# Patient Record
Sex: FEMALE | Race: WHITE | NOT HISPANIC OR LATINO | Employment: FULL TIME | URBAN - METROPOLITAN AREA
[De-identification: names, ages, dates, MRNs, and addresses within clinical notes are randomized per-mention and may not be internally consistent; named-entity substitution may affect disease eponyms.]

---

## 2018-07-26 DIAGNOSIS — F32.A DEPRESSION, UNSPECIFIED DEPRESSION TYPE: Primary | ICD-10-CM

## 2018-07-26 RX ORDER — FLUOXETINE HYDROCHLORIDE 20 MG/1
CAPSULE ORAL EVERY 24 HOURS
COMMUNITY
Start: 2018-04-26 | End: 2018-07-26 | Stop reason: SDUPTHER

## 2018-07-26 RX ORDER — FLUOXETINE HYDROCHLORIDE 20 MG/1
20 CAPSULE ORAL EVERY 24 HOURS
Qty: 90 CAPSULE | Refills: 1 | Status: SHIPPED | OUTPATIENT
Start: 2018-07-26 | End: 2019-01-28 | Stop reason: SDUPTHER

## 2019-01-28 DIAGNOSIS — F32.A DEPRESSION, UNSPECIFIED DEPRESSION TYPE: ICD-10-CM

## 2019-01-28 DIAGNOSIS — I10 ESSENTIAL HYPERTENSION: Primary | ICD-10-CM

## 2019-01-28 RX ORDER — FLUOXETINE HYDROCHLORIDE 20 MG/1
20 CAPSULE ORAL EVERY 24 HOURS
Qty: 90 CAPSULE | Refills: 0 | Status: SHIPPED | OUTPATIENT
Start: 2019-01-28 | End: 2019-04-27 | Stop reason: SDUPTHER

## 2019-01-28 RX ORDER — LISINOPRIL AND HYDROCHLOROTHIAZIDE 25; 20 MG/1; MG/1
1 TABLET ORAL DAILY
Refills: 1 | COMMUNITY
Start: 2018-10-30 | End: 2019-01-28 | Stop reason: SDUPTHER

## 2019-01-28 RX ORDER — LISINOPRIL AND HYDROCHLOROTHIAZIDE 25; 20 MG/1; MG/1
1 TABLET ORAL DAILY
Qty: 90 TABLET | Refills: 0 | Status: SHIPPED | OUTPATIENT
Start: 2019-01-28 | End: 2019-04-27 | Stop reason: SDUPTHER

## 2019-01-28 NOTE — TELEPHONE ENCOUNTER
Fax from [facility] for refill of fluoxetine HCL 20mg cap  #90  Also Lisinopril/HCTZ 20/25mg qd  #90

## 2019-02-05 ENCOUNTER — OFFICE VISIT (OUTPATIENT)
Dept: FAMILY MEDICINE CLINIC | Facility: CLINIC | Age: 39
End: 2019-02-05
Payer: COMMERCIAL

## 2019-02-05 VITALS
BODY MASS INDEX: 48.82 KG/M2 | WEIGHT: 293 LBS | RESPIRATION RATE: 16 BRPM | SYSTOLIC BLOOD PRESSURE: 110 MMHG | DIASTOLIC BLOOD PRESSURE: 70 MMHG | OXYGEN SATURATION: 97 % | HEART RATE: 83 BPM | TEMPERATURE: 97.5 F | HEIGHT: 65 IN

## 2019-02-05 DIAGNOSIS — Z23 IMMUNIZATION DUE: Primary | ICD-10-CM

## 2019-02-05 DIAGNOSIS — I10 ESSENTIAL HYPERTENSION: ICD-10-CM

## 2019-02-05 DIAGNOSIS — J01.00 ACUTE NON-RECURRENT MAXILLARY SINUSITIS: ICD-10-CM

## 2019-02-05 DIAGNOSIS — F33.41 RECURRENT MAJOR DEPRESSIVE DISORDER, IN PARTIAL REMISSION (HCC): ICD-10-CM

## 2019-02-05 PROCEDURE — 3074F SYST BP LT 130 MM HG: CPT | Performed by: NURSE PRACTITIONER

## 2019-02-05 PROCEDURE — 3008F BODY MASS INDEX DOCD: CPT | Performed by: NURSE PRACTITIONER

## 2019-02-05 PROCEDURE — 3078F DIAST BP <80 MM HG: CPT | Performed by: NURSE PRACTITIONER

## 2019-02-05 PROCEDURE — 99214 OFFICE O/P EST MOD 30 MIN: CPT | Performed by: NURSE PRACTITIONER

## 2019-02-05 RX ORDER — FLUTICASONE PROPIONATE 50 MCG
SPRAY, SUSPENSION (ML) NASAL EVERY 12 HOURS
COMMUNITY
Start: 2018-01-09

## 2019-02-05 RX ORDER — AZITHROMYCIN 250 MG/1
TABLET, FILM COATED ORAL
Qty: 6 TABLET | Refills: 0 | Status: SHIPPED | OUTPATIENT
Start: 2019-02-05 | End: 2019-02-09

## 2019-02-05 NOTE — PROGRESS NOTES
Assessment/Plan:      Diagnoses and all orders for this visit:    Immunization due  Comments: Will hold due to illness  Orders:  -     Cancel: TDAP VACCINE GREATER THAN OR EQUAL TO 6YO IM  -     Cancel: PREFERRED: influenza vaccine, [id number], quadrivalent, recombinant, PF, 0 5 mL (FLUBLOK)    Essential hypertension  Comments:  Controlled with current meds  Reviewed low-salt diet weight reduction and increase in activity level  Will see back in 6 months    Recurrent major depressive disorder, in partial remission ([facility] [address] )  Comments:  Mood has improved and desires to continue with current meds at the current dose  Will renew medication    Acute non-recurrent maxillary sinusitis  Comments: Will treat sinusitis with a Z-Oliver and Flonase  Instructed to increase fluids may use Mucinex for additional help in moving mucous  Orders:  -     azithromycin (ZITHROMAX) 250 mg tablet; Take 2 tablets today then 1 tablet daily x 4 days    Other orders  -     fluticasone (FLONASE) 50 mcg/act nasal spray; Every 12 hours          Subjective:     Patient ID: Med Heaton is a 45 y o  female  HPI    Review of Systems   Constitutional: Negative  HENT: Positive for congestion, postnasal drip, rhinorrhea and sore throat  Negative for ear pain  Respiratory: Positive for cough  Cardiovascular: Negative  Gastrointestinal: Negative  Allergic/Immunologic: Negative  Neurological: Negative  Hematological: Negative  Psychiatric/Behavioral: The patient is nervous/anxious  Objective:     Physical Exam   Constitutional: She is oriented to person, place, and time  She appears well-developed and well-nourished  HENT:   Head: Normocephalic  Right Ear: Tympanic membrane is bulging  Left Ear: Tympanic membrane is bulging  Nose: Mucosal edema present  Right sinus exhibits maxillary sinus tenderness  Left sinus exhibits maxillary sinus tenderness     Mouth/Throat: Oropharynx is clear and moist    Eyes: Conjunctivae and EOM are normal    Neck: Normal range of motion  Neck supple  Cardiovascular: Normal rate, regular rhythm and normal heart sounds  Pulmonary/Chest: Effort normal and breath sounds normal    Abdominal: Soft  Bowel sounds are normal    Musculoskeletal: Normal range of motion  Neurological: She is alert and oriented to person, place, and time  Skin: Skin is warm and dry  Psychiatric: She has a normal mood and affect   Her behavior is normal

## 2019-04-27 DIAGNOSIS — I10 ESSENTIAL HYPERTENSION: ICD-10-CM

## 2019-04-27 DIAGNOSIS — F32.A DEPRESSION, UNSPECIFIED DEPRESSION TYPE: ICD-10-CM

## 2019-04-29 RX ORDER — LISINOPRIL AND HYDROCHLOROTHIAZIDE 25; 20 MG/1; MG/1
TABLET ORAL
Qty: 90 TABLET | Refills: 0 | Status: SHIPPED | OUTPATIENT
Start: 2019-04-29 | End: 2019-07-31 | Stop reason: SDUPTHER

## 2019-04-29 RX ORDER — FLUOXETINE HYDROCHLORIDE 20 MG/1
20 CAPSULE ORAL EVERY 24 HOURS
Qty: 90 CAPSULE | Refills: 0 | Status: SHIPPED | OUTPATIENT
Start: 2019-04-29 | End: 2019-07-31 | Stop reason: SDUPTHER

## 2019-06-11 ENCOUNTER — OFFICE VISIT (OUTPATIENT)
Dept: FAMILY MEDICINE CLINIC | Facility: CLINIC | Age: 39
End: 2019-06-11
Payer: COMMERCIAL

## 2019-06-11 VITALS
DIASTOLIC BLOOD PRESSURE: 72 MMHG | HEART RATE: 79 BPM | WEIGHT: 293 LBS | TEMPERATURE: 97.9 F | SYSTOLIC BLOOD PRESSURE: 124 MMHG | HEIGHT: 65 IN | OXYGEN SATURATION: 98 % | RESPIRATION RATE: 16 BRPM | BODY MASS INDEX: 48.82 KG/M2

## 2019-06-11 DIAGNOSIS — M54.50 ACUTE LEFT-SIDED LOW BACK PAIN WITHOUT SCIATICA: ICD-10-CM

## 2019-06-11 DIAGNOSIS — M54.9 MID BACK PAIN: Primary | ICD-10-CM

## 2019-06-11 PROBLEM — J01.00 ACUTE NON-RECURRENT MAXILLARY SINUSITIS: Status: RESOLVED | Noted: 2019-02-05 | Resolved: 2019-06-11

## 2019-06-11 PROCEDURE — 99051 MED SERV EVE/WKEND/HOLIDAY: CPT | Performed by: PHYSICIAN ASSISTANT

## 2019-06-11 PROCEDURE — 3008F BODY MASS INDEX DOCD: CPT | Performed by: PHYSICIAN ASSISTANT

## 2019-06-11 PROCEDURE — 99214 OFFICE O/P EST MOD 30 MIN: CPT | Performed by: PHYSICIAN ASSISTANT

## 2019-06-11 RX ORDER — CYCLOBENZAPRINE HCL 10 MG
10 TABLET ORAL 3 TIMES DAILY PRN
Qty: 30 TABLET | Refills: 1 | Status: SHIPPED | OUTPATIENT
Start: 2019-06-11 | End: 2020-09-18

## 2019-06-27 ENCOUNTER — EVALUATION (OUTPATIENT)
Dept: PHYSICAL THERAPY | Facility: REHABILITATION | Age: 39
End: 2019-06-27
Payer: COMMERCIAL

## 2019-06-27 DIAGNOSIS — S39.012D STRAIN OF LUMBAR PARASPINOUS MUSCLE, SUBSEQUENT ENCOUNTER: Primary | ICD-10-CM

## 2019-06-27 DIAGNOSIS — M54.50 ACUTE LEFT-SIDED LOW BACK PAIN WITHOUT SCIATICA: ICD-10-CM

## 2019-06-27 DIAGNOSIS — M54.9 MID BACK PAIN: ICD-10-CM

## 2019-06-27 PROCEDURE — 97161 PT EVAL LOW COMPLEX 20 MIN: CPT

## 2019-06-27 PROCEDURE — G0283 ELEC STIM OTHER THAN WOUND: HCPCS

## 2019-06-27 PROCEDURE — 97014 ELECTRIC STIMULATION THERAPY: CPT

## 2019-07-31 DIAGNOSIS — I10 ESSENTIAL HYPERTENSION: ICD-10-CM

## 2019-07-31 DIAGNOSIS — F32.A DEPRESSION, UNSPECIFIED DEPRESSION TYPE: ICD-10-CM

## 2019-07-31 RX ORDER — LISINOPRIL AND HYDROCHLOROTHIAZIDE 25; 20 MG/1; MG/1
TABLET ORAL
Qty: 90 TABLET | Refills: 0 | Status: SHIPPED | OUTPATIENT
Start: 2019-07-31 | End: 2019-11-21 | Stop reason: SDUPTHER

## 2019-07-31 RX ORDER — FLUOXETINE HYDROCHLORIDE 20 MG/1
20 CAPSULE ORAL EVERY 24 HOURS
Qty: 90 CAPSULE | Refills: 0 | Status: SHIPPED | OUTPATIENT
Start: 2019-07-31 | End: 2019-11-05 | Stop reason: SDUPTHER

## 2019-08-09 ENCOUNTER — OFFICE VISIT (OUTPATIENT)
Dept: FAMILY MEDICINE CLINIC | Facility: CLINIC | Age: 39
End: 2019-08-09
Payer: COMMERCIAL

## 2019-08-09 VITALS
OXYGEN SATURATION: 98 % | TEMPERATURE: 97.6 F | DIASTOLIC BLOOD PRESSURE: 74 MMHG | BODY MASS INDEX: 48.82 KG/M2 | HEIGHT: 65 IN | SYSTOLIC BLOOD PRESSURE: 122 MMHG | RESPIRATION RATE: 16 BRPM | WEIGHT: 293 LBS | HEART RATE: 85 BPM

## 2019-08-09 DIAGNOSIS — Z13.220 SCREENING FOR HYPERLIPIDEMIA: ICD-10-CM

## 2019-08-09 DIAGNOSIS — K21.9 GASTROESOPHAGEAL REFLUX DISEASE WITHOUT ESOPHAGITIS: ICD-10-CM

## 2019-08-09 DIAGNOSIS — I10 ESSENTIAL HYPERTENSION: Primary | ICD-10-CM

## 2019-08-09 DIAGNOSIS — N92.0 MENORRHAGIA WITH REGULAR CYCLE: ICD-10-CM

## 2019-08-09 PROCEDURE — 99214 OFFICE O/P EST MOD 30 MIN: CPT | Performed by: NURSE PRACTITIONER

## 2019-08-09 PROCEDURE — 3008F BODY MASS INDEX DOCD: CPT | Performed by: NURSE PRACTITIONER

## 2019-08-09 PROCEDURE — 3074F SYST BP LT 130 MM HG: CPT | Performed by: NURSE PRACTITIONER

## 2019-08-09 RX ORDER — OMEPRAZOLE 40 MG/1
40 CAPSULE, DELAYED RELEASE ORAL
Qty: 90 CAPSULE | Refills: 3 | Status: SHIPPED | OUTPATIENT
Start: 2019-08-09 | End: 2020-09-18

## 2019-08-09 NOTE — ASSESSMENT & PLAN NOTE
Will controlled with current medication  Will continue with same discussed weight loss, increased exercise, and low-salt diet  Will follow up in 6 months    Labs ordered

## 2019-08-09 NOTE — PATIENT INSTRUCTIONS
Obesity   AMBULATORY CARE:   Obesity  is when your body mass index (BMI) is greater than 30  Your healthcare provider will use your height and weight to measure your BMI  The risks of obesity include  many health problems, such as injuries or physical disability  You may need tests to check for the following:  · Diabetes     · High blood pressure or high cholesterol     · Heart disease     · Gallbladder or liver disease     · Cancer of the colon, breast, prostate, liver, or kidney     · Sleep apnea     · Arthritis or gout  Seek care immediately if:   · You have a severe headache, confusion, or difficulty speaking  · You have weakness on one side of your body  · You have chest pain, sweating, or shortness of breath  Contact your healthcare provider if:   · You have symptoms of gallbladder or liver disease, such as pain in your upper abdomen  · You have knee or hip pain and discomfort while walking  · You have symptoms of diabetes, such as intense hunger and thirst, and frequent urination  · You have symptoms of sleep apnea, such as snoring or daytime sleepiness  · You have questions or concerns about your condition or care  Treatment for obesity  focuses on helping you lose weight to improve your health  Even a small decrease in BMI can reduce the risk for many health problems  Your healthcare provider will help you set a weight-loss goal   · Lifestyle changes  are the first step in treating obesity  These include making healthy food choices and getting regular physical activity  Your healthcare provider may suggest a weight-loss program that involves coaching, education, and therapy  · Medicine  may help you lose weight when it is used with a healthy diet and physical activity  · Surgery  can help you lose weight if you are very obese and have other health problems  There are several types of weight-loss surgery  Ask your healthcare provider for more information    Be successful losing weight:   · Set small, realistic goals  An example of a small goal is to walk for 20 minutes 5 days a week  Anther goal is to lose 5% of your body weight  · Tell friends, family members, and coworkers about your goals  and ask for their support  Ask a friend to lose weight with you, or join a weight-loss support group  · Identify foods or triggers that may cause you to overeat , and find ways to avoid them  Remove tempting high-calorie foods from your home and workplace  Place a bowl of fresh fruit on your kitchen counter  If stress causes you to eat, then find other ways to cope with stress  · Keep a diary to track what you eat and drink  Also write down how many minutes of physical activity you do each day  Weigh yourself once a week and record it in your diary  Eating changes: You will need to eat 500 to 1,000 fewer calories each day than you currently eat to lose 1 to 2 pounds a week  The following changes will help you cut calories:  · Eat smaller portions  Use small plates, no larger than 9 inches in diameter  Fill your plate half full of fruits and vegetables  Measure your food using measuring cups until you know what a serving size looks like  · Eat 3 meals and 1 or 2 snacks each day  Plan your meals in advance  [place] and eat at home most of the time  Eat slowly  · Eat fruits and vegetables at every meal   They are low in calories and high in fiber, which makes you feel full  Do not add butter, margarine, or cream sauce to vegetables  Use herbs to season steamed vegetables  · Eat less fat and fewer fried foods  Eat more baked or grilled chicken and fish  These protein sources are lower in calories and fat than red meat  Limit fast food  Dress your salads with olive oil and vinegar instead of bottled dressing  · Limit the amount of sugar you eat  Do not drink sugary beverages  Limit alcohol  Activity changes:  Physical activity is good for your body in many ways   It helps you burn calories and build strong muscles  It decreases stress and depression, and improves your mood  It can also help you sleep better  Talk to your healthcare provider before you begin an exercise program   · Exercise for at least 30 minutes 5 days a week  Start slowly  Set aside time each day for physical activity that you enjoy and that is convenient for you  It is best to do both weight training and an activity that increases your heart rate, such as walking, bicycling, or swimming  · Find ways to be more active  Do yard work and housecleaning  Walk up the stairs instead of using elevators  Spend your leisure time going to events that require walking, such as outdoor festivals or fairs  This extra physical activity can help you lose weight and keep it off  Follow up with your healthcare provider as directed: You may need to meet with a dietitian  Write down your questions so you remember to ask them during your visits  © 2017 [employer] Information is for End User's use only and may not be sold, redistributed or otherwise used for commercial purposes  All illustrations and images included in CareNotes® are the copyrighted property of [employer]  or [employer]  The above information is an  only  It is not intended as medical advice for individual conditions or treatments  Talk to your doctor, nurse or pharmacist before following any medical regimen to see if it is safe and effective for you  Low Fat Diet   AMBULATORY CARE:   A low-fat diet  is an eating plan that is low in total fat, unhealthy fat, and cholesterol  You may need to follow a low-fat diet if you have trouble digesting or absorbing fat  You may also need to follow this diet if you have high cholesterol  You can also lower your cholesterol by increasing the amount of fiber in your diet  Soluble fiber is a type of fiber that helps to decrease cholesterol levels     Different types of fat in food:   · Limit unhealthy fats  A diet that is high in cholesterol, saturated fat, and trans fat may cause unhealthy cholesterol levels  Unhealthy cholesterol levels increase your risk of heart disease  ¨ Cholesterol:  Limit intake of cholesterol to less than 200 mg per day  Cholesterol is found in meat, eggs, and dairy  ¨ Saturated fat:  Limit saturated fat to less than 7% of your total daily calories  Ask your dietitian how many calories you need each day  Saturated fat is found in butter, cheese, ice cream, whole milk, and palm oil  Saturated fat is also found in meat, such as beef, pork, chicken skin, and processed meats  Processed meats include sausage, hot dogs, and bologna  ¨ Trans fat:  Avoid trans fat as much as possible  Trans fat is used in fried and baked foods  Foods that say trans fat free on the label may still have up to 0 5 grams of trans fat per serving  · Include healthy fats  Replace foods that are high in saturated and trans fat with foods high in healthy fats  This may help to decrease high cholesterol levels  ¨ Monounsaturated fats: These are found in avocados, nuts, and vegetable oils, such as olive, canola, and sunflower oil  ¨ Polyunsaturated fats: These can be found in vegetable oils, such as soybean or corn oil  Omega-3 fats can help to decrease the risk of heart disease  Omega-3 fats are found in fish, such as salmon, herring, trout, and tuna  Omega-3 fats can also be found in plant foods, such as walnuts, flaxseed, soybeans, and canola oil    Foods to limit or avoid:   · Grains:      ¨ Snacks that are made with partially hydrogenated oils, such as chips, regular crackers, and butter-flavored popcorn    ¨ High-fat baked goods, such as biscuits, croissants, doughnuts, pies, cookies, and pastries    · Dairy:      ¨ Whole milk, 2% milk, and yogurt and ice cream made with whole milk    ¨ Half and half creamer, heavy cream, and whipping cream    ¨ Cheese, cream cheese, and sour cream    · Meats and proteins:      ¨ High-fat cuts of meat (T-bone steak, regular hamburger, and ribs)    ¨ Fried meat, poultry (turkey and chicken), and fish    ¨ Poultry (chicken and turkey) with skin    ¨ Cold cuts (salami or bologna), hot dogs, razo, and sausage    ¨ Whole eggs and egg yolks    · Vegetables and fruits with added fat:      ¨ Fried vegetables or vegetables in butter or high-fat sauces, such as cream or cheese sauces    ¨ Fried fruit or fruit served with butter or cream    · Fats:      ¨ Butter, stick margarine, and shortening    ¨ Coconut, palm oil, and palm kernel oil  Foods to include:   · Grains:      ¨ Whole-grain breads, cereals, pasta, and brown rice    ¨ Low-fat crackers and pretzels    · Vegetables and fruits:      ¨ Fresh, frozen, or canned vegetables (no salt or low-sodium)    ¨ Fresh, frozen, dried, or canned fruit (canned in light syrup or fruit juice)    ¨ Avocado    · Low-fat dairy products:      ¨ Nonfat (skim) or 1% milk    ¨ Nonfat or low-fat cheese, yogurt, and cottage cheese    · Meats and proteins:      ¨ Chicken or turkey with no skin    ¨ Baked or broiled fish    ¨ Lean beef and pork (loin, round, extra lean hamburger)    ¨ Beans and peas, unsalted nuts, soy products    ¨ Egg whites and substitutes    ¨ Seeds and nuts    · Fats:      ¨ Unsaturated oil, such as canola, olive, peanut, soybean, or sunflower oil    ¨ Soft or liquid margarine and vegetable oil spread    ¨ Low-fat salad dressing  Other ways to decrease fat:   · Read food labels before you buy foods  Choose foods that have less than 30% of calories from fat  Choose low-fat or fat-free dairy products  Remember that fat free does not mean calorie free  These foods still contain calories, and too many calories can lead to weight gain  · Trim fat from meat and avoid fried food  Trim all visible fat from meat before you cook it  Remove the skin from poultry  Do not roper meat, fish, or poultry  Bake, roast, boil, or broil these foods instead  Avoid fried foods  Eat a baked potato instead of [place] fries  Steam vegetables instead of sautéing them in butter  · Add less fat to foods  Use imitation razo bits on salads and baked potatoes instead of regular razo bits  Use fat-free or low-fat salad dressings instead of regular dressings  Use low-fat or nonfat butter-flavored topping instead of regular butter or margarine on popcorn and other foods  Ways to decrease fat in recipes:  Replace high-fat ingredients with low-fat or nonfat ones  This may cause baked goods to be drier than usual  You may need to use nonfat cooking spray on pans to prevent food from sticking  You also may need to change the amount of other ingredients, such as water, in the recipe  Try the following:  · Use low-fat or light margarine instead of regular margarine or shortening  · Use lean ground turkey breast or chicken, or lean ground beef (less than 5% fat) instead of hamburger  · Add 1 teaspoon of canola oil to 8 ounces of skim milk instead of using cream or half and half  · Use grated zucchini, carrots, or apples in breads instead of coconut  · Use blenderized, low-fat cottage cheese, plain tofu, or low-fat ricotta cheese instead of cream cheese  · Use 1 egg white and 1 teaspoon of canola oil, or use ¼ cup (2 ounces) of fat-free egg substitute instead of a whole egg  · Replace half of the oil that is called for in a recipe with applesauce when you bake  Use 3 tablespoons of cocoa powder and 1 tablespoon of canola oil instead of a square of baking chocolate  How to increase fiber:  Eat enough high-fiber foods to get 20 to 30 grams of fiber every day  Slowly increase your fiber intake to avoid stomach cramps, gas, and other problems  · Eat 3 ounces of whole-grain foods each day  An ounce is about 1 slice of bread  Eat whole-grain breads, such as whole-wheat bread   Whole wheat, whole-wheat flour, or other whole grains should be listed as the first ingredient on the food label  Replace white flour with whole-grain flour or use half of each in recipes  Whole-grain flour is heavier than white flour, so you may have to add more yeast or baking powder  · Eat a high-fiber cereal for breakfast   Oatmeal is a good source of soluble fiber  Look for cereals that have bran or fiber in the name  Choose whole-grain products, such as brown rice, barley, and whole-wheat pasta  · Eat more beans, peas, and lentils  For example, add beans to soups or salads  Eat at least 5 cups of fruits and vegetables each day  Eat fruits and vegetables with the peel because the peel is high in fiber  © 2017 [employer] Information is for End User's use only and may not be sold, redistributed or otherwise used for commercial purposes  All illustrations and images included in CareNotes® are the copyrighted property of A D [employer] M [employer]  or [employer]inder [employer]  The above information is an  only  It is not intended as medical advice for individual conditions or treatments  Talk to your doctor, nurse or pharmacist before following any medical regimen to see if it is safe and effective for you  Heart Healthy Diet   AMBULATORY CARE:   A heart healthy diet  is an eating plan low in total fat, unhealthy fats, and sodium (salt)  A heart healthy diet helps decrease your risk for heart disease and stroke  Limit the amount of fat you eat to 25% to 35% of your total daily calories  Limit sodium to less than 2,300 mg each day  Healthy fats:  Healthy fats can help improve cholesterol levels  The risk for heart disease is decreased when cholesterol levels are normal  Choose healthy fats, such as the following:  · Unsaturated fat  is found in foods such as soybean, canola, olive, corn, and safflower oils  It is also found in soft tub margarine that is made with liquid vegetable oil       · Omega-3 fat  is found in certain fish, such as salmon, tuna, and trout, and in walnuts and flaxseed  Unhealthy fats:  Unhealthy fats can cause unhealthy cholesterol levels in your blood and increase your risk of heart disease  Limit unhealthy fats, such as the following:  · Cholesterol  is found in animal foods, such as eggs and lobster, and in dairy products made from whole milk  Limit cholesterol to less than 300 milligrams (mg) each day  You may need to limit cholesterol to 200 mg each day if you have heart disease  · Saturated fat  is found in meats, such as razo and hamburger  It is also found in chicken or turkey skin, whole milk, and butter  Limit saturated fat to less than 7% of your total daily calories  Limit saturated fat to less than 6% if you have heart disease or are at increased risk for it  · Trans fat  is found in packaged foods, such as potato chips and cookies  It is also in hard margarine, some fried foods, and shortening  Avoid trans fats as much as possible    Heart healthy foods and drinks to include:  Ask your dietitian or healthcare provider how many servings to have from each of the following food groups:  · Grains:      ¨ Whole-wheat breads, cereals, and pastas, and brown rice    ¨ Low-fat, low-sodium crackers and chips    · Vegetables:      ¨ Broccoli, green beans, green peas, and spinach    ¨ Collards, kale, and lima beans    ¨ Carrots, sweet potatoes, tomatoes, and peppers    ¨ Canned vegetables with no salt added    · Fruits:      ¨ Bananas, peaches, pears, and pineapple    ¨ Grapes, raisins, and dates    ¨ Oranges, tangerines, grapefruit, orange juice, and grapefruit juice    ¨ Apricots, mangoes, melons, and papaya    ¨ Raspberries and strawberries    ¨ Canned fruit with no added sugar    · Low-fat dairy products:      ¨ Nonfat (skim) milk, 1% milk, and low-fat almond, cashew, or soy milks fortified with calcium    ¨ Low-fat cheese, regular or frozen yogurt, and cottage cheese    · Meats and proteins , such as lean cuts of beef and pork (loin, leg, round), skinless chicken and turkey, legumes, soy products, egg whites, and nuts  Foods and drinks to limit or avoid:  Ask your dietitian or healthcare provider about these and other foods that are high in unhealthy fat, sodium, and sugar:  · Snack or packaged foods , such as frozen dinners, cookies, macaroni and cheese, and cereals with more than 300 mg of sodium per serving    · Canned or dry mixes  for cakes, soups, sauces, or gravies    · Vegetables with added sodium , such as instant potatoes, vegetables with added sauces, or regular canned vegetables    · Other foods high in sodium , such as ketchup, barbecue sauce, salad dressing, pickles, olives, soy sauce, and miso    · High-fat dairy foods  such as whole or 2% milk, cream cheese, or sour cream, and cheeses     · High-fat protein foods  such as high-fat cuts of beef (T-bone steaks, ribs), chicken or turkey with skin, and organ meats, such as liver    · Cured or smoked meats , such as hot dogs, razo, and sausage    · Unhealthy fats and oils , such as butter, stick margarine, shortening, and cooking oils such as coconut or palm oil    · Food and drinks high in sugar , such as soft drinks (soda), sports drinks, sweetened tea, candy, cake, cookies, pies, and doughnuts  Other diet guidelines to follow:   · Eat more foods containing omega-3 fats  Eat fish high in omega-3 fats at least 2 times a week  · Limit alcohol  Too much alcohol can damage your heart and raise your blood pressure  Women should limit alcohol to 1 drink a day  Men should limit alcohol to 2 drinks a day  A drink of alcohol is 12 ounces of beer, 5 ounces of wine, or 1½ ounces of liquor  · Choose low-sodium foods  High-sodium foods can lead to high blood pressure  Add little or no salt to food you prepare  Use herbs and spices in place of salt  · Eat more fiber  to help lower cholesterol levels   Eat at least 5 servings of fruits and vegetables each day  Eat 3 ounces of whole-grain foods each day  Legumes (beans) are also a good source of fiber  Lifestyle guidelines:   · Do not smoke  Nicotine and other chemicals in cigarettes and cigars can cause lung and heart damage  Ask your healthcare provider for information if you currently smoke and need help to quit  E-cigarettes or smokeless tobacco still contain nicotine  Talk to your healthcare provider before you use these products  · Exercise regularly  to help you maintain a healthy weight and improve your blood pressure and cholesterol levels  Ask your healthcare provider about the best exercise plan for you  Do not start an exercise program without asking your healthcare provider  Follow up with your healthcare provider as directed:  Write down your questions so you remember to ask them during your visits  © 2017 [address] [facility] Information is for End User's use only and may not be sold, redistributed or otherwise used for commercial purposes  All illustrations and images included in CareNotes® are the copyrighted property of A D A M [employer]  or Gurvinder Osorio  The above information is an  only  It is not intended as medical advice for individual conditions or treatments  Talk to your doctor, nurse or pharmacist before following any medical regimen to see if it is safe and effective for you  Calorie Counting Diet   WHAT YOU NEED TO KNOW:   What is a calorie counting diet? It is a meal plan based on counting calories each day to reach a healthy body weight  You will need to eat fewer calories if you are trying to lose weight  Weight loss may decrease your risk for certain health problems or improve your health if you have health problems  Some of these health problems include heart disease, high blood pressure, and diabetes  What foods should I avoid?   Your dietitian will tell you if you need to avoid certain foods based on your body weight and health condition  You may need to avoid high-fat foods if you are at risk for or have heart disease  You may need to eat fewer foods from the breads and starches food group if you have diabetes  How many calories are in foods? The following is a list of foods and drinks with the approximate number of calories in each  Check the food label to find the exact number of calories  A dietitian can tell you how many calories you should have from each food group each day    · Carbohydrate:      ¨ ½ of a 3-inch bagel, 1 slice of bread, or ½ of a hamburger bun or hot dog bun (80)    ¨ 1 (8-inch) flour tortilla or ½ cup of cooked rice (100)    ¨ 1 (6-inch) corn tortilla (80)    ¨ 1 (6-inch) pancake or 1 cup of bran flakes cereal (110)    ¨ ½ cup of cooked cereal (80)    ¨ ½ cup of cooked pasta (85)    ¨ 1 ounce of pretzels (100)    ¨ 3 cups of air-popped popcorn without butter or oil (80)    · Dairy:      ¨ 1 cup of skim or 1% milk (90)    ¨ 1 cup of 2% milk (120)    ¨ 1 cup of whole milk (160)    ¨ 1 cup of 2% chocolate milk (220)    ¨ 1 ounce of low-fat cheese with 3 grams of fat per ounce (70)    ¨ 1 ounce of cheddar cheese (114)    ¨ ½ cup of 1% fat cottage cheese (80)    ¨ 1 cup of plain or sugar-free, fat-free yogurt (90)    · Protein foods:      ¨ 3 ounces of fish (not breaded or fried) (95)    ¨ 3 ounces of breaded, fried fish (195)    ¨ ¾ cup of tuna canned in water (105)    ¨ 3 ounces of chicken breast without skin (105)    ¨ 1 fried chicken breast with skin (350)    ¨ ¼ cup of fat free egg substitute (40)    ¨ 1 large egg (75)    ¨ 3 ounces of lean beef or pork (165)    ¨ 3 ounces of fried pork chop or ham (185)    ¨ ½ cup of cooked dried beans, such as kidney, nieto, lentils, or navy (115)    ¨ 3 ounces of bologna or lunch meat (225)    ¨ 2 links of breakfast sausage (140)    · Vegetables:      ¨ ½ cup of sliced mushrooms (10)    ¨ 1 cup of salad greens, such as lettuce, spinach, or noah (15)    ¨ ½ cup of steamed asparagus (20)    ¨ ½ cup of cooked summer squash, zucchini squash, or green or wax beans (25)    ¨ 1 cup of broccoli or cauliflower florets, or 1 medium tomato (25)    ¨ 1 large raw carrot or ½ cup of cooked carrots (40)    ¨ ? of a medium cucumber or 1 stalk of celery (5)    ¨ 1 small baked potato (160)    ¨ 1 cup of breaded, fried vegetables (230)    · Fruit:      ¨ 1 (6-inch) banana (55)     ¨ ½ of a 4-inch grapefruit (55)    ¨ 15 grapes (60)    ¨ 1 medium orange or apple (70)    ¨ 1 large peach (65)    ¨ 1 cup of fresh pineapple chunks (75)    ¨ 1 cup of melon cubes (50)    ¨ 1¼ cups of whole strawberries (45)    ¨ ½ cup of fruit canned in juice (55)    ¨ ½ cup of fruit canned in heavy syrup (110)    ¨ ?  cup of raisins (130)    ¨ ½ cup of unsweetened fruit juice (60)    ¨ ½ cup of grape, cranberry, or prune juice (90)    · Fat:      ¨ 10 peanuts or 2 teaspoons of peanut butter (55)    ¨ 2 tablespoons of avocado or 1 tablespoon of regular salad dressing (45)    ¨ 2 slices of razo (90)    ¨ 1 teaspoon of oil, such as safflower, canola, corn, or olive oil (45)    ¨ 2 teaspoons of low-fat margarine, or 1 tablespoon of low-fat mayonnaise (50)    ¨ 1 teaspoon of regular margarine (40)    ¨ 1 tablespoon of regular mayonnaise (135)    ¨ 1 tablespoon of cream cheese or 2 tablespoons of low-fat cream cheese (45)    ¨ 2 tablespoons of vegetable shortening (215)    · Dessert and sweets:      ¨ 8 animal crackers or 5 vanilla wafers (80)    ¨ 1 frozen fruit juice bar (80)    ¨ ½ cup of ice milk or low-fat frozen yogurt (90)    ¨ ½ cup of sherbet or sorbet (125)    ¨ ½ cup of sugar-free pudding or custard (60)    ¨ ½ cup of ice cream (140)    ¨ ½ cup of pudding or custard (175)    ¨ 1 (2-inch) square chocolate brownie (185)    · Combination foods:      ¨ Bean burrito made with an 8-inch tortilla, without cheese (275)    ¨ Chicken breast sandwich with lettuce and tomato (325)    ¨ 1 cup of chicken noodle soup (60)    ¨ 1 beef taco (175)    ¨ Regular hamburger with lettuce and tomato (310)    ¨ Regular cheeseburger with lettuce and tomato (410)     ¨ ¼ of a 12-inch cheese pizza (280)    ¨ Fried fish sandwich with lettuce and tomato (425)    ¨ Hot dog and bun (275)    ¨ 1½ cups of macaroni and cheese (310)    ¨ Taco salad with a fried tortilla shell (870)    · Low-calorie foods:      ¨ 1 tablespoon of ketchup or 1 tablespoon of fat free sour cream (15)    ¨ 1 teaspoon of mustard (5)    ¨ ¼ cup of salsa (20)    ¨ 1 large dill pickle (15)    ¨ 1 tablespoon of fat free salad dressing (10)    ¨ 2 teaspoons of low-sugar, light jam or jelly, or 1 tablespoon of sugar-free syrup (15)    ¨ 1 sugar-free popsicle (15)    ¨ 1 cup of club soda, seltzer water, or diet soda (0)  CARE AGREEMENT:   You have the right to help plan your care  Discuss treatment options with your caregivers to decide what care you want to receive  You always have the right to refuse treatment  The above information is an  only  It is not intended as medical advice for individual conditions or treatments  Talk to your doctor, nurse or pharmacist before following any medical regimen to see if it is safe and effective for you  © 2017 [employer] Information is for End User's use only and may not be sold, redistributed or otherwise used for commercial purposes  All illustrations and images included in CareNotes® are the copyrighted property of [employer] M [employer]  or [employer]

## 2019-08-09 NOTE — ASSESSMENT & PLAN NOTE
Blood pressure is well controlled with current medication  Discussed diet, exercise, and continuing with low-salt diet    She is to follow up in 6 months

## 2019-08-09 NOTE — PROGRESS NOTES
Assessment/Plan:     Gastroesophageal reflux disease without esophagitis  No abdominal pain on exam   Will use omeprazole 40 mg p r n  But may increase to daily if not controlled  Menorrhagia with regular cycle  Will refer to gyn for eval and treat  Screening for hyperlipidemia  Will do screening lipid panel and treat as needed  Essential hypertension  Blood pressure is well controlled with current medication  Discussed diet, exercise, and continuing with low-salt diet  She is to follow up in 6 months       Diagnoses and all orders for this visit:    Essential hypertension  -     Comprehensive metabolic panel; Future    Gastroesophageal reflux disease without esophagitis  -     omeprazole (PriLOSEC) 40 MG capsule; Take 1 capsule (40 mg total) by mouth daily before breakfast    Screening for hyperlipidemia  -     Lipid Panel with Direct LDL reflex; Future    Menorrhagia with regular cycle  -     CBC and differential; Future  -     Ambulatory referral to Obstetrics / Gynecology; Future          Subjective:     Patient ID: Vero Rao is a 44 y o  female  HPI  Presents today for 6 month f/u  Her depression is good, having some bad days but generally good  BP is controlled  The abd decubutis is healed  Lately she has been having heart burn  It is everyday and not related to food  Review of Systems   Constitutional: Negative  Respiratory: Negative  Cardiovascular: Negative  Gastrointestinal:        Heart burn in the chest and goes up the throat  Genitourinary: Positive for menstrual problem (periods are very heavy  She uses b/w 12-15 pads a day  )  Musculoskeletal: Positive for back pain (is causes with heavy lifting )  Allergic/Immunologic: Positive for environmental allergies  Neurological: Negative  Hematological: Negative  Psychiatric/Behavioral: Positive for decreased concentration  The patient is nervous/anxious            Objective:     Physical Exam Constitutional: She is oriented to person, place, and time  She appears well-developed and well-nourished  HENT:   Head: Normocephalic and atraumatic  Right Ear: External ear normal    Left Ear: External ear normal    Nose: Nose normal    Mouth/Throat: Oropharynx is clear and moist    Eyes: Conjunctivae and EOM are normal    Neck: Normal range of motion  Neck supple  Cardiovascular: Normal rate, regular rhythm, normal heart sounds and intact distal pulses  Pulmonary/Chest: Effort normal and breath sounds normal    Abdominal: Soft  Bowel sounds are normal    Musculoskeletal: Normal range of motion  Neurological: She is alert and oriented to person, place, and time  She has normal reflexes  Skin: Skin is warm and dry  Psychiatric: She has a normal mood and affect  Her behavior is normal  Judgment and thought content normal      BMI Counseling: Body mass index is 57 24 kg/m²  Discussed the patient's BMI with her  The BMI is above average  BMI counseling and education was provided to the patient  Nutrition recommendations include reducing portion sizes, decreasing overall calorie intake, 3-5 servings of fruits/vegetables daily, reducing fast food intake, consuming healthier snacks, decreasing soda and/or juice intake, moderation in carbohydrate intake, increasing intake of lean protein, reducing intake of saturated fat and trans fat and reducing intake of cholesterol  Exercise recommendations include moderate aerobic physical activity for 150 minutes/week, vigorous aerobic physical activity for 75 minutes/week and exercising 3-5 times per week  Pharmacotherapy was recommended as ordered

## 2019-08-09 NOTE — ASSESSMENT & PLAN NOTE
No abdominal pain on exam   Will use omeprazole 40 mg p r n  But may increase to daily if not controlled

## 2019-11-05 DIAGNOSIS — F32.A DEPRESSION, UNSPECIFIED DEPRESSION TYPE: ICD-10-CM

## 2019-11-06 RX ORDER — FLUOXETINE HYDROCHLORIDE 20 MG/1
20 CAPSULE ORAL EVERY 24 HOURS
Qty: 90 CAPSULE | Refills: 0 | Status: SHIPPED | OUTPATIENT
Start: 2019-11-06 | End: 2020-02-03 | Stop reason: SDUPTHER

## 2019-11-09 DIAGNOSIS — I10 ESSENTIAL HYPERTENSION: ICD-10-CM

## 2019-11-09 RX ORDER — LISINOPRIL AND HYDROCHLOROTHIAZIDE 25; 20 MG/1; MG/1
1 TABLET ORAL DAILY
Qty: 90 TABLET | Refills: 0 | Status: CANCELLED | OUTPATIENT
Start: 2019-11-09

## 2019-11-21 DIAGNOSIS — I10 ESSENTIAL HYPERTENSION: ICD-10-CM

## 2019-11-21 NOTE — TELEPHONE ENCOUNTER
Pt left message stating that this is the third request for refill of lisinopril/HCTZ 20/25mg    [facility] at [phone]    Pt was upset

## 2019-11-25 RX ORDER — LISINOPRIL AND HYDROCHLOROTHIAZIDE 25; 20 MG/1; MG/1
1 TABLET ORAL DAILY
Qty: 90 TABLET | Refills: 0 | Status: SHIPPED | OUTPATIENT
Start: 2019-11-25 | End: 2020-02-25 | Stop reason: SDUPTHER

## 2020-01-30 DIAGNOSIS — F32.A DEPRESSION, UNSPECIFIED DEPRESSION TYPE: ICD-10-CM

## 2020-02-03 DIAGNOSIS — F32.A DEPRESSION, UNSPECIFIED DEPRESSION TYPE: ICD-10-CM

## 2020-02-03 NOTE — TELEPHONE ENCOUNTER
Pt is scheduled for 2/10/20 with Dr Caro Valdovinos  She was scheduled to come in sooner but appointment needed to be rescheduled by our office   Sent to Dr Caro Valdovinos for approval

## 2020-02-04 RX ORDER — FLUOXETINE HYDROCHLORIDE 20 MG/1
20 CAPSULE ORAL EVERY 24 HOURS
Qty: 90 CAPSULE | Refills: 0 | Status: SHIPPED | OUTPATIENT
Start: 2020-02-04 | End: 2020-02-10 | Stop reason: SDUPTHER

## 2020-02-10 ENCOUNTER — OFFICE VISIT (OUTPATIENT)
Dept: FAMILY MEDICINE CLINIC | Facility: CLINIC | Age: 40
End: 2020-02-10
Payer: COMMERCIAL

## 2020-02-10 VITALS
RESPIRATION RATE: 16 BRPM | SYSTOLIC BLOOD PRESSURE: 112 MMHG | HEART RATE: 76 BPM | OXYGEN SATURATION: 98 % | BODY MASS INDEX: 48.82 KG/M2 | TEMPERATURE: 97.8 F | DIASTOLIC BLOOD PRESSURE: 72 MMHG | WEIGHT: 293 LBS | HEIGHT: 65 IN

## 2020-02-10 DIAGNOSIS — E66.01 MORBID OBESITY WITH BMI OF 40.0-44.9, ADULT (HCC): ICD-10-CM

## 2020-02-10 DIAGNOSIS — E78.49 OTHER HYPERLIPIDEMIA: ICD-10-CM

## 2020-02-10 DIAGNOSIS — I10 ESSENTIAL HYPERTENSION: ICD-10-CM

## 2020-02-10 DIAGNOSIS — F41.9 ANXIETY: ICD-10-CM

## 2020-02-10 DIAGNOSIS — E55.9 VITAMIN D INSUFFICIENCY: ICD-10-CM

## 2020-02-10 DIAGNOSIS — M25.531 RIGHT WRIST PAIN: ICD-10-CM

## 2020-02-10 DIAGNOSIS — F32.A DEPRESSION, UNSPECIFIED DEPRESSION TYPE: Primary | ICD-10-CM

## 2020-02-10 PROCEDURE — 1036F TOBACCO NON-USER: CPT | Performed by: FAMILY MEDICINE

## 2020-02-10 PROCEDURE — 3078F DIAST BP <80 MM HG: CPT | Performed by: FAMILY MEDICINE

## 2020-02-10 PROCEDURE — 3074F SYST BP LT 130 MM HG: CPT | Performed by: FAMILY MEDICINE

## 2020-02-10 PROCEDURE — 99214 OFFICE O/P EST MOD 30 MIN: CPT | Performed by: FAMILY MEDICINE

## 2020-02-10 PROCEDURE — 3008F BODY MASS INDEX DOCD: CPT | Performed by: FAMILY MEDICINE

## 2020-02-10 RX ORDER — LORAZEPAM 0.5 MG/1
0.5 TABLET ORAL EVERY 8 HOURS PRN
Qty: 30 TABLET | Refills: 0 | Status: SHIPPED | OUTPATIENT
Start: 2020-02-10 | End: 2020-09-18

## 2020-02-10 RX ORDER — FLUOXETINE HYDROCHLORIDE 40 MG/1
40 CAPSULE ORAL EVERY 24 HOURS
Qty: 30 CAPSULE | Refills: 3 | Status: SHIPPED | OUTPATIENT
Start: 2020-02-10 | End: 2020-05-04

## 2020-02-10 RX ORDER — NAPROXEN 500 MG/1
500 TABLET ORAL 2 TIMES DAILY WITH MEALS
Qty: 30 TABLET | Refills: 1 | Status: SHIPPED | OUTPATIENT
Start: 2020-02-10 | End: 2020-09-18

## 2020-02-10 NOTE — PATIENT INSTRUCTIONS
Obesity   AMBULATORY CARE:   Obesity  is when your body mass index (BMI) is greater than 30  Your healthcare provider will use your height and weight to measure your BMI  The risks of obesity include  many health problems, such as injuries or physical disability  You may need tests to check for the following:  · Diabetes     · High blood pressure or high cholesterol     · Heart disease     · Gallbladder or liver disease     · Cancer of the colon, breast, prostate, liver, or kidney     · Sleep apnea     · Arthritis or gout  Seek care immediately if:   · You have a severe headache, confusion, or difficulty speaking  · You have weakness on one side of your body  · You have chest pain, sweating, or shortness of breath  Contact your healthcare provider if:   · You have symptoms of gallbladder or liver disease, such as pain in your upper abdomen  · You have knee or hip pain and discomfort while walking  · You have symptoms of diabetes, such as intense hunger and thirst, and frequent urination  · You have symptoms of sleep apnea, such as snoring or daytime sleepiness  · You have questions or concerns about your condition or care  Treatment for obesity  focuses on helping you lose weight to improve your health  Even a small decrease in BMI can reduce the risk for many health problems  Your healthcare provider will help you set a weight-loss goal   · Lifestyle changes  are the first step in treating obesity  These include making healthy food choices and getting regular physical activity  Your healthcare provider may suggest a weight-loss program that involves coaching, education, and therapy  · Medicine  may help you lose weight when it is used with a healthy diet and physical activity  · Surgery  can help you lose weight if you are very obese and have other health problems  There are several types of weight-loss surgery  Ask your healthcare provider for more information    Be successful losing weight:   · Set small, realistic goals  An example of a small goal is to walk for 20 minutes 5 days a week  Anther goal is to lose 5% of your body weight  · Tell friends, family members, and coworkers about your goals  and ask for their support  Ask a friend to lose weight with you, or join a weight-loss support group  · Identify foods or triggers that may cause you to overeat , and find ways to avoid them  Remove tempting high-calorie foods from your home and workplace  Place a bowl of fresh fruit on your kitchen counter  If stress causes you to eat, then find other ways to cope with stress  · Keep a diary to track what you eat and drink  Also write down how many minutes of physical activity you do each day  Weigh yourself once a week and record it in your diary  Eating changes: You will need to eat 500 to 1,000 fewer calories each day than you currently eat to lose 1 to 2 pounds a week  The following changes will help you cut calories:  · Eat smaller portions  Use small plates, no larger than 9 inches in diameter  Fill your plate half full of fruits and vegetables  Measure your food using measuring cups until you know what a serving size looks like  · Eat 3 meals and 1 or 2 snacks each day  Plan your meals in advance  Lavetta Fuss and eat at home most of the time  Eat slowly  · Eat fruits and vegetables at every meal   They are low in calories and high in fiber, which makes you feel full  Do not add butter, margarine, or cream sauce to vegetables  Use herbs to season steamed vegetables  · Eat less fat and fewer fried foods  Eat more baked or grilled chicken and fish  These protein sources are lower in calories and fat than red meat  Limit fast food  Dress your salads with olive oil and vinegar instead of bottled dressing  · Limit the amount of sugar you eat  Do not drink sugary beverages  Limit alcohol  Activity changes:  Physical activity is good for your body in many ways   It helps you burn calories and build strong muscles  It decreases stress and depression, and improves your mood  It can also help you sleep better  Talk to your healthcare provider before you begin an exercise program   · Exercise for at least 30 minutes 5 days a week  Start slowly  Set aside time each day for physical activity that you enjoy and that is convenient for you  It is best to do both weight training and an activity that increases your heart rate, such as walking, bicycling, or swimming  · Find ways to be more active  Do yard work and housecleaning  Walk up the stairs instead of using elevators  Spend your leisure time going to events that require walking, such as outdoor festivals or fairs  This extra physical activity can help you lose weight and keep it off  Follow up with your healthcare provider as directed: You may need to meet with a dietitian  Write down your questions so you remember to ask them during your visits  © 2017 [employer] Information is for End User's use only and may not be sold, redistributed or otherwise used for commercial purposes  All illustrations and images included in CareNotes® are the copyrighted property of [employer]  or [employer]  The above information is an  only  It is not intended as medical advice for individual conditions or treatments  Talk to your doctor, nurse or pharmacist before following any medical regimen to see if it is safe and effective for you

## 2020-02-10 NOTE — PROGRESS NOTES
Assessment/Plan:    1  Right wrist pain   -Refer to PT and Ortho  She was given naproxen  2  Recurrent MDD, in partial remission   - increase Prozac 40 mg daily  3  Panic attacks   - Increasing fluoxetine (Prozac) to 40 mg daily  She was given prescription for Ativan 0 5 mg daily as needed for panic attack  It was discussed about possible side effects  4  GERD   - Patient currently well-controlled    5  Asthma   - Patient currently well-controlled  Fely Payment   Age: 44  Data as of: 02/10/2020    No prescription data is available from your state [facility] for this patient  Demographics    Linked Records         Search Criteria            Summary    Summary  Total Prescriptions:    0    Total Prescribers:    0    Total Pharmacies:    0    Narcotics* (excluding Buprenorphine)  Current Qty:   0   Current MME/day:   0 00   30 Day Avg MME/day:   0 00   Buprenorphine*  Current Qty:   0   Current mg/day:   0 00   30 Day Avg mg/day:   0 00   Prescriptions    *Highlighted drugs could not be included in score calculations   Prescriptions  Total Prescriptions: 0    Total Private Pay: 0    Fill Date ID   Written Drug Qty Days Prescriber Rx # Pharmacy Refill   Daily Dose* Pymt Type      *Per CDC guidance, the MME conversion factors prescribed or provided as part of the medication-assisted treatment for opioid use disorder should not be used to benchmark against dosage thresholds meant for opioids prescribed for pain  Buprenorphine products have no agreed upon morphine equivalency, and as partial opioid agonists, are not expected to be associated with overdose risk in the same dose-dependent manner as doses for full agonist opioids  MME = morphine milligram equivalents  LME = Lorazepam milligram equivalents  MG = dose in milligrams     Providers  Total Providers: 0   [email or web address] Phone   Pharmacies  Total Pharmacies: 0   [email or web address]       Subjective: See below Patient ID: Shellie Mendieta is a 44 y o  female  Patient presents with morning numbness in both hands and pain in right wrist x3 months  Patient started working cutting fabric 5 months ago and believes that these issues may be related  Patient tries stretching in the morning to make the symptoms better  Nothing in particular makes it worse  Pain in right wrist is 4/10 and non-radiating and feels like a deep, dull ache  Patient also with depression and wakes up feeling panicked  This feeling of panic is similar to previous panic attacks but not as severe  Symptoms include tachypnea and tachycardia and sense of doom  Patient also admits to [id number] and concentration, lack of energy, and agitation, but denies changes in appetite or thoughts of suicide  Patient denies any concerns regarding her GERD  Hypertension   Pertinent negatives include no chest pain, headaches, palpitations or shortness of breath  Depression   Pertinent negatives include no chest pain, chills, coughing, fever, headaches, joint swelling, nausea or vomiting  Review of Systems   Constitutional: Negative for chills and fever  HENT: Negative for hearing loss  Eyes: Negative for visual disturbance  Respiratory: Negative for cough and shortness of breath  Cardiovascular: Negative for chest pain and palpitations  Gastrointestinal: Negative for nausea and vomiting  Endocrine: Negative for cold intolerance and heat intolerance  Genitourinary: Negative for difficulty urinating and dysuria  Musculoskeletal: Negative for joint swelling and neck stiffness  Neurological: Negative for dizziness and headaches  Hematological: Does not bruise/bleed easily  Psychiatric/Behavioral: Positive for depression and dysphoric mood (See HPI)  Negative for self-injury         Objective:    /72 (BP Location: Left arm, Patient Position: Sitting, Cuff Size: Large)   Pulse 76   Temp 97 8 °F (36 6 °C) (Tympanic)   Resp 16   Ht 5' 5" (1 651 m)   Wt (!) 155 kg (342 lb)   SpO2 98%   BMI 56 91 kg/m²      Physical Exam   Constitutional: She is oriented to person, place, and time  She appears well-developed and well-nourished  HENT:   Head: Normocephalic and atraumatic  Eyes: Pupils are equal, round, and reactive to light  EOM are normal    Neck: Normal range of motion  Neck supple  Cardiovascular: Normal rate, regular rhythm and normal heart sounds  Pulmonary/Chest: Effort normal and breath sounds normal    Abdominal: Soft  Bowel sounds are normal    Musculoskeletal: Normal range of motion  She exhibits no edema  Patient with negative Tinel's and Phalen's sign   Lymphadenopathy:     She has no cervical adenopathy  Neurological: She is alert and oriented to person, place, and time  No cranial nerve deficit  Skin: Skin is warm  Psychiatric: She has a normal mood and affect  Nursing note and vitals reviewed  BMI Counseling: Body mass index is 56 91 kg/m²  The BMI is above normal  Nutrition recommendations include reducing portion sizes, decreasing overall calorie intake and 3-5 servings of fruits/vegetables daily  Exercise recommendations include moderate aerobic physical activity for 150 minutes/week

## 2020-02-11 RX ORDER — FLUOXETINE HYDROCHLORIDE 20 MG/1
20 CAPSULE ORAL EVERY 24 HOURS
Qty: 90 CAPSULE | Refills: 0 | OUTPATIENT
Start: 2020-02-11

## 2020-02-25 DIAGNOSIS — I10 ESSENTIAL HYPERTENSION: ICD-10-CM

## 2020-02-25 RX ORDER — LISINOPRIL AND HYDROCHLOROTHIAZIDE 25; 20 MG/1; MG/1
1 TABLET ORAL DAILY
Qty: 90 TABLET | Refills: 0 | Status: SHIPPED | OUTPATIENT
Start: 2020-02-25 | End: 2020-05-22

## 2020-03-12 ENCOUNTER — OFFICE VISIT (OUTPATIENT)
Dept: OBGYN CLINIC | Facility: MEDICAL CENTER | Age: 40
End: 2020-03-12
Payer: COMMERCIAL

## 2020-03-12 VITALS
BODY MASS INDEX: 48.82 KG/M2 | DIASTOLIC BLOOD PRESSURE: 66 MMHG | HEART RATE: 69 BPM | WEIGHT: 293 LBS | HEIGHT: 65 IN | SYSTOLIC BLOOD PRESSURE: 92 MMHG

## 2020-03-12 DIAGNOSIS — G56.03 BILATERAL CARPAL TUNNEL SYNDROME: Primary | ICD-10-CM

## 2020-03-12 PROCEDURE — 99244 OFF/OP CNSLTJ NEW/EST MOD 40: CPT | Performed by: ORTHOPAEDIC SURGERY

## 2020-03-12 NOTE — PROGRESS NOTES
Chief Complaint     Bilateral hand numbness and tingling       History of Present Illness     Linn Dozier is a 44 y o  female who presents the office today for evaluation of her bilateral hand numbness and tingling  I am seeing her in consultation at the request of Dr Jacqueline Alarcon  Patient works in a [employer] factory  Patient states she has had bilateral numbness and tingling to all the digits for approximately 4 months now  She notes no incident of injury  She notes the the symptoms on the right side her worse than the left  Today she reports constant numbness and tingling  She notes the numbness gets worse with position such as holding her phone to her ear, or when she is driving  Her symptoms do not wake her from sleep however, she notices increased numbness and tingling in the morning  She denies dropping any objects  She has not tried any treatment options in regards to her bilateral hand numbness/tingling             Past Medical History:   Diagnosis Date    Depression     Hypertension        Past Surgical History:   Procedure Laterality Date    HERNIA REPAIR  1999       No Known Allergies    Current Outpatient Medications on File Prior to Visit   Medication Sig Dispense Refill    FLUoxetine (PROzac) 40 MG capsule Take 1 capsule (40 mg total) by mouth every 24 hours 30 capsule 3    lisinopril-hydrochlorothiazide (PRINZIDE,ZESTORETIC) 20-25 MG per tablet Take 1 tablet by mouth daily 90 tablet 0    LORazepam (ATIVAN) 0 5 mg tablet Take 1 tablet (0 5 mg total) by mouth every 8 (eight) hours as needed for anxiety 30 tablet 0    naproxen (NAPROSYN) 500 mg tablet Take 1 tablet (500 mg total) by mouth 2 (two) times a day with meals 30 tablet 1    cyclobenzaprine (FLEXERIL) 10 mg tablet Take 1 tablet (10 mg total) by mouth 3 (three) times a day as needed for muscle spasms (Patient not taking: Reported on 3/12/2020) 30 tablet 1    fluticasone (FLONASE) 50 mcg/act nasal spray Every 12 hours  omeprazole (PriLOSEC) 40 MG capsule Take 1 capsule (40 mg total) by mouth daily before breakfast (Patient not taking: Reported on 3/12/2020) 90 capsule 3     No current facility-administered medications on file prior to visit  Social History     Tobacco Use    Smoking status: Former Smoker     Types: Cigarettes     Last attempt to quit: 1/1/2005     Years since quitting: 15 2    Smokeless tobacco: Never Used    Tobacco comment: 10 per day  No passive smoke exposure   Substance Use Topics    Alcohol use: Yes    Drug use: No       Family History   Problem Relation Age of Onset    Hypertension Mother     Diabetes Mother         Mellitus    Pancreatic cancer Father     Diabetes Father         Mellitus    Coronary artery disease Father     Diabetes Family         Mellitus       Review of Systems     As stated in the HPI  All other systems were reviewed and are negative  Physical Exam     BP 92/66   Pulse 69   Ht 5' 5" (1 651 m)   Wt (!) 154 kg (340 lb)   BMI 56 58 kg/m²     GENERAL: This is a well-developed, well-nourished, age-appropriate patient in no acute distress  The patient is alert and oriented x3  Pleasant and cooperative  Eyes: Anicteric sclerae  Extraocular movements appear intact  HENT: Nares are patent with no drainage  Lungs: There is equal chest rise on inspection  Breathing is non-labored with no audible wheezing  Cardiovascular: No cyanosis  No upper extremity lymphadema  Skin: Skin is warm to touch  No obvious skin lesions or rashes other than described below  Neurologic: No ataxia  Psychiatric: Mood and affect are appropriate      Right upper extremity  Skin intact  No erythema or ecchymosis noted  No atrophy   No swelling noted  Negative Spurling's test  Full range of motion elbow, wrist and hand in all planes  Negative subluxation of ulnar nerve  Negative Tinel's at cubital tunnel  Negative flexion compression test at cubital tunnel  Negative Tinel's at carpal tunnel  Positive Durkan's compression test  5/5 Motor to the APB, FDI, FDP2, FDP5, EDC  Sensation intact to light touch in the median, radial, and ulnar nerve distribution  Left upper extremity  Skin intact  No erythema or ecchymosis noted  No atrophy  No swelling noted  Full range of motion elbow, wrist, and hand in all planes  Negative subluxation of ulnar nerve  Negative Tinel's and cubital tunnel  Negative flexion compression test at cubital tunnel  Negative Tinel's at carpal tunnel  Positive Durkan's compression test  5/5 Motor to the APB, FDI, FDP2, FDP5, EDC  Sensation intact to light touch in the median, radial, and ulnar nerve distribution  Data Review     Results Reviewed     None             Imaging:  None today    Assessment and Plan      Diagnoses and all orders for this visit:    Bilateral carpal tunnel syndrome  -     Ambulatory referral to Hand Surgery         35-year-old female with bilateral carpal tunnel syndrome  Patient and I discussed non operative of wrist bracing at night  We also discussed that based on her clinical exam today I do not believe that she has cubital tunnel syndrome however if she continues to note numbness/tingling into her small finger after nighttime bracing we may need to transition her to towel splinting  Patient was provided with bilateral wrist cock-up brace in the office today  I will follow-up with her in 6 weeks time for re-evaluation    We discussed the etiology and natural course of carpal tunnel syndrome  We discussed that carpal tunnel syndrome is related to increased pressure on the nerve in the carpal canal at the level of the wrist   Increasing pressure can be a result of wrist flexion or extension or changes to the contents of the carpal tunnel  We discussed that progression of this condition from mild to severe can result in numbness and tingling as well as dysfunction of the hand and even atrophy and weakness to the thumb musculature  Treatment options for this condition range from nonoperative to operative and the mainstays are nighttime splinting for nonoperative measures and carpal tunnel release for operative measures  Trial of nonoperative measures for around 6 weeks is typically beneficial prior to any surgical intervention and can help to avoid surgery  Surgical release is performed with a mini open approach and while it can be performed with local only or local and sedation, there are risks to the procedure that include bleeding, infection, damage to surrounding neurovascular structures, weakness, pillar pain, and persistence of symptoms  I also discussed with the patient that if carpal tunnel persists in both wrists that surgical intervention can be performed simultaneously and that recovery is expedited          Follow Up:  6 weeks    To Do Next Visit: re-evaluation     PROCEDURES PERFORMED:  Procedures  No Procedures performed today     Scribe Attestation    I,:   Shira Banks MA am acting as a scribe while in the presence of the attending physician :        I,:   Dragan Patiño MD personally performed the services described in this documentation    as scribed in my presence :

## 2020-03-18 ENCOUNTER — TELEPHONE (OUTPATIENT)
Dept: OTHER | Facility: OTHER | Age: 40
End: 2020-03-18

## 2020-05-04 DIAGNOSIS — F32.A DEPRESSION, UNSPECIFIED DEPRESSION TYPE: ICD-10-CM

## 2020-05-04 RX ORDER — FLUOXETINE HYDROCHLORIDE 40 MG/1
40 CAPSULE ORAL EVERY 24 HOURS
Qty: 90 CAPSULE | Refills: 1 | Status: SHIPPED | OUTPATIENT
Start: 2020-05-04 | End: 2020-11-10

## 2020-05-22 DIAGNOSIS — I10 ESSENTIAL HYPERTENSION: ICD-10-CM

## 2020-05-22 RX ORDER — LISINOPRIL AND HYDROCHLOROTHIAZIDE 25; 20 MG/1; MG/1
TABLET ORAL
Qty: 90 TABLET | Refills: 0 | Status: SHIPPED | OUTPATIENT
Start: 2020-05-22 | End: 2020-09-02

## 2020-07-28 ENCOUNTER — TELEMEDICINE (OUTPATIENT)
Dept: FAMILY MEDICINE CLINIC | Facility: CLINIC | Age: 40
End: 2020-07-28
Payer: COMMERCIAL

## 2020-07-28 VITALS — TEMPERATURE: 98.1 F

## 2020-07-28 DIAGNOSIS — J04.0 ACUTE LARYNGITIS: Primary | ICD-10-CM

## 2020-07-28 PROCEDURE — 99214 OFFICE O/P EST MOD 30 MIN: CPT | Performed by: FAMILY MEDICINE

## 2020-07-28 RX ORDER — AZITHROMYCIN 250 MG/1
TABLET, FILM COATED ORAL
Qty: 6 TABLET | Refills: 0 | Status: SHIPPED | OUTPATIENT
Start: 2020-07-28 | End: 2020-08-01

## 2020-07-28 RX ORDER — PREDNISONE 50 MG/1
50 TABLET ORAL DAILY
Qty: 5 TABLET | Refills: 0 | Status: SHIPPED | OUTPATIENT
Start: 2020-07-28 | End: 2020-08-02

## 2020-07-28 RX ORDER — BENZONATATE 200 MG/1
200 CAPSULE ORAL 3 TIMES DAILY PRN
Qty: 20 CAPSULE | Refills: 0 | Status: SHIPPED | OUTPATIENT
Start: 2020-07-28 | End: 2020-09-18

## 2020-07-28 NOTE — LETTER
July 28, 1272     Patient: Richy Linda   YOB: 1980   Date of Visit: 7/28/2020       To Whom it May Concern:    Richy Linda is under my professional care  She was evaluated virtually on 7/28/2020  She was diagnosed with acute laryngitis  She may return to work on 08/03/2020  If you have any questions or concerns, please don't hesitate to call           Sincerely,          Charlene Chen MD        CC: Richy Alexei

## 2020-07-28 NOTE — PROGRESS NOTES
COVID-19 Virtual Visit     Assessment/Plan:    Problem List Items Addressed This Visit        Respiratory    Acute laryngitis - Primary     Was given prescriptions for prednisone, Z-Oliver and Tessalon Perles  Encourage oral hydration  Get rest for 5 days  She was given excuse to this stay home until next Monday  Relevant Medications    azithromycin (ZITHROMAX) 250 mg tablet    predniSONE 50 mg tablet    benzonatate (TESSALON) 200 MG capsule        This virtual check-in was done via [employer] and patient was informed that this is not a secure, HIPAA-complaint platform  She agrees to proceed     Disposition:      After clarifying the patient's history, my suspicion for COVID-19 infection is very low    I spent 20 minutes with patient today in which greater than 50% of the time was spent in counseling/coordination of care regarding Increase oral hydration and rest for 5 days until her symptoms improved  Discussed about possible side effect of medications  Encounter provider Federico Penny MD    Provider located at [address] [place] [address],[address]   [address] [address]    Recent Visits  No visits were found meeting these conditions  Showing recent visits within past 7 days and meeting all other requirements     Today's Visits  Date Type Provider Dept   07/28/20 Ayleen Darby MD [facility]   Showing today's visits and meeting all other requirements     Future Appointments  No visits were found meeting these conditions  Showing future appointments within next 150 days and meeting all other requirements        Patient agrees to participate in a virtual check in via telephone or video visit instead of presenting to the office to address urgent/immediate medical needs  Patient is aware this is a billable service         After connecting through [employer], the patient was identified by name and date of birth  Jose Nj was informed that this was a telemedicine visit and that the exam was being conducted confidentially over secure lines  My office door was closed  No one else was in the room  Jose Nj acknowledged consent and understanding of privacy and security of the telemedicine visit  I informed the patient that I have reviewed her record in Epic and presented the opportunity for her to ask any questions regarding the visit today  The patient agreed to participate  Subjective  Jose Nj is a 36 y o  female who is concerned about COVID-19  She reports cough and sore throat  She has not experienced fever, chills, abdominal pain, diarrhea and vomiting She has not had contact with a person who is under investigation for or who is positive for COVID-19 within the last 14 days  She has not been hospitalized recently for fever and/or lower respiratory symptoms      Past Medical History:   Diagnosis Date    Depression     Hypertension        Past Surgical History:   Procedure Laterality Date    HERNIA REPAIR  1999       Current Outpatient Medications   Medication Sig Dispense Refill    FLUoxetine (PROzac) 40 MG capsule TAKE 1 CAPSULE (40 MG TOTAL) BY MOUTH EVERY 24 HOURS 90 capsule 1    fluticasone (FLONASE) 50 mcg/act nasal spray Every 12 hours      lisinopril-hydrochlorothiazide (PRINZIDE,ZESTORETIC) 20-25 MG per tablet TAKE 1 TABLET BY MOUTH EVERY DAY 90 tablet 0    LORazepam (ATIVAN) 0 5 mg tablet Take 1 tablet (0 5 mg total) by mouth every 8 (eight) hours as needed for anxiety 30 tablet 0    naproxen (NAPROSYN) 500 mg tablet Take 1 tablet (500 mg total) by mouth 2 (two) times a day with meals 30 tablet 1    azithromycin (ZITHROMAX) 250 mg tablet Take 2 tablets today then 1 tablet daily x 4 days 6 tablet 0    benzonatate (TESSALON) 200 MG capsule Take 1 capsule (200 mg total) by mouth 3 (three) times a day as needed for cough 20 capsule 0    cyclobenzaprine (FLEXERIL) 10 mg tablet Take 1 tablet (10 mg total) by mouth 3 (three) times a day as needed for muscle spasms (Patient not taking: Reported on 3/12/2020) 30 tablet 1    omeprazole (PriLOSEC) 40 MG capsule Take 1 capsule (40 mg total) by mouth daily before breakfast (Patient not taking: Reported on 3/12/2020) 90 capsule 3    predniSONE 50 mg tablet Take 1 tablet (50 mg total) by mouth daily for 5 days 5 tablet 0     No current facility-administered medications for this visit  No Known Allergies    Review of Systems   Constitutional: Negative for activity change, chills and fever  HENT: Positive for congestion, postnasal drip, rhinorrhea, sinus pressure and voice change  Eyes: Negative for visual disturbance  Respiratory: Positive for cough  Negative for apnea and shortness of breath  Cardiovascular: Negative for chest pain, palpitations and leg swelling  Gastrointestinal: Negative for abdominal pain, constipation, diarrhea and vomiting  Endocrine: Negative for cold intolerance and heat intolerance  Genitourinary: Negative for difficulty urinating and dysuria  Musculoskeletal: Negative for gait problem  Skin: Negative for rash  Neurological: Negative for dizziness, tremors, seizures and headaches  Hematological: Negative for adenopathy  Psychiatric/Behavioral: Negative for behavioral problems  Video Exam    Vitals:    07/28/20 0841   Temp: 98 1 °F (36 7 °C)   TempSrc: Tympanic         Antonella appears healthy, alert, no distress, cooperative, smiling  Physical Exam   Constitutional: She is oriented to person, place, and time  She appears well-developed and well-nourished  No distress  HENT:   Head: Normocephalic and atraumatic  Eyes: Conjunctivae are normal    Neck: Normal range of motion  Pulmonary/Chest: No respiratory distress  Neurological: She is alert and oriented to person, place, and time  Skin: No rash noted  She is not diaphoretic     Psychiatric: She has a normal mood and affect  Vitals reviewed  VIRTUAL VISIT DISCLAIMER    Glenna Meadows acknowledges that she has consented to an online visit or consultation  She understands that the online visit is based solely on information provided by her, and that, in the absence of a face-to-face physical evaluation by the physician, the diagnosis she receives is both limited and provisional in terms of accuracy and completeness  This is not intended to replace a full medical face-to-face evaluation by the physician  Glenna Meadows understands and accepts these terms

## 2020-07-28 NOTE — ASSESSMENT & PLAN NOTE
Was given prescriptions for prednisone, Z-Oliver and Tessalon Perles  Encourage oral hydration  Get rest for 5 days  She was given excuse to this stay home until next Monday

## 2020-09-02 DIAGNOSIS — I10 ESSENTIAL HYPERTENSION: ICD-10-CM

## 2020-09-02 RX ORDER — LISINOPRIL AND HYDROCHLOROTHIAZIDE 25; 20 MG/1; MG/1
TABLET ORAL
Qty: 90 TABLET | Refills: 0 | Status: SHIPPED | OUTPATIENT
Start: 2020-09-02 | End: 2020-12-09

## 2020-09-18 ENCOUNTER — OFFICE VISIT (OUTPATIENT)
Dept: FAMILY MEDICINE CLINIC | Facility: CLINIC | Age: 40
End: 2020-09-18
Payer: COMMERCIAL

## 2020-09-18 VITALS
WEIGHT: 293 LBS | BODY MASS INDEX: 48.82 KG/M2 | SYSTOLIC BLOOD PRESSURE: 126 MMHG | TEMPERATURE: 98.4 F | HEIGHT: 65 IN | OXYGEN SATURATION: 97 % | DIASTOLIC BLOOD PRESSURE: 60 MMHG | RESPIRATION RATE: 16 BRPM | HEART RATE: 97 BPM

## 2020-09-18 DIAGNOSIS — E66.01 MORBID OBESITY WITH BMI OF 50.0-59.9, ADULT (HCC): ICD-10-CM

## 2020-09-18 DIAGNOSIS — Z00.00 HEALTHCARE MAINTENANCE: Primary | ICD-10-CM

## 2020-09-18 DIAGNOSIS — Z12.31 ENCOUNTER FOR SCREENING MAMMOGRAM FOR MALIGNANT NEOPLASM OF BREAST: ICD-10-CM

## 2020-09-18 PROCEDURE — 99396 PREV VISIT EST AGE 40-64: CPT | Performed by: FAMILY MEDICINE

## 2020-09-18 NOTE — PROGRESS NOTES
Assessment/Plan:  Healthcare maintenance  Was discussed about immunizations, diet, exercise and safety measures  Diagnoses and all orders for this visit:    Healthcare maintenance    Encounter for screening mammogram for malignant neoplasm of breast  -     Mammo screening bilateral w cad; Future    Morbid obesity with BMI of 50 0-59 9, adult ([facility] [address] )          There are no Patient Instructions on file for this visit  Return in about 6 months (around 3/18/2021)  Subjective:      Patient ID: Natalya Forte is a 36 y o  female  Chief Complaint   Patient presents with    Physical Exam       She is here today for wellness exam   She has been taking her medication  She denies any side effects from her medication  She did not get her blood work done yet  The following portions of the patient's history were reviewed and updated as appropriate: allergies, current medications, past family history, past medical history, past social history, past surgical history and problem list     Review of Systems   Constitutional: Negative for chills and fever  HENT: Negative for trouble swallowing  Eyes: Negative for visual disturbance  Respiratory: Negative for cough and shortness of breath  Cardiovascular: Negative for chest pain, palpitations and leg swelling  Gastrointestinal: Negative for abdominal pain, constipation and diarrhea  Endocrine: Negative for cold intolerance and heat intolerance  Genitourinary: Negative for difficulty urinating and dysuria  Musculoskeletal: Negative for gait problem  Skin: Negative for rash  Neurological: Negative for dizziness, tremors, seizures and headaches  Hematological: Negative for adenopathy  Psychiatric/Behavioral: Negative for behavioral problems           Current Outpatient Medications   Medication Sig Dispense Refill    FLUoxetine (PROzac) 40 MG capsule TAKE 1 CAPSULE (40 MG TOTAL) BY MOUTH EVERY 24 HOURS 90 capsule 1    fluticasone (FLONASE) 50 mcg/act nasal spray Every 12 hours      lisinopril-hydrochlorothiazide (PRINZIDE,ZESTORETIC) 20-25 MG per tablet TAKE 1 TABLET BY MOUTH EVERY DAY 90 tablet 0     No current facility-administered medications for this visit  Objective:    /60 (BP Location: Left arm, Patient Position: Sitting, Cuff Size: Large)   Pulse 97   Temp 98 4 °F (36 9 °C) (Tympanic)   Resp 16   Ht 5' 5" (1 651 m)   Wt (!) 152 kg (335 lb)   SpO2 97%   BMI 55 75 kg/m²        Physical Exam  Vitals signs and nursing note reviewed  Constitutional:       Appearance: She is well-developed  HENT:      Head: Normocephalic and atraumatic  Eyes:      Pupils: Pupils are equal, round, and reactive to light  Neck:      Musculoskeletal: Normal range of motion and neck supple  Cardiovascular:      Rate and Rhythm: Normal rate and regular rhythm  Heart sounds: Normal heart sounds  Pulmonary:      Effort: Pulmonary effort is normal       Breath sounds: Normal breath sounds  Abdominal:      General: Bowel sounds are normal       Palpations: Abdomen is soft  Musculoskeletal: Normal range of motion  Lymphadenopathy:      Cervical: No cervical adenopathy  Skin:     General: Skin is warm  Neurological:      Mental Status: She is alert and oriented to person, place, and time  Cranial Nerves: No cranial nerve deficit  Charlotte Ling MD BMI Counseling: Body mass index is 55 75 kg/m²  The BMI is above normal  Nutrition recommendations include reducing portion sizes, decreasing overall calorie intake and 3-5 servings of fruits/vegetables daily  Exercise recommendations include moderate aerobic physical activity for 150 minutes/week

## 2020-11-10 DIAGNOSIS — F32.A DEPRESSION, UNSPECIFIED DEPRESSION TYPE: ICD-10-CM

## 2020-11-10 RX ORDER — FLUOXETINE HYDROCHLORIDE 40 MG/1
40 CAPSULE ORAL EVERY 24 HOURS
Qty: 90 CAPSULE | Refills: 1 | Status: SHIPPED | OUTPATIENT
Start: 2020-11-10 | End: 2021-05-17

## 2020-12-04 ENCOUNTER — HOSPITAL ENCOUNTER (OUTPATIENT)
Dept: RADIOLOGY | Facility: IMAGING CENTER | Age: 40
Discharge: HOME | End: 2020-12-04
Payer: COMMERCIAL

## 2020-12-04 VITALS — WEIGHT: 293 LBS | HEIGHT: 65 IN | BODY MASS INDEX: 48.82 KG/M2

## 2020-12-04 DIAGNOSIS — Z12.31 ENCOUNTER FOR SCREENING MAMMOGRAM FOR MALIGNANT NEOPLASM OF BREAST: ICD-10-CM

## 2020-12-04 PROCEDURE — 77067 SCR MAMMO BI INCL CAD: CPT

## 2020-12-08 DIAGNOSIS — I10 ESSENTIAL HYPERTENSION: ICD-10-CM

## 2020-12-09 RX ORDER — LISINOPRIL AND HYDROCHLOROTHIAZIDE 25; 20 MG/1; MG/1
TABLET ORAL
Qty: 90 TABLET | Refills: 0 | Status: SHIPPED | OUTPATIENT
Start: 2020-12-09 | End: 2021-03-05

## 2020-12-15 ENCOUNTER — TELEPHONE (OUTPATIENT)
Dept: FAMILY MEDICINE CLINIC | Facility: CLINIC | Age: 40
End: 2020-12-15

## 2021-01-04 ENCOUNTER — TELEMEDICINE (OUTPATIENT)
Dept: FAMILY MEDICINE CLINIC | Facility: CLINIC | Age: 41
End: 2021-01-04
Payer: COMMERCIAL

## 2021-01-04 VITALS — WEIGHT: 293 LBS | BODY MASS INDEX: 48.82 KG/M2 | TEMPERATURE: 97.5 F | HEIGHT: 65 IN

## 2021-01-04 DIAGNOSIS — B34.9 VIRAL INFECTION, UNSPECIFIED: ICD-10-CM

## 2021-01-04 DIAGNOSIS — B34.9 VIRAL INFECTION, UNSPECIFIED: Primary | ICD-10-CM

## 2021-01-04 PROCEDURE — U0003 INFECTIOUS AGENT DETECTION BY NUCLEIC ACID (DNA OR RNA); SEVERE ACUTE RESPIRATORY SYNDROME CORONAVIRUS 2 (SARS-COV-2) (CORONAVIRUS DISEASE [COVID-19]), AMPLIFIED PROBE TECHNIQUE, MAKING USE OF HIGH THROUGHPUT TECHNOLOGIES AS DESCRIBED BY CMS-2020-01-R: HCPCS | Performed by: FAMILY MEDICINE

## 2021-01-04 PROCEDURE — 99213 OFFICE O/P EST LOW 20 MIN: CPT | Performed by: FAMILY MEDICINE

## 2021-01-04 NOTE — ASSESSMENT & PLAN NOTE
This is day 7 since symptoms started  Respiratory status stable  Going to send her for testing  Encourage oral hydration  Well self quarantine until we get test results

## 2021-01-04 NOTE — PROGRESS NOTES
COVID-19 Virtual Visit     Assessment/Plan:    Problem List Items Addressed This Visit        Other    Viral infection, unspecified - Primary     This is day 7 since symptoms started  Respiratory status stable  Going to send her for testing  Encourage oral hydration  Well self quarantine until we get test results  Relevant Orders    Novel Coronavirus (COVID-19), PCR [facility] - Collected at [facility] or [facility]         Disposition:     I referred patient to one of our centralized sites for a COVID-19 swab  I have spent 15 minutes directly with the patient  Greater than 50% of this time was spent in counseling/coordination of care regarding: risks and benefits of treatment options, instructions for management and patient and family education  Encounter provider Amanda Chris MD    Provider located at [address] [place] [address],[address]   [address] [address]    Recent Visits  No visits were found meeting these conditions  Showing recent visits within past 7 days and meeting all other requirements     Today's Visits  Date Type Provider Dept   01/04/21 Ubaldo Chisholm MD [facility]   Showing today's visits and meeting all other requirements     Future Appointments  No visits were found meeting these conditions  Showing future appointments within next 150 days and meeting all other requirements      This virtual check-in was done via [employer] and patient was informed that this is not a secure, HIPAA-compliant platform  She agrees to proceed  Patient agrees to participate in a virtual check in via telephone or video visit instead of presenting to the office to address urgent/immediate medical needs  Patient is aware this is a billable service  After connecting through [facility], the patient was identified by name and date of birth   Anastasia Portal was informed that this was a telemedicine visit and that the exam was being conducted confidentially over secure lines  My office door was closed  Neeraj Luis acknowledged consent and understanding of privacy and security of the telemedicine visit  I informed the patient that I have reviewed her record in Epic and presented the opportunity for her to ask any questions regarding the visit today  The patient agreed to participate  Subjective:   Neeraj Luis is a 36 y o  female who is concerned about COVID-19  Date of symptom onset: 12/29/2020    Patient's symptoms include fatigue, nasal congestion and cough  Patient denies fever, chills, shortness of breath, abdominal pain, diarrhea and headaches       Exposure:   Contact with a person who is under investigation (PUI) for or who is positive for COVID-19 within the last 14 days?: No    Hospitalized recently for fever and/or lower respiratory symptoms?: No      Currently a healthcare worker that is involved in direct patient care?: No      Works in a special setting where the risk of COVID-19 transmission may be high? (this may include long-term care, correctional and correction facilities; homeless shelters; assisted-living facilities and group homes ): No      Resident in a special setting where the risk of COVID-19 transmission may be high? (this may include long-term care, correctional and correction facilities; homeless shelters; assisted-living facilities and group homes ): No      No results found for: [facility], [facility], [address] [place] [address],[address] [address]  Past Medical History:   Diagnosis Date    Depression     Hypertension      Past Surgical History:   Procedure Laterality Date    HERNIA REPAIR  1999     Current Outpatient Medications   Medication Sig Dispense Refill    FLUoxetine (PROzac) 40 MG capsule TAKE 1 CAPSULE (40 MG TOTAL) BY MOUTH EVERY 24 HOURS 90 capsule 1    lisinopril-hydrochlorothiazide (PRINZIDE,ZESTORETIC) 20-25 MG per tablet TAKE 1 TABLET BY MOUTH EVERY DAY 90 tablet 0    fluticasone (FLONASE) 50 mcg/act nasal spray Every 12 hours       No current facility-administered medications for this visit  Allergies   Allergen Reactions    Shellfish Allergy        Review of Systems   Constitutional: Positive for fatigue  Negative for chills and fever  HENT: Positive for congestion and postnasal drip  Negative for trouble swallowing  Eyes: Negative for visual disturbance  Respiratory: Positive for cough  Negative for shortness of breath  Cardiovascular: Negative for chest pain, palpitations and leg swelling  Gastrointestinal: Negative for abdominal pain, constipation and diarrhea  Endocrine: Negative for cold intolerance and heat intolerance  Genitourinary: Negative for difficulty urinating and dysuria  Musculoskeletal: Negative for gait problem  Skin: Negative for rash  Neurological: Negative for dizziness, tremors, seizures and headaches  Hematological: Negative for adenopathy  Psychiatric/Behavioral: Negative for behavioral problems  Objective:    Vitals:    01/04/21 1342   Temp: 97 5 °F (36 4 °C)   TempSrc: Temporal   Weight: (!) 154 kg (340 lb)   Height: 5' 5" (1 651 m)       Physical Exam  Vitals signs reviewed  Constitutional:       Appearance: Normal appearance  HENT:      Head: Normocephalic and atraumatic  Mouth/Throat:      Pharynx: Oropharynx is clear  Eyes:      Conjunctiva/sclera: Conjunctivae normal    Pulmonary:      Effort: No respiratory distress  Musculoskeletal: Normal range of motion  Skin:     Findings: No rash  Neurological:      Mental Status: She is alert  Mental status is at baseline  Psychiatric:         Mood and Affect: Mood normal        VIRTUAL VISIT DISCLAIMER    Tamra Disla acknowledges that she has consented to an online visit or consultation   She understands that the online visit is based solely on information provided by her, and that, in the absence of a face-to-face physical evaluation by the physician, the diagnosis she receives is both limited and provisional in terms of accuracy and completeness  This is not intended to replace a full medical face-to-face evaluation by the physician  Xenia Butt understands and accepts these terms

## 2021-01-05 ENCOUNTER — TELEPHONE (OUTPATIENT)
Dept: FAMILY MEDICINE CLINIC | Facility: CLINIC | Age: 41
End: 2021-01-05

## 2021-01-05 LAB — SARS-COV-2 RNA SPEC QL NAA+PROBE: NOT DETECTED

## 2021-01-05 NOTE — TELEPHONE ENCOUNTER
Patient aware she is negative but her mother, who she lives with, is positive  Has questions about this and asking for a call   Did advise quarantining    [phone]

## 2021-01-05 NOTE — TELEPHONE ENCOUNTER
----- Message from Liliam Cote MD sent at 1/5/2021  8:19 AM EST -----  COVID test came back negative

## 2021-01-13 ENCOUNTER — TELEPHONE (OUTPATIENT)
Dept: FAMILY MEDICINE CLINIC | Facility: CLINIC | Age: 41
End: 2021-01-13

## 2021-01-13 NOTE — TELEPHONE ENCOUNTER
Members of patient's family are covid positive and she is negative   She will quarantine with them for 10-14 days

## 2021-03-05 DIAGNOSIS — I10 ESSENTIAL HYPERTENSION: ICD-10-CM

## 2021-03-05 RX ORDER — LISINOPRIL AND HYDROCHLOROTHIAZIDE 25; 20 MG/1; MG/1
TABLET ORAL
Qty: 90 TABLET | Refills: 0 | Status: SHIPPED | OUTPATIENT
Start: 2021-03-05 | End: 2021-05-28

## 2021-03-18 ENCOUNTER — APPOINTMENT (OUTPATIENT)
Dept: LAB | Facility: IMAGING CENTER | Age: 41
End: 2021-03-18
Payer: COMMERCIAL

## 2021-03-18 ENCOUNTER — OFFICE VISIT (OUTPATIENT)
Dept: FAMILY MEDICINE CLINIC | Facility: CLINIC | Age: 41
End: 2021-03-18
Payer: COMMERCIAL

## 2021-03-18 VITALS
OXYGEN SATURATION: 97 % | DIASTOLIC BLOOD PRESSURE: 70 MMHG | RESPIRATION RATE: 16 BRPM | WEIGHT: 293 LBS | TEMPERATURE: 97.8 F | BODY MASS INDEX: 48.82 KG/M2 | HEIGHT: 65 IN | HEART RATE: 73 BPM | SYSTOLIC BLOOD PRESSURE: 110 MMHG

## 2021-03-18 DIAGNOSIS — E55.9 VITAMIN D INSUFFICIENCY: ICD-10-CM

## 2021-03-18 DIAGNOSIS — I10 ESSENTIAL HYPERTENSION: Primary | ICD-10-CM

## 2021-03-18 DIAGNOSIS — I10 ESSENTIAL HYPERTENSION: ICD-10-CM

## 2021-03-18 DIAGNOSIS — E78.49 OTHER HYPERLIPIDEMIA: ICD-10-CM

## 2021-03-18 DIAGNOSIS — R73.9 HYPERGLYCEMIA: ICD-10-CM

## 2021-03-18 DIAGNOSIS — E66.01 MORBID OBESITY WITH BMI OF 50.0-59.9, ADULT (HCC): ICD-10-CM

## 2021-03-18 DIAGNOSIS — F33.41 RECURRENT MAJOR DEPRESSIVE DISORDER, IN PARTIAL REMISSION (HCC): ICD-10-CM

## 2021-03-18 DIAGNOSIS — F41.9 ANXIETY: ICD-10-CM

## 2021-03-18 LAB
25(OH)D3 SERPL-MCNC: 10 NG/ML (ref 30–100)
ALBUMIN SERPL BCP-MCNC: 3.9 G/DL (ref 3.5–5)
ALP SERPL-CCNC: 49 U/L (ref 46–116)
ALT SERPL W P-5'-P-CCNC: 24 U/L (ref 12–78)
ANION GAP SERPL CALCULATED.3IONS-SCNC: 3 MMOL/L (ref 4–13)
AST SERPL W P-5'-P-CCNC: 23 U/L (ref 5–45)
BASOPHILS # BLD AUTO: 0.06 THOUSANDS/ΜL (ref 0–0.1)
BASOPHILS NFR BLD AUTO: 1 % (ref 0–1)
BILIRUB SERPL-MCNC: 0.47 MG/DL (ref 0.2–1)
BUN SERPL-MCNC: 16 MG/DL (ref 5–25)
CALCIUM SERPL-MCNC: 8.9 MG/DL (ref 8.3–10.1)
CHLORIDE SERPL-SCNC: 103 MMOL/L (ref 100–108)
CHOLEST SERPL-MCNC: 212 MG/DL (ref 50–200)
CO2 SERPL-SCNC: 29 MMOL/L (ref 21–32)
CREAT SERPL-MCNC: 0.95 MG/DL (ref 0.6–1.3)
EOSINOPHIL # BLD AUTO: 0.1 THOUSAND/ΜL (ref 0–0.61)
EOSINOPHIL NFR BLD AUTO: 2 % (ref 0–6)
ERYTHROCYTE [DISTWIDTH] IN BLOOD BY AUTOMATED COUNT: 12.9 % (ref 11.6–15.1)
EST. AVERAGE GLUCOSE BLD GHB EST-MCNC: 111 MG/DL
GFR SERPL CREATININE-BSD FRML MDRD: 75 ML/MIN/1.73SQ M
GLUCOSE P FAST SERPL-MCNC: 79 MG/DL (ref 65–99)
HBA1C MFR BLD: 5.5 %
HCT VFR BLD AUTO: 41.6 % (ref 34.8–46.1)
HDLC SERPL-MCNC: 49 MG/DL
HGB BLD-MCNC: 13.3 G/DL (ref 11.5–15.4)
IMM GRANULOCYTES # BLD AUTO: 0.01 THOUSAND/UL (ref 0–0.2)
IMM GRANULOCYTES NFR BLD AUTO: 0 % (ref 0–2)
LDLC SERPL CALC-MCNC: 134 MG/DL (ref 0–100)
LYMPHOCYTES # BLD AUTO: 2.12 THOUSANDS/ΜL (ref 0.6–4.47)
LYMPHOCYTES NFR BLD AUTO: 35 % (ref 14–44)
MCH RBC QN AUTO: 28.6 PG (ref 26.8–34.3)
MCHC RBC AUTO-ENTMCNC: 32 G/DL (ref 31.4–37.4)
MCV RBC AUTO: 90 FL (ref 82–98)
MONOCYTES # BLD AUTO: 0.4 THOUSAND/ΜL (ref 0.17–1.22)
MONOCYTES NFR BLD AUTO: 7 % (ref 4–12)
NEUTROPHILS # BLD AUTO: 3.4 THOUSANDS/ΜL (ref 1.85–7.62)
NEUTS SEG NFR BLD AUTO: 55 % (ref 43–75)
NRBC BLD AUTO-RTO: 0 /100 WBCS
PLATELET # BLD AUTO: 377 THOUSANDS/UL (ref 149–390)
PMV BLD AUTO: 10.1 FL (ref 8.9–12.7)
POTASSIUM SERPL-SCNC: 4.2 MMOL/L (ref 3.5–5.3)
PROT SERPL-MCNC: 7.4 G/DL (ref 6.4–8.2)
RBC # BLD AUTO: 4.65 MILLION/UL (ref 3.81–5.12)
SODIUM SERPL-SCNC: 135 MMOL/L (ref 136–145)
TRIGL SERPL-MCNC: 146 MG/DL
TSH SERPL DL<=0.05 MIU/L-ACNC: 0.89 UIU/ML (ref 0.36–3.74)
WBC # BLD AUTO: 6.09 THOUSAND/UL (ref 4.31–10.16)

## 2021-03-18 PROCEDURE — 83036 HEMOGLOBIN GLYCOSYLATED A1C: CPT

## 2021-03-18 PROCEDURE — 85025 COMPLETE CBC W/AUTO DIFF WBC: CPT

## 2021-03-18 PROCEDURE — 82306 VITAMIN D 25 HYDROXY: CPT

## 2021-03-18 PROCEDURE — 80061 LIPID PANEL: CPT

## 2021-03-18 PROCEDURE — 80053 COMPREHEN METABOLIC PANEL: CPT

## 2021-03-18 PROCEDURE — 36415 COLL VENOUS BLD VENIPUNCTURE: CPT

## 2021-03-18 PROCEDURE — 99214 OFFICE O/P EST MOD 30 MIN: CPT | Performed by: FAMILY MEDICINE

## 2021-03-18 PROCEDURE — 84443 ASSAY THYROID STIM HORMONE: CPT

## 2021-03-18 NOTE — ASSESSMENT & PLAN NOTE
It was discussed about low-fat diet and regular exercise  Will continue to monitor fasting lipid profile

## 2021-03-18 NOTE — PROGRESS NOTES
Assessment/Plan:    Essential hypertension    Well controlled  Continue same  Will continue to monitor  Recurrent major depressive disorder, in partial remission (HCC)    Stable  Continue same  Will continue to monitor  Other hyperlipidemia    It was discussed about low-fat diet and regular exercise  Will continue to monitor fasting lipid profile  Anxiety    Stable  Continue same  Will continue to monitor  Hyperglycemia    Discussed about low carb diet  Will continue to monitor A1c  Problem List Items Addressed This Visit        Cardiovascular and Mediastinum    Essential hypertension - Primary       Well controlled  Continue same  Will continue to monitor  Relevant Orders    CBC and differential    Comprehensive metabolic panel    TSH, 3rd generation with Free T4 reflex       Other    Recurrent major depressive disorder, in partial remission (HCC)       Stable  Continue same  Will continue to monitor  Anxiety       Stable  Continue same  Will continue to monitor  Other hyperlipidemia       It was discussed about low-fat diet and regular exercise  Will continue to monitor fasting lipid profile  Relevant Orders    CBC and differential    Comprehensive metabolic panel    Lipid Panel with Direct LDL reflex    TSH, 3rd generation with Free T4 reflex    Hyperglycemia       Discussed about low carb diet  Will continue to monitor A1c  Relevant Orders    Hemoglobin A1C      Other Visit Diagnoses     Vitamin D insufficiency        Relevant Orders    Vitamin D 25 hydroxy            Subjective:      Patient ID: Cyndee Perry is a 36 y o  female  Patient is presenting for 6 month follow up  Patient does not have any complaints today  She is taking her medications and denies any side effects  She got her mammogram 12/202 but she did not get her blood work done       Depression  Pertinent negatives include no abdominal pain, chest pain, chills, coughing, fever, headaches or rash  Hypertension  Pertinent negatives include no chest pain, headaches, palpitations or shortness of breath  The following portions of the patient's history were reviewed and updated as appropriate: allergies, current medications, past family history, past medical history, past social history, past surgical history and problem list     Review of Systems   Constitutional: Negative for chills and fever  HENT: Negative for trouble swallowing  Eyes: Negative for visual disturbance  Respiratory: Negative for cough and shortness of breath  Cardiovascular: Negative for chest pain, palpitations and leg swelling  Gastrointestinal: Negative for abdominal pain, constipation and diarrhea  Endocrine: Negative for cold intolerance and heat intolerance  Genitourinary: Negative for difficulty urinating and dysuria  Musculoskeletal: Negative for gait problem  Skin: Negative for rash  Neurological: Negative for dizziness, tremors, seizures and headaches  Hematological: Negative for adenopathy  Psychiatric/Behavioral: Positive for depression  Negative for behavioral problems  Objective:      /70 (BP Location: Left arm, Patient Position: Sitting, Cuff Size: Large)   Pulse 73   Temp 97 8 °F (36 6 °C) (Tympanic)   Resp 16   Ht 5' 5" (1 651 m)   Wt (!) 150 kg (330 lb)   SpO2 97%   BMI 54 91 kg/m²          Physical Exam  Vitals signs and nursing note reviewed  Constitutional:       Appearance: She is well-developed  HENT:      Head: Normocephalic and atraumatic  Eyes:      Pupils: Pupils are equal, round, and reactive to light  Neck:      Musculoskeletal: Normal range of motion and neck supple  Cardiovascular:      Rate and Rhythm: Normal rate and regular rhythm  Heart sounds: Normal heart sounds  Pulmonary:      Effort: Pulmonary effort is normal       Breath sounds: Normal breath sounds     Abdominal:      General: Bowel sounds are normal       Palpations: Abdomen is soft  Musculoskeletal: Normal range of motion  Lymphadenopathy:      Cervical: No cervical adenopathy  Skin:     General: Skin is warm  Neurological:      Mental Status: She is alert and oriented to person, place, and time  Cranial Nerves: No cranial nerve deficit  BMI Counseling: Body mass index is 54 91 kg/m²  The BMI is above normal  Nutrition recommendations include reducing portion sizes, decreasing overall calorie intake, 3-5 servings of fruits/vegetables daily and reducing fast food intake  Exercise recommendations include moderate aerobic physical activity for 150 minutes/week

## 2021-03-19 DIAGNOSIS — E55.9 VITAMIN D DEFICIENCY: Primary | ICD-10-CM

## 2021-03-19 RX ORDER — ERGOCALCIFEROL 1.25 MG/1
50000 CAPSULE ORAL WEEKLY
Qty: 4 CAPSULE | Refills: 3 | Status: SHIPPED | OUTPATIENT
Start: 2021-03-19 | End: 2021-06-08

## 2021-03-22 ENCOUNTER — TELEPHONE (OUTPATIENT)
Dept: FAMILY MEDICINE CLINIC | Facility: CLINIC | Age: 41
End: 2021-03-22

## 2021-03-22 NOTE — TELEPHONE ENCOUNTER
----- Message from [facility] sent at 3/19/2021  8:49 AM EDT -----  - Her cholesterol is slightly elevated  I recommend low fat diet and regular exercise  - Her vitamin D came back very low  I recommend Vitamin D 50,000 IU weekly  I placed an order in the system  - All other labs are stable

## 2021-03-26 ENCOUNTER — TELEPHONE (OUTPATIENT)
Dept: FAMILY MEDICINE CLINIC | Facility: CLINIC | Age: 41
End: 2021-03-26

## 2021-05-17 DIAGNOSIS — F32.A DEPRESSION, UNSPECIFIED DEPRESSION TYPE: ICD-10-CM

## 2021-05-17 RX ORDER — FLUOXETINE HYDROCHLORIDE 40 MG/1
40 CAPSULE ORAL EVERY 24 HOURS
Qty: 90 CAPSULE | Refills: 1 | Status: SHIPPED | OUTPATIENT
Start: 2021-05-17 | End: 2021-11-17

## 2021-05-28 DIAGNOSIS — I10 ESSENTIAL HYPERTENSION: ICD-10-CM

## 2021-05-28 RX ORDER — LISINOPRIL AND HYDROCHLOROTHIAZIDE 25; 20 MG/1; MG/1
TABLET ORAL
Qty: 90 TABLET | Refills: 0 | Status: SHIPPED | OUTPATIENT
Start: 2021-05-28 | End: 2021-09-03

## 2021-06-06 DIAGNOSIS — E55.9 VITAMIN D DEFICIENCY: ICD-10-CM

## 2021-06-08 RX ORDER — ERGOCALCIFEROL 1.25 MG/1
CAPSULE ORAL
Qty: 12 CAPSULE | Refills: 1 | Status: SHIPPED | OUTPATIENT
Start: 2021-06-08 | End: 2021-12-06

## 2021-09-03 DIAGNOSIS — I10 ESSENTIAL HYPERTENSION: ICD-10-CM

## 2021-09-03 RX ORDER — LISINOPRIL AND HYDROCHLOROTHIAZIDE 25; 20 MG/1; MG/1
TABLET ORAL
Qty: 90 TABLET | Refills: 0 | Status: SHIPPED | OUTPATIENT
Start: 2021-09-03 | End: 2021-12-06

## 2021-09-20 ENCOUNTER — OFFICE VISIT (OUTPATIENT)
Dept: FAMILY MEDICINE CLINIC | Facility: CLINIC | Age: 41
End: 2021-09-20
Payer: COMMERCIAL

## 2021-09-20 VITALS
WEIGHT: 293 LBS | SYSTOLIC BLOOD PRESSURE: 128 MMHG | OXYGEN SATURATION: 96 % | HEART RATE: 83 BPM | TEMPERATURE: 97.5 F | HEIGHT: 65 IN | DIASTOLIC BLOOD PRESSURE: 70 MMHG | BODY MASS INDEX: 48.82 KG/M2 | RESPIRATION RATE: 16 BRPM

## 2021-09-20 DIAGNOSIS — Z00.00 HEALTHCARE MAINTENANCE: Primary | ICD-10-CM

## 2021-09-20 DIAGNOSIS — E78.49 OTHER HYPERLIPIDEMIA: ICD-10-CM

## 2021-09-20 DIAGNOSIS — F33.9 DEPRESSION, RECURRENT (HCC): ICD-10-CM

## 2021-09-20 DIAGNOSIS — E66.01 MORBID OBESITY WITH BMI OF 50.0-59.9, ADULT (HCC): ICD-10-CM

## 2021-09-20 DIAGNOSIS — I10 ESSENTIAL HYPERTENSION: ICD-10-CM

## 2021-09-20 DIAGNOSIS — Z12.31 ENCOUNTER FOR SCREENING MAMMOGRAM FOR MALIGNANT NEOPLASM OF BREAST: ICD-10-CM

## 2021-09-20 DIAGNOSIS — J45.909 UNCOMPLICATED ASTHMA, UNSPECIFIED ASTHMA SEVERITY, UNSPECIFIED WHETHER PERSISTENT: ICD-10-CM

## 2021-09-20 DIAGNOSIS — E55.9 VITAMIN D INSUFFICIENCY: ICD-10-CM

## 2021-09-20 PROCEDURE — 99396 PREV VISIT EST AGE 40-64: CPT | Performed by: FAMILY MEDICINE

## 2021-09-20 NOTE — PROGRESS NOTES
Assessment/Plan:  Healthcare maintenance    It was discussed about immunizations, diet, exercise and safety measures  Other hyperlipidemia     Not well controlled  It was discussed about low-fat diet and regular exercise  Continue to monitor  Depression, recurrent ([facility] )    Stable  Continue same  Continue to monitor  Essential hypertension    Well controlled  Continue same  Will continue to monitor  Asthma    Stable  Continue to monitor  Diagnoses and all orders for this visit:    Healthcare maintenance    Encounter for screening mammogram for malignant neoplasm of breast  -     Mammo screening bilateral w 3d & cad; Future    Other hyperlipidemia  -     Comprehensive metabolic panel; Future  -     Lipid Panel with Direct LDL reflex; Future  -     TSH, 3rd generation with Free T4 reflex; Future    Vitamin D insufficiency  -     Vitamin D 25 hydroxy; Future    Uncomplicated asthma, unspecified asthma severity, unspecified whether persistent    Depression, recurrent ([facility] )    Essential hypertension    Morbid obesity with BMI of 50 0-59 9, adult ([facility] )          There are no Patient Instructions on file for this visit  Return in about 6 months (around 3/20/2022)  Subjective:      Patient ID: Joshua Upton is a 39 y o  female  Chief Complaint   Patient presents with    Depression     follow up    Hypertension        She is here today for wellness exam   She has been taking her medications  Denies any side effects from her medications  She denies any complaint today  Her blood work in March showed low vitamin-D and high cholesterol  She has been taking vitamin-D 62109 unit weekly  The following portions of the patient's history were reviewed and updated as appropriate: allergies, current medications, past family history, past medical history, past social history, past surgical history and problem list     Review of Systems   Constitutional: Negative for chills and fever     HENT: Negative for trouble swallowing  Eyes: Negative for visual disturbance  Respiratory: Negative for cough and shortness of breath  Cardiovascular: Negative for chest pain, palpitations and leg swelling  Gastrointestinal: Negative for abdominal pain, constipation and diarrhea  Endocrine: Negative for cold intolerance and heat intolerance  Genitourinary: Negative for difficulty urinating and dysuria  Musculoskeletal: Negative for gait problem  Skin: Negative for rash  Neurological: Negative for dizziness, tremors, seizures and headaches  Hematological: Negative for adenopathy  Psychiatric/Behavioral: Negative for behavioral problems  Current Outpatient Medications   Medication Sig Dispense Refill    ergocalciferol (VITAMIN D2) 50,000 units TAKE 1 CAPSULE BY MOUTH ONE TIME PER WEEK 12 capsule 1    FLUoxetine (PROzac) 40 MG capsule TAKE 1 CAPSULE (40 MG TOTAL) BY MOUTH EVERY 24 HOURS 90 capsule 1    fluticasone (FLONASE) 50 mcg/act nasal spray Every 12 hours      lisinopril-hydrochlorothiazide (PRINZIDE,ZESTORETIC) 20-25 MG per tablet TAKE 1 TABLET BY MOUTH EVERY DAY 90 tablet 0     No current facility-administered medications for this visit  Objective:    /70 (BP Location: Left arm, Patient Position: Sitting, Cuff Size: Large)   Pulse 83   Temp 97 5 °F (36 4 °C) (Tympanic)   Resp 16   Ht 5' 5" (1 651 m)   Wt (!) 156 kg (344 lb)   SpO2 96%   BMI 57 24 kg/m²        Physical Exam  Vitals and nursing note reviewed  Constitutional:       Appearance: She is well-developed  HENT:      Head: Normocephalic and atraumatic  Eyes:      Pupils: Pupils are equal, round, and reactive to light  Cardiovascular:      Rate and Rhythm: Normal rate and regular rhythm  Heart sounds: Normal heart sounds  Pulmonary:      Effort: Pulmonary effort is normal       Breath sounds: Normal breath sounds     Abdominal:      General: Bowel sounds are normal       Palpations: Abdomen is soft    Musculoskeletal:         General: Normal range of motion  Cervical back: Normal range of motion and neck supple  Lymphadenopathy:      Cervical: No cervical adenopathy  Skin:     General: Skin is warm  Neurological:      Mental Status: She is alert and oriented to person, place, and time  Cranial Nerves: No cranial nerve deficit  Estrada Fontenot MD BMI Counseling: Body mass index is 57 24 kg/m²  The BMI is above normal  Nutrition recommendations include reducing portion sizes, decreasing overall calorie intake and 3-5 servings of fruits/vegetables daily  Exercise recommendations include moderate aerobic physical activity for 150 minutes/week

## 2021-11-17 DIAGNOSIS — F32.A DEPRESSION, UNSPECIFIED DEPRESSION TYPE: ICD-10-CM

## 2021-11-17 RX ORDER — FLUOXETINE HYDROCHLORIDE 40 MG/1
40 CAPSULE ORAL EVERY 24 HOURS
Qty: 90 CAPSULE | Refills: 1 | Status: SHIPPED | OUTPATIENT
Start: 2021-11-17 | End: 2022-05-19

## 2021-12-04 DIAGNOSIS — I10 ESSENTIAL HYPERTENSION: ICD-10-CM

## 2021-12-06 DIAGNOSIS — E55.9 VITAMIN D DEFICIENCY: ICD-10-CM

## 2021-12-06 DIAGNOSIS — R05.9 COUGH: Primary | ICD-10-CM

## 2021-12-06 PROCEDURE — 0241U HB NFCT DS VIR RESP RNA 4 TRGT: CPT | Performed by: NURSE PRACTITIONER

## 2021-12-06 RX ORDER — ERGOCALCIFEROL 1.25 MG/1
CAPSULE ORAL
Qty: 12 CAPSULE | Refills: 1 | Status: SHIPPED | OUTPATIENT
Start: 2021-12-06 | End: 2022-05-20

## 2021-12-06 RX ORDER — LISINOPRIL AND HYDROCHLOROTHIAZIDE 25; 20 MG/1; MG/1
TABLET ORAL
Qty: 90 TABLET | Refills: 0 | Status: SHIPPED | OUTPATIENT
Start: 2021-12-06 | End: 2022-03-03

## 2021-12-07 ENCOUNTER — TELEMEDICINE (OUTPATIENT)
Dept: FAMILY MEDICINE CLINIC | Facility: CLINIC | Age: 41
End: 2021-12-07
Payer: COMMERCIAL

## 2021-12-07 VITALS — BODY MASS INDEX: 48.82 KG/M2 | WEIGHT: 293 LBS | HEIGHT: 65 IN

## 2021-12-07 DIAGNOSIS — J21.0 ACUTE BRONCHIOLITIS DUE TO RESPIRATORY SYNCYTIAL VIRUS (RSV): ICD-10-CM

## 2021-12-07 DIAGNOSIS — R05.9 COUGH: Primary | ICD-10-CM

## 2021-12-07 PROCEDURE — 3008F BODY MASS INDEX DOCD: CPT | Performed by: FAMILY MEDICINE

## 2021-12-07 PROCEDURE — 99214 OFFICE O/P EST MOD 30 MIN: CPT | Performed by: FAMILY MEDICINE

## 2021-12-07 RX ORDER — ALBUTEROL SULFATE 90 UG/1
2 AEROSOL, METERED RESPIRATORY (INHALATION) EVERY 6 HOURS PRN
Qty: 18 G | Refills: 1 | Status: SHIPPED | OUTPATIENT
Start: 2021-12-07

## 2021-12-07 RX ORDER — GUAIFENESIN AND CODEINE PHOSPHATE 100; 10 MG/5ML; MG/5ML
5 SOLUTION ORAL 3 TIMES DAILY PRN
Qty: 180 ML | Refills: 0 | Status: SHIPPED | OUTPATIENT
Start: 2021-12-07 | End: 2022-03-21

## 2021-12-07 RX ORDER — PREDNISONE 50 MG/1
50 TABLET ORAL DAILY
Qty: 5 TABLET | Refills: 0 | Status: SHIPPED | OUTPATIENT
Start: 2021-12-07 | End: 2021-12-12

## 2022-01-03 ENCOUNTER — TELEPHONE (OUTPATIENT)
Dept: FAMILY MEDICINE CLINIC | Facility: CLINIC | Age: 42
End: 2022-01-03

## 2022-01-03 DIAGNOSIS — R09.81 NASAL CONGESTION: Primary | ICD-10-CM

## 2022-01-03 PROCEDURE — U0003 INFECTIOUS AGENT DETECTION BY NUCLEIC ACID (DNA OR RNA); SEVERE ACUTE RESPIRATORY SYNDROME CORONAVIRUS 2 (SARS-COV-2) (CORONAVIRUS DISEASE [COVID-19]), AMPLIFIED PROBE TECHNIQUE, MAKING USE OF HIGH THROUGHPUT TECHNOLOGIES AS DESCRIBED BY CMS-2020-01-R: HCPCS | Performed by: FAMILY MEDICINE

## 2022-01-03 PROCEDURE — U0005 INFEC AGEN DETEC AMPLI PROBE: HCPCS | Performed by: FAMILY MEDICINE

## 2022-01-03 NOTE — TELEPHONE ENCOUNTER
Patient left a message stating she has headaches, congestion, fatigue for 2 days, was exposed to covid on 12/31    Asking for a covid test

## 2022-01-03 NOTE — TELEPHONE ENCOUNTER
Phone call from [place], states she had exposure to covid, now she has Nasal & throat congestion, headache x 3days & fatigue  Please advise pt at [phone]

## 2022-01-04 ENCOUNTER — TELEPHONE (OUTPATIENT)
Dept: FAMILY MEDICINE CLINIC | Facility: CLINIC | Age: 42
End: 2022-01-04

## 2022-01-04 NOTE — TELEPHONE ENCOUNTER
----- Message from Karl Cowan MD sent at 1/4/2022  4:23 PM EST -----  Castro test came back positive

## 2022-02-10 ENCOUNTER — HOSPITAL ENCOUNTER (OUTPATIENT)
Dept: RADIOLOGY | Facility: IMAGING CENTER | Age: 42
Discharge: HOME | End: 2022-02-10
Payer: COMMERCIAL

## 2022-02-10 ENCOUNTER — APPOINTMENT (OUTPATIENT)
Dept: LAB | Facility: IMAGING CENTER | Age: 42
End: 2022-02-10
Payer: COMMERCIAL

## 2022-02-10 VITALS — BODY MASS INDEX: 48.82 KG/M2 | WEIGHT: 293 LBS | HEIGHT: 65 IN

## 2022-02-10 DIAGNOSIS — E55.9 VITAMIN D INSUFFICIENCY: ICD-10-CM

## 2022-02-10 DIAGNOSIS — E78.49 OTHER HYPERLIPIDEMIA: ICD-10-CM

## 2022-02-10 DIAGNOSIS — Z12.31 ENCOUNTER FOR SCREENING MAMMOGRAM FOR MALIGNANT NEOPLASM OF BREAST: ICD-10-CM

## 2022-02-10 LAB
25(OH)D3 SERPL-MCNC: 24.8 NG/ML (ref 30–100)
ALBUMIN SERPL BCP-MCNC: 3.9 G/DL (ref 3.5–5)
ALP SERPL-CCNC: 54 U/L (ref 46–116)
ALT SERPL W P-5'-P-CCNC: 31 U/L (ref 12–78)
ANION GAP SERPL CALCULATED.3IONS-SCNC: 4 MMOL/L (ref 4–13)
AST SERPL W P-5'-P-CCNC: 21 U/L (ref 5–45)
BILIRUB SERPL-MCNC: 0.64 MG/DL (ref 0.2–1)
BUN SERPL-MCNC: 18 MG/DL (ref 5–25)
CALCIUM SERPL-MCNC: 9.5 MG/DL (ref 8.3–10.1)
CHLORIDE SERPL-SCNC: 100 MMOL/L (ref 100–108)
CHOLEST SERPL-MCNC: 206 MG/DL
CO2 SERPL-SCNC: 30 MMOL/L (ref 21–32)
CREAT SERPL-MCNC: 0.91 MG/DL (ref 0.6–1.3)
GFR SERPL CREATININE-BSD FRML MDRD: 78 ML/MIN/1.73SQ M
GLUCOSE P FAST SERPL-MCNC: 107 MG/DL (ref 65–99)
HDLC SERPL-MCNC: 48 MG/DL
LDLC SERPL CALC-MCNC: 116 MG/DL (ref 0–100)
POTASSIUM SERPL-SCNC: 4.4 MMOL/L (ref 3.5–5.3)
PROT SERPL-MCNC: 7.6 G/DL (ref 6.4–8.2)
SODIUM SERPL-SCNC: 134 MMOL/L (ref 136–145)
TRIGL SERPL-MCNC: 210 MG/DL
TSH SERPL DL<=0.05 MIU/L-ACNC: 1.07 UIU/ML (ref 0.36–3.74)

## 2022-02-10 PROCEDURE — 36415 COLL VENOUS BLD VENIPUNCTURE: CPT

## 2022-02-10 PROCEDURE — 77067 SCR MAMMO BI INCL CAD: CPT

## 2022-02-10 PROCEDURE — 80053 COMPREHEN METABOLIC PANEL: CPT

## 2022-02-10 PROCEDURE — 84443 ASSAY THYROID STIM HORMONE: CPT

## 2022-02-10 PROCEDURE — 80061 LIPID PANEL: CPT

## 2022-02-10 PROCEDURE — 82306 VITAMIN D 25 HYDROXY: CPT

## 2022-02-10 PROCEDURE — 77063 BREAST TOMOSYNTHESIS BI: CPT

## 2022-02-14 ENCOUNTER — TELEPHONE (OUTPATIENT)
Dept: FAMILY MEDICINE CLINIC | Facility: CLINIC | Age: 42
End: 2022-02-14

## 2022-02-14 NOTE — TELEPHONE ENCOUNTER
----- Message from Tory Browne MD sent at 2/14/2022  8:25 AM EST -----  Normal mammogram   Continue routine screening with mammogram in 1 year

## 2022-02-22 ENCOUNTER — TELEPHONE (OUTPATIENT)
Dept: FAMILY MEDICINE CLINIC | Facility: CLINIC | Age: 42
End: 2022-02-22

## 2022-02-22 NOTE — TELEPHONE ENCOUNTER
Pt aware of lab results  She is taking her Vit D 50,000 iu weekly and will keep her appt   Next month,

## 2022-02-22 NOTE — TELEPHONE ENCOUNTER
----- Message from Cristhian Goel MD sent at 2/22/2022  6:37 AM EST -----  Blood work came back showing low vitamin-D but improving from before  I recommend vitamin-D 11024 units weekly  Her fasting glucose and triglyceride, cholesterol are elevated  I recommend low-fat and low carb diet  I will discuss further at her next appointment

## 2022-03-03 DIAGNOSIS — I10 ESSENTIAL HYPERTENSION: ICD-10-CM

## 2022-03-03 RX ORDER — LISINOPRIL AND HYDROCHLOROTHIAZIDE 25; 20 MG/1; MG/1
TABLET ORAL
Qty: 90 TABLET | Refills: 0 | Status: SHIPPED | OUTPATIENT
Start: 2022-03-03 | End: 2022-06-01

## 2022-03-21 ENCOUNTER — OFFICE VISIT (OUTPATIENT)
Dept: FAMILY MEDICINE CLINIC | Facility: CLINIC | Age: 42
End: 2022-03-21
Payer: COMMERCIAL

## 2022-03-21 VITALS
HEART RATE: 95 BPM | DIASTOLIC BLOOD PRESSURE: 74 MMHG | HEIGHT: 65 IN | WEIGHT: 293 LBS | TEMPERATURE: 97.8 F | OXYGEN SATURATION: 96 % | BODY MASS INDEX: 48.82 KG/M2 | SYSTOLIC BLOOD PRESSURE: 118 MMHG

## 2022-03-21 DIAGNOSIS — R73.9 HYPERGLYCEMIA: ICD-10-CM

## 2022-03-21 DIAGNOSIS — J45.909 UNCOMPLICATED ASTHMA, UNSPECIFIED ASTHMA SEVERITY, UNSPECIFIED WHETHER PERSISTENT: ICD-10-CM

## 2022-03-21 DIAGNOSIS — I10 ESSENTIAL HYPERTENSION: Primary | ICD-10-CM

## 2022-03-21 DIAGNOSIS — F33.41 RECURRENT MAJOR DEPRESSIVE DISORDER, IN PARTIAL REMISSION (HCC): ICD-10-CM

## 2022-03-21 DIAGNOSIS — E66.01 MORBID OBESITY WITH BMI OF 50.0-59.9, ADULT (HCC): ICD-10-CM

## 2022-03-21 DIAGNOSIS — E55.9 VITAMIN D INSUFFICIENCY: ICD-10-CM

## 2022-03-21 DIAGNOSIS — E78.49 OTHER HYPERLIPIDEMIA: ICD-10-CM

## 2022-03-21 PROCEDURE — 3078F DIAST BP <80 MM HG: CPT | Performed by: FAMILY MEDICINE

## 2022-03-21 PROCEDURE — 99214 OFFICE O/P EST MOD 30 MIN: CPT | Performed by: FAMILY MEDICINE

## 2022-03-21 PROCEDURE — 3074F SYST BP LT 130 MM HG: CPT | Performed by: FAMILY MEDICINE

## 2022-03-21 PROCEDURE — 3008F BODY MASS INDEX DOCD: CPT | Performed by: FAMILY MEDICINE

## 2022-03-21 RX ORDER — ROSUVASTATIN CALCIUM 5 MG/1
5 TABLET, COATED ORAL DAILY
Qty: 90 TABLET | Refills: 3 | Status: SHIPPED | OUTPATIENT
Start: 2022-03-21

## 2022-03-21 NOTE — ASSESSMENT & PLAN NOTE
Discussed lipid lowering therapy  Patient agreeable to start Crestor 10 mg daily  Discussed side effects including elevation of liver enzymes and myopathy  Continue diet and lifestyle modifications  Will monitor CMP and lipid panel in 3 months  Time allowed for questions, patient agreed with plan as outlined above  Call office with questions or concerns  Follow up in 3 months

## 2022-03-21 NOTE — PROGRESS NOTES
Assessment/Plan:    Essential hypertension  Well controlled  Continue lisinopril-HCTZ daily  Monitor BP at home  Will continue to monitor  Call office with questions or concerns  Follow up in 3 months  Recurrent major depressive disorder, in partial remission ([facility] )  Well controlled  Continue Prozac daily  Will continue to monitor  Call office with questions or concerns  Follow up in 3 months or sooner if needed  Other hyperlipidemia  Discussed lipid lowering therapy  Patient agreeable to start Crestor 10 mg daily  Discussed side effects including elevation of liver enzymes and myopathy  Continue diet and lifestyle modifications  Will monitor CMP and lipid panel in 3 months  Time allowed for questions, patient agreed with plan as outlined above  Call office with questions or concerns  Follow up in 3 months  Hyperglycemia  Fasting glucose elevated at 107 mg/dL on recent CMP  HbA1c ordered, to be completed prior to next visit  Encouraged lifestyle and diet modifications, including limiting simple carbohydrates  Time allowed for questions, patient agreed with plan as outlined above  Call office with questions or concerns  Follow up in 3 months  Vitamin D insufficiency  Continue 50,000 units vitamin D per week  Encouraged going outside into the sun for at least 20 minutes daily  Will continue to monitor with vitamin D level, to be completed prior to next appointment  Call office with questions or concerns  Follow up in 3 months  Asthma  Well controlled  Use albuterol PRN SOB  Will continue to monitor  Problem List Items Addressed This Visit        Respiratory    Asthma     Well controlled  Use albuterol PRN SOB  Will continue to monitor  Cardiovascular and Mediastinum    Essential hypertension - Primary     Well controlled  Continue lisinopril-HCTZ daily  Monitor BP at home  Will continue to monitor  Call office with questions or concerns  Follow up in 3 months           Relevant Orders CBC and differential    Comprehensive metabolic panel    Lipid Panel with Direct LDL reflex    TSH, 3rd generation with Free T4 reflex       Other    Recurrent major depressive disorder, in partial remission ([address] )     Well controlled  Continue Prozac daily  Will continue to monitor  Call office with questions or concerns  Follow up in 3 months or sooner if needed  Other hyperlipidemia     Discussed lipid lowering therapy  Patient agreeable to start Crestor 10 mg daily  Discussed side effects including elevation of liver enzymes and myopathy  Continue diet and lifestyle modifications  Will monitor CMP and lipid panel in 3 months  Time allowed for questions, patient agreed with plan as outlined above  Call office with questions or concerns  Follow up in 3 months  Relevant Medications    rosuvastatin (CRESTOR) 5 mg tablet    Other Relevant Orders    CBC and differential    Comprehensive metabolic panel    Lipid Panel with Direct LDL reflex    TSH, 3rd generation with Free T4 reflex    Hyperglycemia     Fasting glucose elevated at 107 mg/dL on recent CMP  HbA1c ordered, to be completed prior to next visit  Encouraged lifestyle and diet modifications, including limiting simple carbohydrates  Time allowed for questions, patient agreed with plan as outlined above  Call office with questions or concerns  Follow up in 3 months  Relevant Orders    Hemoglobin A1C    Vitamin D insufficiency     Continue 50,000 units vitamin D per week  Encouraged going outside into the sun for at least 20 minutes daily  Will continue to monitor with vitamin D level, to be completed prior to next appointment  Call office with questions or concerns  Follow up in 3 months  Relevant Orders    Vitamin D 25 hydroxy      Other Visit Diagnoses     Morbid obesity with BMI of 50 0-59 9, adult (HCC)                Subjective:      Patient ID: Roshan Kimball is a 43 y o  female      Nikhil Lim is a pleasant 43year old F with a PMH significant for HTN, HLD, vitamin D deficiency and depression presenting for a 6 month follow up  Today, the patient has no complaints or concerns  States she is taking all medications as prescribed and tolerating it well without side effects  The following portions of the patient's history were reviewed and updated as appropriate: allergies, current medications, past family history, past medical history, past social history, past surgical history and problem list     Review of Systems   Respiratory: Negative for cough and shortness of breath  Cardiovascular: Negative for chest pain, palpitations and leg swelling  Gastrointestinal: Negative for abdominal pain, constipation and diarrhea  All other systems reviewed and are negative  Objective:    /74 (BP Location: Left arm, Patient Position: Sitting, Cuff Size: Extra-Large)   Pulse 95   Temp 97 8 °F (36 6 °C) (Tympanic)   Ht 5' 5" (1 651 m)   Wt (!) 159 kg (350 lb 6 4 oz)   SpO2 96%   BMI 58 31 kg/m²      Physical Exam  Vitals and nursing note reviewed  Constitutional:       General: She is not in acute distress  Appearance: Normal appearance  She is obese  She is not ill-appearing, toxic-appearing or diaphoretic  HENT:      Head: Normocephalic and atraumatic  Right Ear: Tympanic membrane normal       Left Ear: Tympanic membrane normal    Eyes:      Extraocular Movements: Extraocular movements intact  Conjunctiva/sclera: Conjunctivae normal    Cardiovascular:      Rate and Rhythm: Normal rate and regular rhythm  Pulmonary:      Effort: Pulmonary effort is normal       Breath sounds: Normal breath sounds  Musculoskeletal:         General: Normal range of motion  Cervical back: Normal range of motion  Skin:     General: Skin is warm and dry  Neurological:      General: No focal deficit present  Mental Status: She is alert     Psychiatric:         Mood and Affect: Mood normal          Behavior: Behavior normal        BMI Counseling: Body mass index is 58 31 kg/m²  The BMI is above normal  Nutrition recommendations include reducing portion sizes, decreasing overall calorie intake and 3-5 servings of fruits/vegetables daily  Exercise recommendations include moderate aerobic physical activity for 150 minutes/week

## 2022-03-21 NOTE — ASSESSMENT & PLAN NOTE
Fasting glucose elevated at 107 mg/dL on recent CMP  HbA1c ordered, to be completed prior to next visit  Encouraged lifestyle and diet modifications, including limiting simple carbohydrates  Time allowed for questions, patient agreed with plan as outlined above  Call office with questions or concerns  Follow up in 3 months

## 2022-03-21 NOTE — ASSESSMENT & PLAN NOTE
Well controlled  Continue Prozac daily  Will continue to monitor  Call office with questions or concerns  Follow up in 3 months or sooner if needed

## 2022-03-21 NOTE — ASSESSMENT & PLAN NOTE
Continue 50,000 units vitamin D per week  Encouraged going outside into the sun for at least 20 minutes daily  Will continue to monitor with vitamin D level, to be completed prior to next appointment  Call office with questions or concerns  Follow up in 3 months

## 2022-03-21 NOTE — ASSESSMENT & PLAN NOTE
Well controlled  Continue lisinopril-HCTZ daily  Monitor BP at home  Will continue to monitor  Call office with questions or concerns  Follow up in 3 months

## 2022-05-19 DIAGNOSIS — F32.A DEPRESSION, UNSPECIFIED DEPRESSION TYPE: ICD-10-CM

## 2022-05-19 RX ORDER — FLUOXETINE HYDROCHLORIDE 40 MG/1
40 CAPSULE ORAL EVERY 24 HOURS
Qty: 90 CAPSULE | Refills: 1 | Status: SHIPPED | OUTPATIENT
Start: 2022-05-19

## 2022-05-19 NOTE — TELEPHONE ENCOUNTER
Pharmacy requesting refill on prozac  Last seen 3/21/22  Has appt 6/20/22  Medication sent to pharmacy

## 2022-05-20 DIAGNOSIS — E55.9 VITAMIN D DEFICIENCY: ICD-10-CM

## 2022-05-20 RX ORDER — ERGOCALCIFEROL 1.25 MG/1
CAPSULE ORAL
Qty: 12 CAPSULE | Refills: 1 | Status: SHIPPED | OUTPATIENT
Start: 2022-05-20

## 2022-06-01 DIAGNOSIS — I10 ESSENTIAL HYPERTENSION: ICD-10-CM

## 2022-06-01 RX ORDER — LISINOPRIL AND HYDROCHLOROTHIAZIDE 25; 20 MG/1; MG/1
TABLET ORAL
Qty: 90 TABLET | Refills: 0 | Status: SHIPPED | OUTPATIENT
Start: 2022-06-01

## 2022-06-20 ENCOUNTER — TELEPHONE (OUTPATIENT)
Dept: OTHER | Facility: OTHER | Age: 42
End: 2022-06-20

## 2022-08-10 ENCOUNTER — OFFICE VISIT (OUTPATIENT)
Dept: FAMILY MEDICINE CLINIC | Facility: CLINIC | Age: 42
End: 2022-08-10
Payer: COMMERCIAL

## 2022-08-10 VITALS
WEIGHT: 293 LBS | TEMPERATURE: 98.2 F | DIASTOLIC BLOOD PRESSURE: 70 MMHG | HEART RATE: 81 BPM | BODY MASS INDEX: 48.82 KG/M2 | RESPIRATION RATE: 16 BRPM | HEIGHT: 65 IN | SYSTOLIC BLOOD PRESSURE: 118 MMHG | OXYGEN SATURATION: 98 %

## 2022-08-10 DIAGNOSIS — G89.29 CHRONIC LEFT-SIDED LOW BACK PAIN WITHOUT SCIATICA: ICD-10-CM

## 2022-08-10 DIAGNOSIS — H60.391 OTHER INFECTIVE CHRONIC OTITIS EXTERNA OF RIGHT EAR: Primary | ICD-10-CM

## 2022-08-10 DIAGNOSIS — J45.20 MILD INTERMITTENT ASTHMA WITHOUT COMPLICATION: ICD-10-CM

## 2022-08-10 DIAGNOSIS — R73.9 HYPERGLYCEMIA: ICD-10-CM

## 2022-08-10 DIAGNOSIS — F33.41 RECURRENT MAJOR DEPRESSIVE DISORDER, IN PARTIAL REMISSION (HCC): ICD-10-CM

## 2022-08-10 DIAGNOSIS — M54.50 CHRONIC LEFT-SIDED LOW BACK PAIN WITHOUT SCIATICA: ICD-10-CM

## 2022-08-10 DIAGNOSIS — E78.49 OTHER HYPERLIPIDEMIA: ICD-10-CM

## 2022-08-10 DIAGNOSIS — F41.9 ANXIETY: ICD-10-CM

## 2022-08-10 DIAGNOSIS — I10 ESSENTIAL HYPERTENSION: ICD-10-CM

## 2022-08-10 DIAGNOSIS — Z00.00 HEALTHCARE MAINTENANCE: ICD-10-CM

## 2022-08-10 DIAGNOSIS — M54.50 ACUTE LEFT-SIDED LOW BACK PAIN WITHOUT SCIATICA: ICD-10-CM

## 2022-08-10 PROCEDURE — 99214 OFFICE O/P EST MOD 30 MIN: CPT | Performed by: FAMILY MEDICINE

## 2022-08-10 PROCEDURE — 99396 PREV VISIT EST AGE 40-64: CPT | Performed by: FAMILY MEDICINE

## 2022-08-10 PROCEDURE — 3074F SYST BP LT 130 MM HG: CPT | Performed by: FAMILY MEDICINE

## 2022-08-10 PROCEDURE — 3078F DIAST BP <80 MM HG: CPT | Performed by: FAMILY MEDICINE

## 2022-08-10 RX ORDER — MELOXICAM 15 MG/1
15 TABLET ORAL DAILY
Qty: 30 TABLET | Refills: 1 | Status: SHIPPED | OUTPATIENT
Start: 2022-08-10

## 2022-08-10 RX ORDER — NEOMYCIN SULFATE, POLYMYXIN B SULFATE AND HYDROCORTISONE 10; 3.5; 1 MG/ML; MG/ML; [USP'U]/ML
4 SUSPENSION/ DROPS AURICULAR (OTIC) 4 TIMES DAILY
Qty: 10 ML | Refills: 0 | Status: SHIPPED | OUTPATIENT
Start: 2022-08-10

## 2022-08-10 RX ORDER — METHOCARBAMOL 500 MG/1
500 TABLET, FILM COATED ORAL
Qty: 30 TABLET | Refills: 1 | Status: SHIPPED | OUTPATIENT
Start: 2022-08-10

## 2022-08-11 PROBLEM — H60.90 OTITIS EXTERNA: Status: ACTIVE | Noted: 2022-08-11

## 2022-08-11 PROBLEM — G89.29 CHRONIC LEFT-SIDED LOW BACK PAIN WITHOUT SCIATICA: Status: ACTIVE | Noted: 2019-06-11

## 2022-08-11 NOTE — PROGRESS NOTES
Assessment/Plan:  Asthma    Stable  Continue same  Will continue to monitor  Essential hypertension    Well controlled  Continue same  Will continue to monitor  Recurrent major depressive disorder, in partial remission ([facility])    Stable  Continue on the Prozac  Will continue to monitor  Other hyperlipidemia    Not well controlled but improving  Continue on Crestor  Discussed with patient about low-fat diet and regular exercise  Discussed with patient about getting her blood work done  Hyperglycemia    Discussed about low carb diet and regular exercise  I am going to check her A1c  Healthcare maintenance    It was discussed about immunizations, diet, exercise and safety measures  Anxiety    Stable  Continue same  Will continue to monitor  Chronic left-sided low back pain without sciatica    She was given prescription for meloxicam and Robaxin  Discussed about low back exercises  If symptoms are not improving call back and I will consider referral to physical therapy    Otitis externa   She was given prescription for Cortisporin ear drop  Diagnoses and all orders for this visit:    Other infective chronic otitis externa of right ear  -     neomycin-polymyxin-hydrocortisone (CORTISPORIN) 0 35%-10,000 units/mL-1% otic suspension; Administer 4 drops into both ears 4 (four) times a day    Chronic left-sided low back pain without sciatica  -     meloxicam (Mobic) 15 mg tablet; Take 1 tablet (15 mg total) by mouth daily  -     methocarbamol (ROBAXIN) 500 mg tablet; Take 1 tablet (500 mg total) by mouth daily at bedtime as needed for muscle spasms    Mild intermittent asthma without complication    Essential hypertension    Recurrent major depressive disorder, in partial remission ([address] )    Other hyperlipidemia    Hyperglycemia    Healthcare maintenance    Anxiety    Acute left-sided low back pain without sciatica        There are no Patient Instructions on file for this visit      Return in about 6 months (around 2/10/2023)  Subjective:      Patient ID: Pasquale Oneill is a 43 y o  female  Chief Complaint   Patient presents with    Hypertension    Hyperlipidemia    Depression    Back Pain        She is here today for follow-up multiple medical problems and for wellness exam   She has been taking her medications  Denies any side effects  She did not get her blood work done yet  She complained of low back pain on the left side  Of her lower back  Denies any recent history of injury or trauma  Also complained of itchy tender right ear worse in the last few days  Denies any upper respiratory symptoms  The following portions of the patient's history were reviewed and updated as appropriate: allergies, current medications, past family history, past medical history, past social history, past surgical history and problem list     Review of Systems   Constitutional: Negative for chills and fever  HENT: Positive for ear pain  Negative for trouble swallowing  Eyes: Negative for visual disturbance  Respiratory: Negative for cough and shortness of breath  Cardiovascular: Negative for chest pain, palpitations and leg swelling  Gastrointestinal: Negative for abdominal pain, constipation and diarrhea  Endocrine: Negative for cold intolerance and heat intolerance  Genitourinary: Negative for difficulty urinating and dysuria  Musculoskeletal: Positive for back pain  Skin: Negative for rash  Neurological: Negative for dizziness, tremors, seizures and headaches  Hematological: Negative for adenopathy  Psychiatric/Behavioral: Negative for behavioral problems           Current Outpatient Medications   Medication Sig Dispense Refill    albuterol (Ventolin HFA) 90 mcg/act inhaler Inhale 2 puffs every 6 (six) hours as needed for wheezing 18 g 1    ergocalciferol (VITAMIN D2) 50,000 units TAKE 1 CAPSULE BY MOUTH ONE TIME PER WEEK 12 capsule 1    FLUoxetine (PROzac) 40 MG capsule TAKE 1 CAPSULE (40 MG TOTAL) BY MOUTH EVERY 24 HOURS 90 capsule 1    fluticasone (FLONASE) 50 mcg/act nasal spray Every 12 hours      lisinopril-hydrochlorothiazide (PRINZIDE,ZESTORETIC) 20-25 MG per tablet TAKE 1 TABLET BY MOUTH EVERY DAY 90 tablet 0    meloxicam (Mobic) 15 mg tablet Take 1 tablet (15 mg total) by mouth daily 30 tablet 1    methocarbamol (ROBAXIN) 500 mg tablet Take 1 tablet (500 mg total) by mouth daily at bedtime as needed for muscle spasms 30 tablet 1    neomycin-polymyxin-hydrocortisone (CORTISPORIN) 0 35%-10,000 units/mL-1% otic suspension Administer 4 drops into both ears 4 (four) times a day 10 mL 0    rosuvastatin (CRESTOR) 5 mg tablet Take 1 tablet (5 mg total) by mouth daily 90 tablet 3     No current facility-administered medications for this visit  Objective:    /70 (BP Location: Left arm, Patient Position: Sitting, Cuff Size: Large)   Pulse 81   Temp 98 2 °F (36 8 °C) (Tympanic)   Resp 16   Ht 5' 5" (1 651 m)   Wt (!) 163 kg (360 lb 6 4 oz)   SpO2 98%   BMI 59 97 kg/m²        Physical Exam  Vitals and nursing note reviewed  Constitutional:       Appearance: Normal appearance  She is well-developed  HENT:      Head: Normocephalic and atraumatic  Right Ear: Swelling present  Eyes:      Pupils: Pupils are equal, round, and reactive to light  Cardiovascular:      Rate and Rhythm: Normal rate and regular rhythm  Heart sounds: Normal heart sounds  Pulmonary:      Effort: Pulmonary effort is normal       Breath sounds: Normal breath sounds  Abdominal:      General: Bowel sounds are normal       Palpations: Abdomen is soft  Musculoskeletal:         General: Tenderness (Tenderness to palpation over the left lumbar paraspinal area   straight leg raising test was negative ) present  Normal range of motion  Cervical back: Normal range of motion and neck supple  Lymphadenopathy:      Cervical: No cervical adenopathy     Skin: General: Skin is warm  Neurological:      Mental Status: She is alert and oriented to person, place, and time  Cranial Nerves: No cranial nerve deficit                  Charlene Chen MD

## 2022-08-11 NOTE — ASSESSMENT & PLAN NOTE
She was given prescription for meloxicam and Robaxin  Discussed about low back exercises    If symptoms are not improving call back and I will consider referral to physical therapy

## 2022-08-11 NOTE — ASSESSMENT & PLAN NOTE
Not well controlled but improving  Continue on Crestor  Discussed with patient about low-fat diet and regular exercise  Discussed with patient about getting her blood work done

## 2022-08-11 NOTE — PROGRESS NOTES
Assessment/Plan:  Asthma    Stable  Continue same  Will continue to monitor  Essential hypertension    Well controlled  Continue same  Will continue to monitor  Recurrent major depressive disorder, in partial remission ([facility])    Stable  Continue on the Prozac  Will continue to monitor  Other hyperlipidemia    Not well controlled but improving  Continue on Crestor  Discussed with patient about low-fat diet and regular exercise  Discussed with patient about getting her blood work done  Hyperglycemia    Discussed about low carb diet and regular exercise  I am going to check her A1c  Healthcare maintenance    It was discussed about immunizations, diet, exercise and safety measures  Anxiety    Stable  Continue same  Will continue to monitor  Chronic left-sided low back pain without sciatica    She was given prescription for meloxicam and Robaxin  Discussed about low back exercises  If symptoms are not improving call back and I will consider referral to physical therapy    Otitis externa   She was given prescription for Cortisporin ear drop  Diagnoses and all orders for this visit:    Other infective chronic otitis externa of right ear  -     neomycin-polymyxin-hydrocortisone (CORTISPORIN) 0 35%-10,000 units/mL-1% otic suspension; Administer 4 drops into both ears 4 (four) times a day    Chronic left-sided low back pain without sciatica  -     meloxicam (Mobic) 15 mg tablet; Take 1 tablet (15 mg total) by mouth daily  -     methocarbamol (ROBAXIN) 500 mg tablet; Take 1 tablet (500 mg total) by mouth daily at bedtime as needed for muscle spasms    Mild intermittent asthma without complication    Essential hypertension    Recurrent major depressive disorder, in partial remission ([address] )    Other hyperlipidemia    Hyperglycemia    Healthcare maintenance    Anxiety    Acute left-sided low back pain without sciatica        There are no Patient Instructions on file for this visit      Return in about 6 months (around 2/10/2023)  Subjective:      Patient ID: Jose Nj is a 43 y o  female  Chief Complaint   Patient presents with    Hypertension    Hyperlipidemia    Depression    Back Pain        She is here today for follow-up multiple medical problems and for wellness exam   She has been taking her medications  Denies any side effects  She did not get her blood work done yet  She complained of low back pain on the left side  Of her lower back  Denies any recent history of injury or trauma  Also complained of itchy tender right ear worse in the last few days  Denies any upper respiratory symptoms  Hypertension  Pertinent negatives include no chest pain, headaches, palpitations or shortness of breath  Hyperlipidemia  Pertinent negatives include no chest pain or shortness of breath  Depression  Pertinent negatives include no abdominal pain, chest pain, chills, coughing, fever, headaches or rash  Back Pain  Pertinent negatives include no abdominal pain, chest pain, dysuria, fever or headaches  The following portions of the patient's history were reviewed and updated as appropriate: allergies, current medications, past family history, past medical history, past social history, past surgical history and problem list     Review of Systems   Constitutional: Negative for chills and fever  HENT: Positive for ear pain  Negative for trouble swallowing  Eyes: Negative for visual disturbance  Respiratory: Negative for cough and shortness of breath  Cardiovascular: Negative for chest pain, palpitations and leg swelling  Gastrointestinal: Negative for abdominal pain, constipation and diarrhea  Endocrine: Negative for cold intolerance and heat intolerance  Genitourinary: Negative for difficulty urinating and dysuria  Musculoskeletal: Positive for back pain  Skin: Negative for rash  Neurological: Negative for dizziness, tremors, seizures and headaches  Hematological: Negative for adenopathy  Psychiatric/Behavioral: Positive for depression  Negative for behavioral problems  Current Outpatient Medications   Medication Sig Dispense Refill    albuterol (Ventolin HFA) 90 mcg/act inhaler Inhale 2 puffs every 6 (six) hours as needed for wheezing 18 g 1    ergocalciferol (VITAMIN D2) 50,000 units TAKE 1 CAPSULE BY MOUTH ONE TIME PER WEEK 12 capsule 1    FLUoxetine (PROzac) 40 MG capsule TAKE 1 CAPSULE (40 MG TOTAL) BY MOUTH EVERY 24 HOURS 90 capsule 1    fluticasone (FLONASE) 50 mcg/act nasal spray Every 12 hours      lisinopril-hydrochlorothiazide (PRINZIDE,ZESTORETIC) 20-25 MG per tablet TAKE 1 TABLET BY MOUTH EVERY DAY 90 tablet 0    meloxicam (Mobic) 15 mg tablet Take 1 tablet (15 mg total) by mouth daily 30 tablet 1    methocarbamol (ROBAXIN) 500 mg tablet Take 1 tablet (500 mg total) by mouth daily at bedtime as needed for muscle spasms 30 tablet 1    neomycin-polymyxin-hydrocortisone (CORTISPORIN) 0 35%-10,000 units/mL-1% otic suspension Administer 4 drops into both ears 4 (four) times a day 10 mL 0    rosuvastatin (CRESTOR) 5 mg tablet Take 1 tablet (5 mg total) by mouth daily 90 tablet 3     No current facility-administered medications for this visit  Objective:    /70 (BP Location: Left arm, Patient Position: Sitting, Cuff Size: Large)   Pulse 81   Temp 98 2 °F (36 8 °C) (Tympanic)   Resp 16   Ht 5' 5" (1 651 m)   Wt (!) 163 kg (360 lb 6 4 oz)   SpO2 98%   BMI 59 97 kg/m²        Physical Exam  Vitals and nursing note reviewed  Constitutional:       Appearance: Normal appearance  She is well-developed  HENT:      Head: Normocephalic and atraumatic  Right Ear: Swelling present  Eyes:      Pupils: Pupils are equal, round, and reactive to light  Cardiovascular:      Rate and Rhythm: Normal rate and regular rhythm  Heart sounds: Normal heart sounds     Pulmonary:      Effort: Pulmonary effort is normal  Breath sounds: Normal breath sounds  Abdominal:      General: Bowel sounds are normal       Palpations: Abdomen is soft  Musculoskeletal:         General: Tenderness (Tenderness to palpation over the left lumbar paraspinal area   straight leg raising test was negative ) present  Normal range of motion  Cervical back: Normal range of motion and neck supple  Lymphadenopathy:      Cervical: No cervical adenopathy  Skin:     General: Skin is warm  Neurological:      Mental Status: She is alert and oriented to person, place, and time  Cranial Nerves: No cranial nerve deficit                  Suzanne Osorio MD

## 2022-09-04 DIAGNOSIS — I10 ESSENTIAL HYPERTENSION: ICD-10-CM

## 2022-09-06 RX ORDER — LISINOPRIL AND HYDROCHLOROTHIAZIDE 25; 20 MG/1; MG/1
TABLET ORAL
Qty: 90 TABLET | Refills: 1 | Status: SHIPPED | OUTPATIENT
Start: 2022-09-06

## 2022-09-06 NOTE — TELEPHONE ENCOUNTER
Pharmacy requesting refill on lisinopril/hctc  Last seen 8/10/22  Has appt 2/22/23  Medication sent to pharmacy

## 2022-10-12 PROBLEM — J21.0 ACUTE BRONCHIOLITIS DUE TO RESPIRATORY SYNCYTIAL VIRUS (RSV): Status: RESOLVED | Noted: 2021-12-07 | Resolved: 2022-10-12

## 2022-10-12 PROBLEM — R05.9 COUGH: Status: RESOLVED | Noted: 2021-12-07 | Resolved: 2022-10-12

## 2022-11-23 DIAGNOSIS — F32.A DEPRESSION, UNSPECIFIED DEPRESSION TYPE: ICD-10-CM

## 2022-11-23 RX ORDER — FLUOXETINE HYDROCHLORIDE 40 MG/1
CAPSULE ORAL
Qty: 90 CAPSULE | Refills: 0 | Status: SHIPPED | OUTPATIENT
Start: 2022-11-23

## 2022-12-19 DIAGNOSIS — E55.9 VITAMIN D DEFICIENCY: ICD-10-CM

## 2022-12-20 RX ORDER — ERGOCALCIFEROL 1.25 MG/1
CAPSULE ORAL
Qty: 12 CAPSULE | Refills: 1 | Status: SHIPPED | OUTPATIENT
Start: 2022-12-20

## 2022-12-20 NOTE — TELEPHONE ENCOUNTER
Pharmacy requesting refill on vitamin d  Last seen 8/10/22  Has appt 2/22/23  Medication sent to pharmacy

## 2023-01-09 ENCOUNTER — NURSE TRIAGE (OUTPATIENT)
Dept: OTHER | Facility: OTHER | Age: 43
End: 2023-01-09

## 2023-01-09 NOTE — TELEPHONE ENCOUNTER
Reason for Disposition  • Earache    Answer Assessment - Initial Assessment Questions  1  ONSET: "When did the nasal discharge start?"         Congestion ear clog headaches  Denies SOB chest chest tightness      2  AMOUNT: "How much discharge is there?"       Mild     3  COUGH: "Do you have a cough?" If yes, ask: "Describe the color of your sputum" (clear, white, yellow, green)     Mild     4  RESPIRATORY DISTRESS: "Describe your breathing "      WNL     5  FEVER: "Do you have a fever?" If Yes, ask: "What is your temperature, how was it measured, and when did it start?"      Denies     6  SEVERITY: "Overall, how bad are you feeling right now?" (e g , doesn't interfere with normal activities, staying home from school/work, staying in bed)       Mild   7   OTHER SYMPTOMS: "Do you have any other symptoms?" (e g , sore throat, earache, wheezing, vomiting)      Denies    Protocols used: COMMON COLD-ADULT-

## 2023-01-09 NOTE — TELEPHONE ENCOUNTER
Regarding: congestion, cough and headache  ----- Message from [facility] sent at 1/9/2023  6:42 PM EST -----  "I have been congested for over a month now, I cant hear out of my right ear, I also have a cough and a headache"

## 2023-01-09 NOTE — TELEPHONE ENCOUNTER
Patient called in with cold /cough symptoms and requested sick appointment for tomorrow    Appointment made for 1 10 2023 @ 920 am

## 2023-01-10 ENCOUNTER — OFFICE VISIT (OUTPATIENT)
Dept: FAMILY MEDICINE CLINIC | Facility: CLINIC | Age: 43
End: 2023-01-10

## 2023-01-10 VITALS
HEART RATE: 88 BPM | OXYGEN SATURATION: 99 % | SYSTOLIC BLOOD PRESSURE: 100 MMHG | TEMPERATURE: 97.9 F | HEIGHT: 65 IN | WEIGHT: 293 LBS | BODY MASS INDEX: 48.82 KG/M2 | DIASTOLIC BLOOD PRESSURE: 64 MMHG

## 2023-01-10 DIAGNOSIS — R05.1 ACUTE COUGH: ICD-10-CM

## 2023-01-10 DIAGNOSIS — I10 ESSENTIAL HYPERTENSION: ICD-10-CM

## 2023-01-10 DIAGNOSIS — J01.10 ACUTE NON-RECURRENT FRONTAL SINUSITIS: Primary | ICD-10-CM

## 2023-01-10 RX ORDER — PREDNISONE 50 MG/1
TABLET ORAL
Qty: 5 TABLET | Refills: 0 | OUTPATIENT
Start: 2023-01-10

## 2023-01-10 RX ORDER — AMOXICILLIN AND CLAVULANATE POTASSIUM 875; 125 MG/1; MG/1
1 TABLET, FILM COATED ORAL EVERY 12 HOURS SCHEDULED
Qty: 20 TABLET | Refills: 0 | Status: SHIPPED | OUTPATIENT
Start: 2023-01-10 | End: 2023-01-20

## 2023-01-10 RX ORDER — PREDNISONE 50 MG/1
50 TABLET ORAL DAILY
Qty: 5 TABLET | Refills: 0 | Status: SHIPPED | OUTPATIENT
Start: 2023-01-10 | End: 2023-01-15

## 2023-01-10 NOTE — PROGRESS NOTES
Name: Zander Gee      : 1980      MRN: [id number]  Encounter Provider: MICHAELLE Curry  Encounter Date: 1/10/2023   Encounter department: [address] [place] [address]  Acute non-recurrent frontal sinusitis  Assessment & Plan:  - Prescription sent for Augmentin  - Continue Flonase nasal spray  - Increase oral hydration and use humidifier   - Contact office if symptoms do not improve  Orders:  -     amoxicillin-clavulanate (AUGMENTIN) 875-125 mg per tablet; Take 1 tablet by mouth every 12 (twelve) hours for 10 days    2  Acute cough  Assessment & Plan:  - Prescription sent for prednisone 50 mg daily x 5 days  Advised of side effects   - Increase oral hydration and use humidifier   - Contact office if symptoms do not improve  Orders:  -     predniSONE 50 mg tablet; Take 1 tablet (50 mg total) by mouth daily for 5 days    3  Essential hypertension  Assessment & Plan:  - Well controlled on lisinoptil-hydrochlorothiazide daily  Continue same    - Will continue to monitor  Subjective     Patient presents today with complaints of cough, congestion, and right ear pressure  States she also had also of sinus pain/pressure and headache  Symptoms have been occurring for the past month  Has tried OTC Theraflu and Sudafed with no improvement  Denies any fever, chills, or SOB  Review of Systems   Constitutional: Negative for chills, fatigue and fever  HENT: Positive for congestion, ear pain (right ear pressure), sinus pressure and sinus pain  Negative for trouble swallowing  Eyes: Negative for visual disturbance  Respiratory: Positive for cough  Negative for shortness of breath  Cardiovascular: Negative for chest pain and palpitations  Gastrointestinal: Negative for abdominal pain and blood in stool  Endocrine: Negative for cold intolerance and heat intolerance  Genitourinary: Negative for difficulty urinating and dysuria  Musculoskeletal: Negative for gait problem  Skin: Negative for rash  Neurological: Positive for headaches  Negative for dizziness and syncope  Hematological: Negative for adenopathy  Psychiatric/Behavioral: Negative for behavioral problems  Past Medical History:   Diagnosis Date   • Depression    • Hypertension      Past Surgical History:   Procedure Laterality Date   • HERNIA REPAIR       Family History   Problem Relation Age of Onset   • Hypertension Mother    • Diabetes Mother         Mellitus   • Pancreatic cancer Father    • Diabetes Father         Mellitus   • Coronary artery disease Father    • Diabetes Family         Mellitus   • No Known Problems Sister    • No Known Problems Maternal Grandmother    • No Known Problems Maternal Grandfather    • Breast cancer Paternal Grandmother    • No Known Problems Paternal Grandfather    • No Known Problems Sister    • No Known Problems Sister    • No Known Problems Sister    • Cervical cancer Maternal Aunt    • Cancer Maternal Aunt         cervical   • Breast cancer Paternal Aunt 59     Social History     Socioeconomic History   • Marital status: Single     Spouse name: None   • Number of children: None   • Years of education: None   • Highest education level: None   Occupational History   • None   Tobacco Use   • Smoking status: Former     Types: Cigarettes     Quit date: 2005     Years since quittin 0   • Smokeless tobacco: Never   • Tobacco comments:     10 per day  No passive smoke exposure   Vaping Use   • Vaping Use: Never used   Substance and Sexual Activity   • Alcohol use:  Yes   • Drug use: No   • Sexual activity: None   Other Topics Concern   • None   Social History Narrative   • None     Social Determinants of Health     Financial Resource Strain: Not on file   Food Insecurity: Not on file   Transportation Needs: Not on file   Physical Activity: Not on file   Stress: Not on file   Social Connections: Not on file   Intimate Partner Violence: Not on file   Housing Stability: Not on file     Current Outpatient Medications on File Prior to Visit   Medication Sig   • albuterol (Ventolin HFA) 90 mcg/act inhaler Inhale 2 puffs every 6 (six) hours as needed for wheezing   • ergocalciferol (VITAMIN D2) 50,000 units TAKE 1 CAPSULE BY MOUTH ONE TIME PER WEEK   • FLUoxetine (PROzac) 40 MG capsule TAKE 1 CAPSULE (40 MG TOTAL) BY MOUTH EVERY DAY   • fluticasone (FLONASE) 50 mcg/act nasal spray Every 12 hours   • lisinopril-hydrochlorothiazide (PRINZIDE,ZESTORETIC) 20-25 MG per tablet TAKE 1 TABLET BY MOUTH EVERY DAY   • neomycin-polymyxin-hydrocortisone (CORTISPORIN) 0 35%-10,000 units/mL-1% otic suspension Administer 4 drops into both ears 4 (four) times a day   • rosuvastatin (CRESTOR) 5 mg tablet Take 1 tablet (5 mg total) by mouth daily   • meloxicam (Mobic) 15 mg tablet Take 1 tablet (15 mg total) by mouth daily (Patient not taking: Reported on 1/10/2023)   • methocarbamol (ROBAXIN) 500 mg tablet Take 1 tablet (500 mg total) by mouth daily at bedtime as needed for muscle spasms (Patient not taking: Reported on 1/10/2023)     Allergies   Allergen Reactions   • Shellfish Allergy - Food Allergy      Immunization History   Administered Date(s) Administered   • COVID-19 PFIZER VACCINE 0 3 ML IM 08/02/2021, 08/23/2021, 02/22/2022   • Fluzone Split Quad 0 25 mL 10/05/2017   • INFLUENZA 10/05/2017       Objective     /64 (BP Location: Left arm, Patient Position: Sitting, Cuff Size: Large)   Pulse 88   Temp 97 9 °F (36 6 °C) (Tympanic)   Ht 5' 5" (1 651 m)   Wt (!) 165 kg (363 lb)   SpO2 99%   BMI 60 41 kg/m²     Physical Exam  Vitals and nursing note reviewed  Constitutional:       Appearance: Normal appearance  HENT:      Head: Normocephalic and atraumatic  Right Ear: Ear canal and external ear normal  Swelling present  Left Ear: Tympanic membrane, ear canal and external ear normal       Nose: Congestion present        Right Sinus: Maxillary sinus tenderness and frontal sinus tenderness present  Left Sinus: Maxillary sinus tenderness and frontal sinus tenderness present  Eyes:      Conjunctiva/sclera: Conjunctivae normal    Cardiovascular:      Rate and Rhythm: Normal rate and regular rhythm  Heart sounds: Normal heart sounds  Pulmonary:      Effort: Pulmonary effort is normal       Breath sounds: Normal breath sounds  Abdominal:      General: Bowel sounds are normal    Musculoskeletal:         General: Normal range of motion  Cervical back: Normal range of motion  Skin:     General: Skin is warm and dry  Neurological:      Mental Status: She is alert and oriented to person, place, and time  Cranial Nerves: No cranial nerve deficit     Psychiatric:         Mood and Affect: Mood normal          Behavior: Behavior normal        MICHAELLE Simpson

## 2023-01-10 NOTE — ASSESSMENT & PLAN NOTE
- Prescription sent for prednisone 50 mg daily x 5 days  Advised of side effects   - Increase oral hydration and use humidifier   - Contact office if symptoms do not improve

## 2023-02-07 ENCOUNTER — OFFICE VISIT (OUTPATIENT)
Dept: FAMILY MEDICINE CLINIC | Facility: CLINIC | Age: 43
End: 2023-02-07

## 2023-02-07 VITALS
BODY MASS INDEX: 48.82 KG/M2 | SYSTOLIC BLOOD PRESSURE: 130 MMHG | OXYGEN SATURATION: 96 % | WEIGHT: 293 LBS | HEART RATE: 79 BPM | TEMPERATURE: 98.5 F | DIASTOLIC BLOOD PRESSURE: 78 MMHG | HEIGHT: 65 IN

## 2023-02-07 DIAGNOSIS — I10 ESSENTIAL HYPERTENSION: ICD-10-CM

## 2023-02-07 DIAGNOSIS — Z12.31 ENCOUNTER FOR SCREENING MAMMOGRAM FOR BREAST CANCER: ICD-10-CM

## 2023-02-07 DIAGNOSIS — J01.01 ACUTE RECURRENT MAXILLARY SINUSITIS: Primary | ICD-10-CM

## 2023-02-07 RX ORDER — PREDNISONE 50 MG/1
50 TABLET ORAL DAILY
Qty: 5 TABLET | Refills: 0 | Status: SHIPPED | OUTPATIENT
Start: 2023-02-07 | End: 2023-02-12

## 2023-02-07 RX ORDER — AZITHROMYCIN 250 MG/1
TABLET, FILM COATED ORAL
Qty: 6 TABLET | Refills: 0 | Status: SHIPPED | OUTPATIENT
Start: 2023-02-07 | End: 2023-02-12

## 2023-02-07 NOTE — ASSESSMENT & PLAN NOTE
- Well controlled on lisinopril-hydrochlorothiazide daily  Continue same    - Will continue to monitor

## 2023-02-07 NOTE — PROGRESS NOTES
Name: Sabra Torres      : 1980      MRN: [id number]  Encounter Provider: MICHAELLE Wolf  Encounter Date: 2023   Encounter department: [address] [place] [address]  Acute recurrent maxillary sinusitis  Assessment & Plan:  - Prescriptions sent for z-wesly and prednisone  Advised of side effects   - Continue Flonase nasal spray  - Increase oral hydration and use humidifier   - If no improvement consider referral to ENT for recurrent sinusitis  Orders:  -     azithromycin (Zithromax) 250 mg tablet; Take 2 tablets (500 mg total) by mouth daily for 1 day, THEN 1 tablet (250 mg total) daily for 4 days  -     predniSONE 50 mg tablet; Take 1 tablet (50 mg total) by mouth daily for 5 days    2  Encounter for screening mammogram for breast cancer  -     Mammo screening bilateral w 3d & cad; Future; Expected date: 2023    3  Essential hypertension  Assessment & Plan:  - Well controlled on lisinopril-hydrochlorothiazide daily  Continue same    - Will continue to monitor  Subjective     Patient presents today with complaints of sinus pain and pressure, congestion, headache, slight cough, and right sided ear pain for the past 7 days  States her eyes actually ache from the sinus pressure she has  Endorses eye watering  Also has pressure in head when bending over  Has taken OTC Sudafed with little improvement  Earache   There is pain in the right ear  This is a recurrent problem  The current episode started in the past 7 days  The problem occurs every few hours  The problem has been unchanged  There has been no fever  The fever has been present for less than 1 day  The pain is at a severity of 4/10  Associated symptoms include coughing, headaches, rhinorrhea and a sore throat  Pertinent negatives include no abdominal pain, diarrhea, ear discharge, hearing loss, neck pain, rash or vomiting       Review of Systems   Constitutional: Negative for chills and fever  HENT: Positive for congestion, ear pain, rhinorrhea, sinus pressure, sinus pain and sore throat  Negative for ear discharge and hearing loss  Eyes: Negative for visual disturbance  Respiratory: Positive for cough  Cardiovascular: Negative for chest pain and palpitations  Gastrointestinal: Negative for abdominal pain, diarrhea and vomiting  Genitourinary: Negative for difficulty urinating  Musculoskeletal: Negative for neck pain  Skin: Negative for rash  Neurological: Positive for headaches  Hematological: Negative for adenopathy  Psychiatric/Behavioral: Negative for behavioral problems  Past Medical History:   Diagnosis Date   • Depression    • Hypertension      Past Surgical History:   Procedure Laterality Date   • HERNIA REPAIR       Family History   Problem Relation Age of Onset   • Hypertension Mother    • Diabetes Mother         Mellitus   • Pancreatic cancer Father    • Diabetes Father         Mellitus   • Coronary artery disease Father    • Diabetes Family         Mellitus   • No Known Problems Sister    • No Known Problems Maternal Grandmother    • No Known Problems Maternal Grandfather    • Breast cancer Paternal Grandmother    • No Known Problems Paternal Grandfather    • No Known Problems Sister    • No Known Problems Sister    • No Known Problems Sister    • Cervical cancer Maternal Aunt    • Cancer Maternal Aunt         cervical   • Breast cancer Paternal Aunt 59     Social History     Socioeconomic History   • Marital status: Single     Spouse name: None   • Number of children: None   • Years of education: None   • Highest education level: None   Occupational History   • None   Tobacco Use   • Smoking status: Former     Types: Cigarettes     Quit date: 2005     Years since quittin 1   • Smokeless tobacco: Never   • Tobacco comments:     10 per day   No passive smoke exposure   Vaping Use   • Vaping Use: Never used   Substance and Sexual Activity   • Alcohol use:  Yes   • Drug use: No   • Sexual activity: None   Other Topics Concern   • None   Social History Narrative   • None     Social Determinants of Health     Financial Resource Strain: Not on file   Food Insecurity: Not on file   Transportation Needs: Not on file   Physical Activity: Not on file   Stress: Not on file   Social Connections: Not on file   Intimate Partner Violence: Not on file   Housing Stability: Not on file     Current Outpatient Medications on File Prior to Visit   Medication Sig   • albuterol (Ventolin HFA) 90 mcg/act inhaler Inhale 2 puffs every 6 (six) hours as needed for wheezing   • ergocalciferol (VITAMIN D2) 50,000 units TAKE 1 CAPSULE BY MOUTH ONE TIME PER WEEK   • FLUoxetine (PROzac) 40 MG capsule TAKE 1 CAPSULE (40 MG TOTAL) BY MOUTH EVERY DAY   • fluticasone (FLONASE) 50 mcg/act nasal spray Every 12 hours   • lisinopril-hydrochlorothiazide (PRINZIDE,ZESTORETIC) 20-25 MG per tablet TAKE 1 TABLET BY MOUTH EVERY DAY   • neomycin-polymyxin-hydrocortisone (CORTISPORIN) 0 35%-10,000 units/mL-1% otic suspension Administer 4 drops into both ears 4 (four) times a day   • rosuvastatin (CRESTOR) 5 mg tablet Take 1 tablet (5 mg total) by mouth daily   • meloxicam (Mobic) 15 mg tablet Take 1 tablet (15 mg total) by mouth daily (Patient not taking: Reported on 1/10/2023)   • methocarbamol (ROBAXIN) 500 mg tablet Take 1 tablet (500 mg total) by mouth daily at bedtime as needed for muscle spasms (Patient not taking: Reported on 1/10/2023)     Allergies   Allergen Reactions   • Shellfish Allergy - Food Allergy      Immunization History   Administered Date(s) Administered   • COVID-19 PFIZER VACCINE 0 3 ML IM 08/02/2021, 08/23/2021, 02/22/2022   • Fluzone Split Quad 0 25 mL 10/05/2017   • INFLUENZA 10/05/2017       Objective     /78 (BP Location: Left arm, Patient Position: Sitting, Cuff Size: Large)   Pulse 79   Temp 98 5 °F (36 9 °C) (Tympanic)   Ht 5' 5" (1 651 m)   Wt (!) 166 kg (365 lb 6 4 oz)   SpO2 96%   BMI 60 81 kg/m²     Physical Exam  Vitals and nursing note reviewed  Constitutional:       General: She is not in acute distress  Appearance: Normal appearance  HENT:      Head: Normocephalic and atraumatic  Right Ear: Tympanic membrane, ear canal and external ear normal       Left Ear: Tympanic membrane, ear canal and external ear normal       Nose: Congestion present  Right Sinus: Maxillary sinus tenderness and frontal sinus tenderness present  Left Sinus: Maxillary sinus tenderness and frontal sinus tenderness present  Eyes:      Conjunctiva/sclera: Conjunctivae normal    Cardiovascular:      Rate and Rhythm: Normal rate and regular rhythm  Heart sounds: Normal heart sounds  Pulmonary:      Effort: Pulmonary effort is normal       Breath sounds: Normal breath sounds  Musculoskeletal:         General: Normal range of motion  Cervical back: Normal range of motion  Skin:     General: Skin is warm and dry  Neurological:      Mental Status: She is alert and oriented to person, place, and time  Cranial Nerves: No cranial nerve deficit     Psychiatric:         Mood and Affect: Mood normal          Behavior: Behavior normal        MICHAELLE Berrios

## 2023-02-07 NOTE — ASSESSMENT & PLAN NOTE
- Prescriptions sent for z-wesly and prednisone  Advised of side effects   - Continue Flonase nasal spray  - Increase oral hydration and use humidifier   - If no improvement consider referral to ENT for recurrent sinusitis

## 2023-02-15 ENCOUNTER — RA CDI HCC (OUTPATIENT)
Dept: OTHER | Facility: HOSPITAL | Age: 43
End: 2023-02-15

## 2023-02-23 ENCOUNTER — HOSPITAL ENCOUNTER (OUTPATIENT)
Dept: RADIOLOGY | Facility: IMAGING CENTER | Age: 43
End: 2023-02-23

## 2023-02-23 VITALS — BODY MASS INDEX: 48.82 KG/M2 | WEIGHT: 293 LBS | HEIGHT: 65 IN

## 2023-02-23 DIAGNOSIS — Z12.31 ENCOUNTER FOR SCREENING MAMMOGRAM FOR BREAST CANCER: ICD-10-CM

## 2023-02-27 DIAGNOSIS — F32.A DEPRESSION, UNSPECIFIED DEPRESSION TYPE: ICD-10-CM

## 2023-02-27 RX ORDER — FLUOXETINE HYDROCHLORIDE 40 MG/1
CAPSULE ORAL
Qty: 90 CAPSULE | Refills: 1 | Status: SHIPPED | OUTPATIENT
Start: 2023-02-27

## 2023-02-28 ENCOUNTER — TELEPHONE (OUTPATIENT)
Dept: FAMILY MEDICINE CLINIC | Facility: CLINIC | Age: 43
End: 2023-02-28

## 2023-02-28 NOTE — TELEPHONE ENCOUNTER
----- Message from [address] sent at 2/28/2023  9:55 AM EST -----  Mammogram showed no malignancy  Recommend routine screening mammogram next year

## 2023-03-11 PROBLEM — J01.10 ACUTE NON-RECURRENT FRONTAL SINUSITIS: Status: RESOLVED | Noted: 2023-01-10 | Resolved: 2023-03-11

## 2023-03-11 PROBLEM — R05.1 ACUTE COUGH: Status: RESOLVED | Noted: 2023-01-10 | Resolved: 2023-03-11

## 2023-03-20 DIAGNOSIS — I10 ESSENTIAL HYPERTENSION: ICD-10-CM

## 2023-03-20 RX ORDER — LISINOPRIL AND HYDROCHLOROTHIAZIDE 25; 20 MG/1; MG/1
1 TABLET ORAL DAILY
Qty: 90 TABLET | Refills: 1 | Status: SHIPPED | OUTPATIENT
Start: 2023-03-20

## 2023-03-22 DIAGNOSIS — E78.49 OTHER HYPERLIPIDEMIA: ICD-10-CM

## 2023-03-22 RX ORDER — ROSUVASTATIN CALCIUM 5 MG/1
5 TABLET, COATED ORAL DAILY
Qty: 90 TABLET | Refills: 1 | Status: SHIPPED | OUTPATIENT
Start: 2023-03-22

## 2023-04-08 PROBLEM — J01.01 ACUTE RECURRENT MAXILLARY SINUSITIS: Status: RESOLVED | Noted: 2023-02-07 | Resolved: 2023-04-08

## 2023-05-31 ENCOUNTER — OFFICE VISIT (OUTPATIENT)
Dept: FAMILY MEDICINE CLINIC | Facility: CLINIC | Age: 43
End: 2023-05-31

## 2023-05-31 VITALS
BODY MASS INDEX: 48.82 KG/M2 | HEIGHT: 65 IN | DIASTOLIC BLOOD PRESSURE: 76 MMHG | HEART RATE: 80 BPM | OXYGEN SATURATION: 97 % | TEMPERATURE: 98.7 F | SYSTOLIC BLOOD PRESSURE: 124 MMHG | RESPIRATION RATE: 16 BRPM | WEIGHT: 293 LBS

## 2023-05-31 DIAGNOSIS — M25.561 ACUTE PAIN OF RIGHT KNEE: ICD-10-CM

## 2023-05-31 DIAGNOSIS — E78.49 OTHER HYPERLIPIDEMIA: ICD-10-CM

## 2023-05-31 DIAGNOSIS — F33.0 MILD EPISODE OF RECURRENT MAJOR DEPRESSIVE DISORDER (HCC): Primary | ICD-10-CM

## 2023-05-31 DIAGNOSIS — I10 ESSENTIAL HYPERTENSION: ICD-10-CM

## 2023-05-31 DIAGNOSIS — E55.9 VITAMIN D DEFICIENCY: ICD-10-CM

## 2023-05-31 DIAGNOSIS — M25.551 RIGHT HIP PAIN: ICD-10-CM

## 2023-05-31 RX ORDER — MELOXICAM 15 MG/1
15 TABLET ORAL DAILY PRN
Qty: 30 TABLET | Refills: 1 | Status: SHIPPED | OUTPATIENT
Start: 2023-05-31

## 2023-05-31 NOTE — ASSESSMENT & PLAN NOTE
- Will obtain x-ray of right hip  - Prescription sent for meloxicam  Advised of side effects  Take with food  - Consider referral to PT and/or Ortho  - Will continue to follow up

## 2023-05-31 NOTE — ASSESSMENT & PLAN NOTE
- Will obtain x-ray of right knee  - Prescription sent for meloxicam  Advised of side effects  Take with food  - Consider referral to PT and/or Ortho  - Will continue to follow up

## 2023-05-31 NOTE — PROGRESS NOTES
Name: Teagan Villar      : 1980      MRN: [id number]  Encounter Provider: MICHAELLE Mckeon  Encounter Date: 2023   Encounter department: [address] [place] [address]  Mild episode of recurrent major depressive disorder ([facility] [address] )  Assessment & Plan:  - Well controlled on Prozac 40 mg daily  Continue same    - Will continue to monitor  2  Right hip pain  Assessment & Plan:  - Will obtain x-ray of right hip  - Prescription sent for meloxicam  Advised of side effects  Take with food  - Consider referral to PT and/or Ortho  - Will continue to follow up  Orders:  -     meloxicam (MOBIC) 15 mg tablet; Take 1 tablet (15 mg total) by mouth daily as needed for moderate pain  -     XR hip/pelv 2-3 vws right if performed; Future; Expected date: 2023    3  Acute pain of right knee  Assessment & Plan:  - Will obtain x-ray of right knee  - Prescription sent for meloxicam  Advised of side effects  Take with food  - Consider referral to PT and/or Ortho  - Will continue to follow up  Orders:  -     meloxicam (MOBIC) 15 mg tablet; Take 1 tablet (15 mg total) by mouth daily as needed for moderate pain  -     XR knee 4+ vw right injury; Future; Expected date: 2023    4  Other hyperlipidemia  Assessment & Plan:  - Continue Crestor 5 mg daily  - Will obtain updated fasting lipid panel  Orders:  -     Lipid Panel with Direct LDL reflex; Future    5  Essential hypertension  Assessment & Plan:  - Well controlled on lisinopril-hydrochlorothiazide daily  Continue same    - Will continue to monitor  Orders:  -     CBC and differential; Future  -     Comprehensive metabolic panel; Future  -     Lipid Panel with Direct LDL reflex; Future  -     TSH, 3rd generation with Free T4 reflex; Future    6  Vitamin D deficiency  -     Vitamin D 25 hydroxy;  Future           Subjective     Patient with PMH of HTN, hyperlipidemia, and depression presents today for routine follow up  She is taking her prescribed medication and reports no side effects  She is having complaints today of right knee and hip pain for the past few weeks  Pain is aching in nature and seems to be worsening  Is now causing her to limp  She feels a clicking in her knee after walking and sometimes has decreased ROM and stiffness of the knee  She has tried no over the counter pain reliever  She did try topical CBD oil with no improvement  She denies any injury or trauma  Does have family history of osteoarthritis  Hypertension  This is a chronic problem  The current episode started more than 1 year ago  The problem has been resolved since onset  The problem is controlled  Associated symptoms include anxiety and malaise/fatigue  Pertinent negatives include no blurred vision, chest pain, headaches, neck pain, orthopnea, palpitations, peripheral edema, PND, shortness of breath or sweats  Agents associated with hypertension include NSAIDs  Risk factors for coronary artery disease include dyslipidemia, family history, obesity, sedentary lifestyle and stress  Compliance problems include diet, exercise and psychosocial issues  Review of Systems   Constitutional: Positive for malaise/fatigue  Negative for fatigue and fever  HENT: Negative for trouble swallowing  Eyes: Negative for blurred vision and visual disturbance  Respiratory: Negative for cough and shortness of breath  Cardiovascular: Negative for chest pain, palpitations, orthopnea and PND  Gastrointestinal: Negative for abdominal pain and blood in stool  Endocrine: Negative for cold intolerance and heat intolerance  Genitourinary: Negative for difficulty urinating and dysuria  Musculoskeletal: Positive for arthralgias (right knee and hip)  Negative for gait problem and neck pain  Skin: Negative for rash  Neurological: Negative for dizziness, syncope and headaches  Hematological: Negative for adenopathy  Psychiatric/Behavioral: Negative for behavioral problems  Past Medical History:   Diagnosis Date   • Depression    • Hypertension      Past Surgical History:   Procedure Laterality Date   • HERNIA REPAIR       Family History   Problem Relation Age of Onset   • Hypertension Mother    • Diabetes Mother         Mellitus   • Pancreatic cancer Father    • Diabetes Father         Mellitus   • Coronary artery disease Father    • No Known Problems Sister    • No Known Problems Sister    • No Known Problems Sister    • No Known Problems Sister    • No Known Problems Maternal Grandmother    • No Known Problems Maternal Grandfather    • Breast cancer Paternal Grandmother    • No Known Problems Paternal Grandfather    • Cervical cancer Maternal Aunt    • Cancer Maternal Aunt         cervical   • Breast cancer Paternal Aunt 59   • Diabetes Family         Mellitus   • No Known Problems Brother    • No Known Problems Brother    • No Known Problems Brother      Social History     Socioeconomic History   • Marital status: Single     Spouse name: None   • Number of children: None   • Years of education: None   • Highest education level: None   Occupational History   • None   Tobacco Use   • Smoking status: Former     Types: Cigarettes     Quit date: 2005     Years since quittin 4   • Smokeless tobacco: Never   • Tobacco comments:     10 per day  No passive smoke exposure   Vaping Use   • Vaping Use: Never used   Substance and Sexual Activity   • Alcohol use:  Yes   • Drug use: No   • Sexual activity: None   Other Topics Concern   • None   Social History Narrative   • None     Social Determinants of Health     Financial Resource Strain: Not on file   Food Insecurity: Not on file   Transportation Needs: Not on file   Physical Activity: Not on file   Stress: Not on file   Social Connections: Not on file   Intimate Partner Violence: Not on file   Housing Stability: Not on file     Current Outpatient Medications on "File Prior to Visit   Medication Sig   • albuterol (Ventolin HFA) 90 mcg/act inhaler Inhale 2 puffs every 6 (six) hours as needed for wheezing   • ergocalciferol (VITAMIN D2) 50,000 units TAKE 1 CAPSULE BY MOUTH ONE TIME PER WEEK   • FLUoxetine (PROzac) 40 MG capsule TAKE 1 CAPSULE BY MOUTH EVERY DAY   • fluticasone (FLONASE) 50 mcg/act nasal spray Every 12 hours   • lisinopril-hydrochlorothiazide (PRINZIDE,ZESTORETIC) 20-25 MG per tablet Take 1 tablet by mouth daily   • neomycin-polymyxin-hydrocortisone (CORTISPORIN) 0 35%-10,000 units/mL-1% otic suspension Administer 4 drops into both ears 4 (four) times a day   • rosuvastatin (CRESTOR) 5 mg tablet TAKE 1 TABLET (5 MG TOTAL) BY MOUTH DAILY  • [DISCONTINUED] meloxicam (Mobic) 15 mg tablet Take 1 tablet (15 mg total) by mouth daily (Patient not taking: Reported on 1/10/2023)   • [DISCONTINUED] methocarbamol (ROBAXIN) 500 mg tablet Take 1 tablet (500 mg total) by mouth daily at bedtime as needed for muscle spasms (Patient not taking: Reported on 1/10/2023)     Allergies   Allergen Reactions   • Shellfish Allergy - Food Allergy      Immunization History   Administered Date(s) Administered   • COVID-19 PFIZER VACCINE 0 3 ML IM 08/02/2021, 08/23/2021, 02/22/2022   • Fluzone Split Quad 0 25 mL 10/05/2017   • INFLUENZA 10/05/2017       Objective     /76 (BP Location: Left arm, Patient Position: Sitting, Cuff Size: Large)   Pulse 80   Temp 98 7 °F (37 1 °C) (Tympanic)   Resp 16   Ht 5' 5\" (1 651 m)   Wt (!) 161 kg (355 lb 12 8 oz)   SpO2 97%   BMI 59 21 kg/m²     Physical Exam  Vitals and nursing note reviewed  Constitutional:       General: She is not in acute distress  Appearance: Normal appearance  She is not ill-appearing  HENT:      Head: Normocephalic and atraumatic        Right Ear: External ear normal       Left Ear: External ear normal       Nose: Nose normal    Eyes:      Conjunctiva/sclera: Conjunctivae normal    Cardiovascular:      Rate " and Rhythm: Normal rate and regular rhythm  Heart sounds: Normal heart sounds  Pulmonary:      Effort: Pulmonary effort is normal       Breath sounds: Normal breath sounds  Musculoskeletal:         General: Normal range of motion  Cervical back: Normal range of motion  Skin:     General: Skin is warm and dry  Neurological:      Mental Status: She is alert and oriented to person, place, and time  Cranial Nerves: No cranial nerve deficit     Psychiatric:         Mood and Affect: Mood normal          Behavior: Behavior normal        MICHAELLE Deleon

## 2023-06-01 ENCOUNTER — HOSPITAL ENCOUNTER (OUTPATIENT)
Dept: RADIOLOGY | Facility: IMAGING CENTER | Age: 43
End: 2023-06-01
Payer: COMMERCIAL

## 2023-06-01 ENCOUNTER — APPOINTMENT (OUTPATIENT)
Dept: LAB | Facility: IMAGING CENTER | Age: 43
End: 2023-06-01
Payer: COMMERCIAL

## 2023-06-01 DIAGNOSIS — M25.551 RIGHT HIP PAIN: ICD-10-CM

## 2023-06-01 DIAGNOSIS — I10 ESSENTIAL HYPERTENSION: ICD-10-CM

## 2023-06-01 DIAGNOSIS — M25.561 ACUTE PAIN OF RIGHT KNEE: ICD-10-CM

## 2023-06-01 DIAGNOSIS — E55.9 VITAMIN D DEFICIENCY: ICD-10-CM

## 2023-06-01 DIAGNOSIS — R73.9 HYPERGLYCEMIA: Primary | ICD-10-CM

## 2023-06-01 DIAGNOSIS — E78.49 OTHER HYPERLIPIDEMIA: ICD-10-CM

## 2023-06-01 LAB
25(OH)D3 SERPL-MCNC: 29.6 NG/ML (ref 30–100)
ALBUMIN SERPL BCP-MCNC: 3.6 G/DL (ref 3.5–5)
ALP SERPL-CCNC: 46 U/L (ref 46–116)
ALT SERPL W P-5'-P-CCNC: 24 U/L (ref 12–78)
ANION GAP SERPL CALCULATED.3IONS-SCNC: 4 MMOL/L (ref 4–13)
AST SERPL W P-5'-P-CCNC: 22 U/L (ref 5–45)
BASOPHILS # BLD AUTO: 0.07 THOUSANDS/ÂΜL (ref 0–0.1)
BASOPHILS NFR BLD AUTO: 1 % (ref 0–1)
BILIRUB SERPL-MCNC: 0.48 MG/DL (ref 0.2–1)
BUN SERPL-MCNC: 18 MG/DL (ref 5–25)
CALCIUM SERPL-MCNC: 9.4 MG/DL (ref 8.3–10.1)
CHLORIDE SERPL-SCNC: 104 MMOL/L (ref 96–108)
CHOLEST SERPL-MCNC: 130 MG/DL
CO2 SERPL-SCNC: 26 MMOL/L (ref 21–32)
CREAT SERPL-MCNC: 0.98 MG/DL (ref 0.6–1.3)
EOSINOPHIL # BLD AUTO: 0.12 THOUSAND/ÂΜL (ref 0–0.61)
EOSINOPHIL NFR BLD AUTO: 2 % (ref 0–6)
ERYTHROCYTE [DISTWIDTH] IN BLOOD BY AUTOMATED COUNT: 12.7 % (ref 11.6–15.1)
GFR SERPL CREATININE-BSD FRML MDRD: 70 ML/MIN/1.73SQ M
GLUCOSE P FAST SERPL-MCNC: 115 MG/DL (ref 65–99)
HCT VFR BLD AUTO: 39.9 % (ref 34.8–46.1)
HDLC SERPL-MCNC: 51 MG/DL
HGB BLD-MCNC: 13.3 G/DL (ref 11.5–15.4)
IMM GRANULOCYTES # BLD AUTO: 0.02 THOUSAND/UL (ref 0–0.2)
IMM GRANULOCYTES NFR BLD AUTO: 0 % (ref 0–2)
LDLC SERPL CALC-MCNC: 49 MG/DL (ref 0–100)
LYMPHOCYTES # BLD AUTO: 2.1 THOUSANDS/ÂΜL (ref 0.6–4.47)
LYMPHOCYTES NFR BLD AUTO: 29 % (ref 14–44)
MCH RBC QN AUTO: 29.2 PG (ref 26.8–34.3)
MCHC RBC AUTO-ENTMCNC: 33.3 G/DL (ref 31.4–37.4)
MCV RBC AUTO: 88 FL (ref 82–98)
MONOCYTES # BLD AUTO: 0.49 THOUSAND/ÂΜL (ref 0.17–1.22)
MONOCYTES NFR BLD AUTO: 7 % (ref 4–12)
NEUTROPHILS # BLD AUTO: 4.51 THOUSANDS/ÂΜL (ref 1.85–7.62)
NEUTS SEG NFR BLD AUTO: 61 % (ref 43–75)
NRBC BLD AUTO-RTO: 0 /100 WBCS
PLATELET # BLD AUTO: 366 THOUSANDS/UL (ref 149–390)
PMV BLD AUTO: 9.9 FL (ref 8.9–12.7)
POTASSIUM SERPL-SCNC: 4.1 MMOL/L (ref 3.5–5.3)
PROT SERPL-MCNC: 7.2 G/DL (ref 6.4–8.4)
RBC # BLD AUTO: 4.56 MILLION/UL (ref 3.81–5.12)
SODIUM SERPL-SCNC: 134 MMOL/L (ref 135–147)
TRIGL SERPL-MCNC: 148 MG/DL
TSH SERPL DL<=0.05 MIU/L-ACNC: 1.46 UIU/ML (ref 0.45–4.5)
WBC # BLD AUTO: 7.31 THOUSAND/UL (ref 4.31–10.16)

## 2023-06-01 PROCEDURE — 80061 LIPID PANEL: CPT

## 2023-06-01 PROCEDURE — 85025 COMPLETE CBC W/AUTO DIFF WBC: CPT

## 2023-06-01 PROCEDURE — 84443 ASSAY THYROID STIM HORMONE: CPT

## 2023-06-01 PROCEDURE — 82306 VITAMIN D 25 HYDROXY: CPT

## 2023-06-01 PROCEDURE — 73564 X-RAY EXAM KNEE 4 OR MORE: CPT

## 2023-06-01 PROCEDURE — 73502 X-RAY EXAM HIP UNI 2-3 VIEWS: CPT

## 2023-06-01 PROCEDURE — 36415 COLL VENOUS BLD VENIPUNCTURE: CPT

## 2023-06-01 PROCEDURE — 80053 COMPREHEN METABOLIC PANEL: CPT

## 2023-06-02 ENCOUNTER — TELEPHONE (OUTPATIENT)
Dept: FAMILY MEDICINE CLINIC | Facility: CLINIC | Age: 43
End: 2023-06-02

## 2023-06-02 NOTE — TELEPHONE ENCOUNTER
----- Message from Burna Soulier sent at 6/1/2023  2:32 PM EDT -----  Labs show low vitamin D  Continue taking weekly vitamin D supplement  Her fasting glucose is elevated  Would like to check hemoglobin A1c before her next appointment  I put orders in computer  The rest of her labs are stable

## 2023-06-02 NOTE — TELEPHONE ENCOUNTER
----- Message from [address] sent at 6/1/2023  2:32 PM EDT -----  Labs show low vitamin D  Continue taking weekly vitamin D supplement  Her fasting glucose is elevated  Would like to check hemoglobin A1c before her next appointment  I put orders in computer  The rest of her labs are stable

## 2023-06-06 ENCOUNTER — TELEPHONE (OUTPATIENT)
Dept: FAMILY MEDICINE CLINIC | Facility: CLINIC | Age: 43
End: 2023-06-06

## 2023-06-06 NOTE — TELEPHONE ENCOUNTER
----- Message from [address] sent at 6/6/2023  9:32 AM EDT -----  Xrays were for the most part unremarkable except for mild osteoarthritis in the right knee  Continue meloxicam as prescribed  If no improvement would recommend referral to Ortho

## 2023-06-07 DIAGNOSIS — E55.9 VITAMIN D DEFICIENCY: ICD-10-CM

## 2023-06-08 RX ORDER — ERGOCALCIFEROL 1.25 MG/1
CAPSULE ORAL
Qty: 12 CAPSULE | Refills: 1 | Status: SHIPPED | OUTPATIENT
Start: 2023-06-08

## 2023-06-13 ENCOUNTER — TELEPHONE (OUTPATIENT)
Dept: FAMILY MEDICINE CLINIC | Facility: CLINIC | Age: 43
End: 2023-06-13

## 2023-06-13 DIAGNOSIS — M25.561 ACUTE PAIN OF RIGHT KNEE: Primary | ICD-10-CM

## 2023-06-13 NOTE — TELEPHONE ENCOUNTER
Please see below messages  Pt is still having pain in her right knee and is currently taking meloxicam with little relief  She would like to know what the next step is?    Please advise

## 2023-06-13 NOTE — TELEPHONE ENCOUNTER
----- Message from Elly High sent at 6/13/2023  1:30 PM EDT -----  Regarding: Arthritis   Contact: [phone]  Adriana Phillips,   I am still having severe pain in my right knee and it has been difficult to walk, what are the next steps I can take to make this better  It’s getting frustrating because it feels like the meloxicam is not helping but I don’t know how long it takes to work  Any relief would be excellent!      Thanks for all your help,   Elly High

## 2023-06-13 NOTE — TELEPHONE ENCOUNTER
Hi, this is Maxwell Limon  My birthday is March 19th, 1980 and a number of you reached out is [phone]  I am calling because I was in  Like a little over a week ago about my knee, we determined I have osteoarthritis and taking meloxicam and my knee is not feeling any better  It actually hurts really bad  I don't know if there is anything else that I could be doing, but I definitely want to do something about it because I'm trying to work and it's just been really hard getting around  So if somebody could give me a call back, I would really appreciate it  The number again is [phone]  I look forward to hearing back from you   Bye

## 2023-06-19 ENCOUNTER — CONSULT (OUTPATIENT)
Dept: OBGYN CLINIC | Facility: CLINIC | Age: 43
End: 2023-06-19
Payer: COMMERCIAL

## 2023-06-19 VITALS
WEIGHT: 293 LBS | DIASTOLIC BLOOD PRESSURE: 70 MMHG | HEIGHT: 65 IN | BODY MASS INDEX: 48.82 KG/M2 | SYSTOLIC BLOOD PRESSURE: 120 MMHG

## 2023-06-19 DIAGNOSIS — M17.11 PRIMARY OSTEOARTHRITIS OF RIGHT KNEE: ICD-10-CM

## 2023-06-19 DIAGNOSIS — M70.61 GREATER TROCHANTERIC BURSITIS OF RIGHT HIP: Primary | ICD-10-CM

## 2023-06-19 PROCEDURE — 99203 OFFICE O/P NEW LOW 30 MIN: CPT | Performed by: PHYSICIAN ASSISTANT

## 2023-06-19 PROCEDURE — 20610 DRAIN/INJ JOINT/BURSA W/O US: CPT | Performed by: PHYSICIAN ASSISTANT

## 2023-06-19 RX ORDER — LIDOCAINE HYDROCHLORIDE 10 MG/ML
2 INJECTION, SOLUTION INFILTRATION; PERINEURAL
Status: COMPLETED | OUTPATIENT
Start: 2023-06-19 | End: 2023-06-19

## 2023-06-19 RX ORDER — METHYLPREDNISOLONE ACETATE 40 MG/ML
1 INJECTION, SUSPENSION INTRA-ARTICULAR; INTRALESIONAL; INTRAMUSCULAR; SOFT TISSUE
Status: COMPLETED | OUTPATIENT
Start: 2023-06-19 | End: 2023-06-19

## 2023-06-19 RX ADMIN — LIDOCAINE HYDROCHLORIDE 2 ML: 10 INJECTION, SOLUTION INFILTRATION; PERINEURAL at 09:30

## 2023-06-19 RX ADMIN — METHYLPREDNISOLONE ACETATE 1 ML: 40 INJECTION, SUSPENSION INTRA-ARTICULAR; INTRALESIONAL; INTRAMUSCULAR; SOFT TISSUE at 09:30

## 2023-06-19 NOTE — PROGRESS NOTES
"Patient Name:  Ivette Jimenez  MRN:  [id number]    Assessment & Plan     Right hip greater trochanteric bursitis  Right knee DJD  1  Corticosteroid injection performed today into the right hip greater trochanteric bursa  2  Offered intra-articular corticosteroid injection into the right knee  Patient declined at this time  3  Referral to physical therapy  4  Continue meloxicam   5  Activities as tolerated with modification avoid pain  6  Follow-up in 6 weeks with primary care sports medicine  Chief Complaint     Right knee pain, right hip pain  History of the Present Illness     Ivette Jimenez is a 37 y o  female who reports to the office today for evaluation of her right knee  She notes an onset of pain approximately 1 month ago  She denies any specific injury or trauma  She states the pain began after playing with her nieces  She notes pain localized primarily to the lateral aspect of the right hip with radiation distally along the lateral thigh to the knee  Pain is worse with direct pressure and lying on her right side  She denies any radiation into the groin  She denies any weakness or instability in the hip  She also notes generalized right knee pain  She denies any significant knee swelling or stiffness  Pain is worse with prolonged weightbearing activities and progresses throughout the day  She has been taking meloxicam with some improvement  No weakness or instability in the knee  No numbness or tingling  No fevers or chills  Physical Exam     /70   Ht 5' 5\" (1 651 m)   Wt (!) 161 kg (356 lb)   BMI 59 24 kg/m²     Right knee: No gross deformity  Skin intact  No erythema ecchymosis or swelling  No effusion  No joint line tenderness  Range of motion 0-120 with slight discomfort noted at terminal flexion  Crepitation is also appreciated with passive range of motion as well  Stable to varus and valgus stress without pain    Stable Lachman test   Negative posterior " drawer test   Negative Jenniffer's test   Negative patellar grind test   Sensation is intact distally  Right hip: No gross deformity  There is significant tenderness over the greater trochanter  No tenderness anterior  Hip range of motion is intact and full without significant pain  Negative CORAZON and FADIR test   Negative logroll test   Negative straight leg raise and resisted straight leg raise test     Eyes: Anicteric sclerae  ENT: Trachea midline  Lungs: Normal respiratory effort  CV: Capillary refill is less than 2 seconds  Skin: Intact without erythema  Lymph: No palpable lymphadenopathy  Neuro: Sensation is grossly intact to light touch  Psych: Mood and affect are appropriate  Data Review     I have personally reviewed pertinent films in PACS, and my interpretation follows:    X-rays right knee 6/1/2023: No acute osseous abnormality  No fracture or dislocation  Mild tricompartmental degenerative changes  X-rays right hip 6/1/2023: No acute osseous abnormality  No fracture or dislocation  No significant degenerative changes      Past Medical History:   Diagnosis Date   • Depression    • Hypertension        Past Surgical History:   Procedure Laterality Date   • HERNIA REPAIR  1999       Allergies   Allergen Reactions   • Shellfish Allergy - Food Allergy        Current Outpatient Medications on File Prior to Visit   Medication Sig Dispense Refill   • albuterol (Ventolin HFA) 90 mcg/act inhaler Inhale 2 puffs every 6 (six) hours as needed for wheezing 18 g 1   • ergocalciferol (VITAMIN D2) 50,000 units TAKE 1 CAPSULE BY MOUTH ONE TIME PER WEEK 12 capsule 1   • FLUoxetine (PROzac) 40 MG capsule TAKE 1 CAPSULE BY MOUTH EVERY DAY 90 capsule 1   • lisinopril-hydrochlorothiazide (PRINZIDE,ZESTORETIC) 20-25 MG per tablet Take 1 tablet by mouth daily 90 tablet 1   • meloxicam (MOBIC) 15 mg tablet Take 1 tablet (15 mg total) by mouth daily as needed for moderate pain 30 tablet 1   • rosuvastatin (CRESTOR) 5 mg tablet TAKE 1 TABLET (5 MG TOTAL) BY MOUTH DAILY  90 tablet 1   • fluticasone (FLONASE) 50 mcg/act nasal spray Every 12 hours (Patient not taking: Reported on 2023)     • neomycin-polymyxin-hydrocortisone (CORTISPORIN) 0 35%-10,000 units/mL-1% otic suspension Administer 4 drops into both ears 4 (four) times a day (Patient not taking: Reported on 2023) 10 mL 0     No current facility-administered medications on file prior to visit  Social History     Tobacco Use   • Smoking status: Former     Packs/day: 1 00     Types: Cigarettes     Quit date: 2005     Years since quittin 4   • Smokeless tobacco: Never   • Tobacco comments:     10 per day  No passive smoke exposure   Vaping Use   • Vaping Use: Never used   Substance Use Topics   • Alcohol use: Never   • Drug use: Not Currently     Frequency: 1 0 times per week     Types: Marijuana     Comment: many ears ago       Family History   Problem Relation Age of Onset   • Hypertension Mother    • Diabetes Mother         Mellitus   • Pancreatic cancer Father    • Diabetes Father         Mellitus   • Coronary artery disease Father    • No Known Problems Sister    • No Known Problems Sister    • No Known Problems Sister    • No Known Problems Sister    • No Known Problems Maternal Grandmother    • No Known Problems Maternal Grandfather    • Breast cancer Paternal Grandmother    • No Known Problems Paternal Grandfather    • Cervical cancer Maternal Aunt    • Cancer Maternal Aunt         cervical   • Breast cancer Paternal Aunt 59   • Diabetes Family         Mellitus   • No Known Problems Brother    • No Known Problems Brother    • No Known Problems Brother        Review of Systems     As stated in the HPI  All other systems reviewed and are negative        Large joint arthrocentesis: R greater trochanteric bursa  Procedure Details  Location: hip - R greater trochanteric bursa  Needle size: 22 G  Ultrasound guidance: no  Approach: lateral  Medications administered: 2 mL lidocaine 1 %; 1 mL methylPREDNISolone acetate 40 mg/mL    Patient tolerance: patient tolerated the procedure well with no immediate complications  Dressing:  Sterile dressing applied

## 2023-06-22 ENCOUNTER — EVALUATION (OUTPATIENT)
Dept: PHYSICAL THERAPY | Facility: REHABILITATION | Age: 43
End: 2023-06-22
Payer: COMMERCIAL

## 2023-06-22 DIAGNOSIS — M70.61 GREATER TROCHANTERIC BURSITIS OF RIGHT HIP: ICD-10-CM

## 2023-06-22 DIAGNOSIS — M17.11 PRIMARY OSTEOARTHRITIS OF RIGHT KNEE: ICD-10-CM

## 2023-06-22 PROCEDURE — 97162 PT EVAL MOD COMPLEX 30 MIN: CPT | Performed by: PHYSICAL THERAPIST

## 2023-06-22 NOTE — PROGRESS NOTES
PT Evaluation     Today's date: 2023  Patient name: Vada Closs  : 1980  MRN: [id number]  Referring provider: Laurelyn Primrose*  Dx:   Encounter Diagnosis     ICD-10-CM    1  Primary osteoarthritis of right knee  M17 11 Ambulatory Referral to Physical Therapy      2  Greater trochanteric bursitis of right hip  M70 61 Ambulatory Referral to Physical Therapy                     Assessment  Assessment details: Pt is a 36 yo female with acute right lower extremity pain and chronic LBP  Pain began after carrying children around and then sitting for an extended car ride  She presents with tenderness over the greater trochanter, piriformis, SIJs, and medial knee  ROM is WNL at the hip  Lumbar spine and knee but painful  She presents with mild strength deficits at the hip and knee as well  Pain has decreased following hip injection and with taking anti-inflammatory  Symptoms suggest trochanteric bursitis and knee strain but there is likely a lumbar contributing factor  She would benefit from a comprehensive exercise program to improve strength and decrease pain with ROM and functional activities       Impairments: abnormal muscle tone, abnormal or restricted ROM, impaired physical strength, lacks appropriate home exercise program, pain with function and weight-bearing intolerance    Symptom irritability: moderateUnderstanding of Dx/Px/POC: excellent  Goals  STG: in 2 weeks  Pt performing HEP consistently  Strength WNL  Taking 15 min walk without increased pain  LTG: by d/c  Able to sit for work without increased pain  Able to carry children without increased pain  Able to go for a one hour walk without increased pain    Plan  Patient would benefit from: skilled physical therapy  Referral necessary: No  Planned therapy interventions: manual therapy, neuromuscular re-education, patient education, postural training, strengthening, stretching, therapeutic exercise and home exercise program  Frequency: 1x week  Duration in weeks: 8  Treatment plan discussed with: patient        Subjective Evaluation    History of Present Illness  Mechanism of injury: Knee and hip pain began about a month ago  She was carrying her nieces and nephews around  She rode about 45 min then got out of the car and could barely walk  She received a cortison injection on Monday and she is taking Meloxicam for the pain and is feeling a little better  Knee pain is occasionally sharp and shooting  Pain  Current pain ratin  At best pain ratin  At worst pain ratin  Location: Back of the hip and front and back of the knee    Social Support  Steps to enter house: yes  Stairs in house: yes     Life stress: [employer] work/ occasionally walks the warehouse,     Patient Goals  Patient goals for therapy: decreased pain  Patient goal: Walk more comfortably for fitness, go to an amusement park, sit comfortably for work        Objective     Palpation   Left   Tenderness of the distal biceps femoris  Tenderness     Right Hip   Tenderness in the greater trochanter  Right Knee   Tenderness in the medial joint line  No tenderness in the lateral joint line, lateral patella, medial patella, patellar tendon, popliteal fossa and superior patella       Additional Tenderness Details  piriformis    Neurological Testing     Sensation     Lumbar   Left   Intact: light touch    Right   Intact: light touch    Active Range of Motion     Lumbar   Flexion:  WFL  Extension:  WFL  Left lateral flexion:  Restriction level: moderate  Right lateral flexion:  Restriction level: minimal  Left Knee   Hyperextension  Flexion: WFL    Right Knee   Hyperextension   Flexion: WFL and with pain    Passive Range of Motion   Left Knee   Normal passive range of motion    Right Knee   Normal passive range of motion    Joint Play   Joints within functional limits: L1, L2, L3, L4, L5 and S1     Pain: L5 and S1   Mechanical "Assessment    Cervical      Thoracic      Lumbar    Standing flexion: repeated movements   Pain intensity: worse  Standing extension: repeated movements  Pain intensity: worse  Lying extension: repeated movements  Pain location: no change    Strength/Myotome Testing     Left Hip   Planes of Motion   Flexion: 4+  Extension: 5  Abduction: 4+  External rotation: 4  Internal rotation: 4    Right Hip   Planes of Motion   Flexion: 4+  Extension: 5  Abduction: 4 (painful)  External rotation: 4  Internal rotation: 4    Left Knee   Flexion: 5  Extension: 5  Quadriceps contraction: good    Right Knee   Flexion: 4+  Extension: 4 (pain)  Quadriceps contraction: good    Left Ankle/Foot   Dorsiflexion: 5  Plantar flexion: 4  Great toe extension: 5    Right Ankle/Foot   Dorsiflexion: 5  Plantar flexion: 4  Great toe extension: 5    Tests     Lumbar     Left   Negative femoral stretch, passive SLR and slump test      Right   Positive passive SLR  Negative femoral stretch and slump test      Right Hip   Positive CORAZON, FADIR and piriformis  Negative Ely's and modified [place]  Precautions: HTN, Asthma      Manuals 6/22              Needs foto                                                  Exercise             bike             QS 5\"x10            SLR x10            Piriformis stretch w/towel 20\"x3            bridges             clams                                                    Prone press ups x10                                                                                          Sitting posture instr NT                                      Gait Training                                       Modalities                                           Access Code: [email or web address]  URL: [email or web address]  Date: 06/22/2023  Prepared by: Estelita Sorensen    Exercises  - Supine Quad Set  - 1 x daily - 7 x weekly - 1 sets - 10 reps - 5 sec hold  - Active Straight Leg Raise with Quad Set  - 1 x daily - 7 x weekly - 1 " sets - 10 reps  - Supine Piriformis Stretch with Leg Straight  - 2 x daily - 7 x weekly - 1 sets - 3 reps - 15 sec hold  - Prone Press Up  - 2 x daily - 7 x weekly - 2 sets - 10 reps  - Standing Lumbar Extension  - 5 x daily - 7 x weekly - 1 sets - 10 reps

## 2023-06-27 ENCOUNTER — OFFICE VISIT (OUTPATIENT)
Dept: PHYSICAL THERAPY | Facility: REHABILITATION | Age: 43
End: 2023-06-27
Payer: COMMERCIAL

## 2023-06-27 DIAGNOSIS — M70.61 GREATER TROCHANTERIC BURSITIS OF RIGHT HIP: ICD-10-CM

## 2023-06-27 DIAGNOSIS — M17.11 PRIMARY OSTEOARTHRITIS OF RIGHT KNEE: Primary | ICD-10-CM

## 2023-06-27 PROCEDURE — 97140 MANUAL THERAPY 1/> REGIONS: CPT | Performed by: PHYSICAL THERAPIST

## 2023-06-27 PROCEDURE — 97110 THERAPEUTIC EXERCISES: CPT | Performed by: PHYSICAL THERAPIST

## 2023-06-27 PROCEDURE — G0283 ELEC STIM OTHER THAN WOUND: HCPCS | Performed by: PHYSICAL THERAPIST

## 2023-06-27 PROCEDURE — 97014 ELECTRIC STIMULATION THERAPY: CPT | Performed by: PHYSICAL THERAPIST

## 2023-06-27 NOTE — PROGRESS NOTES
"Daily Note     Today's date: 2023  Patient name: Kendra Ro  : 1980  MRN: [id number]  Referring provider: Joyce Morales*  Dx:   Encounter Diagnosis     ICD-10-CM    1  Primary osteoarthritis of right knee  M17 11       2  Greater trochanteric bursitis of right hip  M70 61                      Subjective: Pt reports that her hip is feeling better but her knee has been very sore  Objective: See treatment diary below      Assessment: Tolerated treatment well  Pt received TENS and ice to address pain and inflammation and KT to address PF dysfunction  Pt felt better after therapy today  Patient would benefit from continued PT      Plan: Continue per plan of care        Precautions: HTN, Asthma      Manuals            KT 3 I strips ant knee  NT                                                  Exercise             bike             QS 5\"x10 5\"x15           SLR x10 x10           Piriformis stretch w/towel 20\"x3 20\"x3           bridges  5\"x10           clams  5\"x15                                                  Prone press ups x10 x10                                                                                         Sitting posture instr NT                                      Gait Training                                       Modalities             Ice and TENS  10'                             "

## 2023-07-05 ENCOUNTER — OFFICE VISIT (OUTPATIENT)
Dept: PHYSICAL THERAPY | Facility: REHABILITATION | Age: 43
End: 2023-07-05
Payer: COMMERCIAL

## 2023-07-05 DIAGNOSIS — M17.11 PRIMARY OSTEOARTHRITIS OF RIGHT KNEE: Primary | ICD-10-CM

## 2023-07-05 DIAGNOSIS — M70.61 GREATER TROCHANTERIC BURSITIS OF RIGHT HIP: ICD-10-CM

## 2023-07-05 PROCEDURE — 97014 ELECTRIC STIMULATION THERAPY: CPT | Performed by: PHYSICAL THERAPIST

## 2023-07-05 PROCEDURE — G0283 ELEC STIM OTHER THAN WOUND: HCPCS | Performed by: PHYSICAL THERAPIST

## 2023-07-05 PROCEDURE — 97140 MANUAL THERAPY 1/> REGIONS: CPT | Performed by: PHYSICAL THERAPIST

## 2023-07-05 PROCEDURE — 97110 THERAPEUTIC EXERCISES: CPT | Performed by: PHYSICAL THERAPIST

## 2023-07-05 NOTE — PROGRESS NOTES
Daily Note     Today's date: 2023  Patient name: Sudhir Orozco  : 1980  MRN: [id number]  Referring provider: Jude Weiss*  Dx:   Encounter Diagnosis     ICD-10-CM    1. Primary osteoarthritis of right knee  M17.11       2. Greater trochanteric bursitis of right hip  M70.61                      Subjective: Pt states that the tape did not change how her knee felt. She does note that overall she is less painful and she is walking better. Objective: See treatment diary below      Assessment: Tolerated treatment well. Patella ROM is still painful and restricted. Tolerance to activity has increased. KT did not appear to be helpful. Patient would benefit from continued PT      Plan: Continue per plan of care.       Precautions: HTN, Asthma      Manuals           KT 3 I strips ant knee  NT -          Patella med glides   NT                                    Exercise             bike   5'          QS 5"x10 5"x15 5"x15          SLR x10 x10 2x10          Piriformis stretch w/towel 20"x3 20"x3 20"x3          bridges  5"x10 5"x10          clams  5"x15 5"x15          SL hip abd   x10          SAQ   5"x10                       Prone press ups x10 x10                                                                                         Sitting posture instr NT                                      Gait Training                                       Modalities             Ice and TENS  10' 10'

## 2023-07-12 ENCOUNTER — OFFICE VISIT (OUTPATIENT)
Dept: PHYSICAL THERAPY | Facility: REHABILITATION | Age: 43
End: 2023-07-12
Payer: COMMERCIAL

## 2023-07-12 DIAGNOSIS — M70.61 GREATER TROCHANTERIC BURSITIS OF RIGHT HIP: ICD-10-CM

## 2023-07-12 DIAGNOSIS — M17.11 PRIMARY OSTEOARTHRITIS OF RIGHT KNEE: Primary | ICD-10-CM

## 2023-07-12 PROCEDURE — 97014 ELECTRIC STIMULATION THERAPY: CPT

## 2023-07-12 PROCEDURE — 97110 THERAPEUTIC EXERCISES: CPT

## 2023-07-12 PROCEDURE — 97140 MANUAL THERAPY 1/> REGIONS: CPT

## 2023-07-12 PROCEDURE — G0283 ELEC STIM OTHER THAN WOUND: HCPCS

## 2023-07-12 NOTE — PROGRESS NOTES
Daily Note     Today's date: 2023  Patient name: Rodrigo Henderson  : 1980  MRN: [id number]  Referring provider: Quinton Roles*  Dx:   Encounter Diagnosis     ICD-10-CM    1. Primary osteoarthritis of right knee  M17.11       2. Greater trochanteric bursitis of right hip  M70.61                      Subjective:   Patient report that she is walking better so things are improving. She notes that her R hip continues to bother her. Objective: See treatment diary below      Assessment: Patient tolerated treatment well. Patient performed ex and received manual therapy as noted. Patient reported pain with SAQ's which diminished with ball squeeze. Candelario  reported feeling good post treatment. Patient would benefit from continued PT to address deficits and attain set goals. Plan: Continue per plan of care.       Precautions: HTN, Asthma      Manuals          KT 3 I strips ant knee  NT -          Patella med glides   NT CC                                   Exercise             bike   5' 5'         QS 5"x10 5"x15 5"x15 5"x20         SLR x10 x10 2x10 2x10         Piriformis stretch w/towel 20"x3 20"x3 20"x3 20"x3         bridges  5"x10 5"x10 5"15         clams  5"x15 5"x15 5"x15         SL hip abd   x10 2x10         SAQ   5"x10 W/ball squeeze  5"x10         LAQ    5"x15         Prone press ups x10 x10                                                                                         Sitting posture instr NT                                      Gait Training                                       Modalities             Ice and TENS  10' 10' 10'

## 2023-07-19 ENCOUNTER — APPOINTMENT (OUTPATIENT)
Dept: PHYSICAL THERAPY | Facility: REHABILITATION | Age: 43
End: 2023-07-19
Payer: COMMERCIAL

## 2023-07-26 ENCOUNTER — APPOINTMENT (OUTPATIENT)
Dept: PHYSICAL THERAPY | Facility: REHABILITATION | Age: 43
End: 2023-07-26
Payer: COMMERCIAL

## 2023-07-30 DIAGNOSIS — F32.A DEPRESSION, UNSPECIFIED DEPRESSION TYPE: ICD-10-CM

## 2023-07-30 DIAGNOSIS — I10 ESSENTIAL HYPERTENSION: ICD-10-CM

## 2023-07-31 ENCOUNTER — OFFICE VISIT (OUTPATIENT)
Dept: OBGYN CLINIC | Facility: CLINIC | Age: 43
End: 2023-07-31
Payer: COMMERCIAL

## 2023-07-31 VITALS
SYSTOLIC BLOOD PRESSURE: 128 MMHG | HEART RATE: 58 BPM | HEIGHT: 65 IN | BODY MASS INDEX: 48.82 KG/M2 | DIASTOLIC BLOOD PRESSURE: 66 MMHG | OXYGEN SATURATION: 98 % | WEIGHT: 293 LBS

## 2023-07-31 DIAGNOSIS — M17.11 PRIMARY OSTEOARTHRITIS OF RIGHT KNEE: ICD-10-CM

## 2023-07-31 DIAGNOSIS — M70.61 GREATER TROCHANTERIC BURSITIS OF RIGHT HIP: Primary | ICD-10-CM

## 2023-07-31 PROCEDURE — 99213 OFFICE O/P EST LOW 20 MIN: CPT | Performed by: PHYSICIAN ASSISTANT

## 2023-07-31 RX ORDER — FLUOXETINE HYDROCHLORIDE 40 MG/1
CAPSULE ORAL
Qty: 90 CAPSULE | Refills: 1 | Status: SHIPPED | OUTPATIENT
Start: 2023-07-31

## 2023-07-31 RX ORDER — MELOXICAM 15 MG/1
15 TABLET ORAL DAILY PRN
Qty: 30 TABLET | Refills: 1 | Status: SHIPPED | OUTPATIENT
Start: 2023-07-31

## 2023-07-31 RX ORDER — LISINOPRIL AND HYDROCHLOROTHIAZIDE 25; 20 MG/1; MG/1
1 TABLET ORAL DAILY
Qty: 90 TABLET | Refills: 1 | Status: SHIPPED | OUTPATIENT
Start: 2023-07-31

## 2023-07-31 NOTE — TELEPHONE ENCOUNTER
pharmacy requesting refill on fluoxetine and lisinopril/hctz  Last seen 5/31/23  Medication sent to pharmacy

## 2023-07-31 NOTE — PROGRESS NOTES
Patient Name:  Ana Lilia Love  MRN:  [id number]    [address]     Resolved right hip greater trochanter bursitis. Right knee DJD. 1. Patient notes near full resolution of her symptoms at this time. 2. Recommend patient continue home exercises. Refill provided of meloxicam to take and as needed. 3. Activities as tolerated with modification avoid pain. 4. Follow-up as needed. Chief Complaint     Follow-up right hip and right knee. History of the Present Illness     Ana Lilia Love is a 37 y.o. female who returns to the office today for follow-up regarding her right hip and right knee. She was initially seen on 6/19/2023. At that time she received a corticosteroid injection into the right hip greater trochanteric bursa. She was offered but declined a right knee intra-articular corticosteroid injection at that time. At that time she was also prescribed meloxicam and referred to physical therapy. She returns to the office today noting 90% improvement of her symptoms in her right hip. She only notes occasional discomfort in the lateral hip at this time. She denies any radiation into the groin or distally into the the right lower extremity. She notes improvement in her right knee as well. She notes only intermittent generalized right knee pain with occasional clicking. She denies any weakness or instability of the right lower extremity. No numbness or tingling. No fevers or chills. She is happy with her result. Physical Exam     /66   Pulse 58   Ht 5' 5" (1.651 m)   Wt (!) 162 kg (357 lb)   SpO2 98%   BMI 59.41 kg/m²     Right knee: No gross deformity. Skin intact. No erythema ecchymosis or swelling. No effusion. No joint line tenderness. Range of motion 0-120 without discomfort. Crepitation is  appreciated with passive range of motion. Stable to varus and valgus stress without pain.   Stable Lachman test.  Negative posterior drawer test.  Negative Jenniffer's test. Negative patellar grind test.  Sensation is intact distally.     Right hip: No gross deformity. No tenderness over the greater trochanter. No tenderness anterior. Hip range of motion is intact and full without significant pain. Negative CORAZON and FADIR test.  Negative logroll test.  Negative straight leg raise and resisted straight leg raise test.    Eyes: Anicteric sclerae. ENT: Trachea midline. Lungs: Normal respiratory effort. CV: Capillary refill is less than 2 seconds. Skin: Intact without erythema. Lymph: No palpable lymphadenopathy. Neuro: Sensation is grossly intact to light touch. Psych: Mood and affect are appropriate. Data Review     I have personally reviewed pertinent films in PACS, and my interpretation follows:    X-rays right knee 6/1/2023: No acute osseous abnormality. No fracture or dislocation. Mild tricompartmental degenerative changes.     X-rays right hip 6/1/2023: No acute osseous abnormality. No fracture or dislocation. No significant degenerative changes. Past Medical History:   Diagnosis Date   • Asthma    • Depression    • Hypertension        Past Surgical History:   Procedure Laterality Date   • HERNIA REPAIR  1999       Allergies   Allergen Reactions   • Shellfish Allergy - Food Allergy        Current Outpatient Medications on File Prior to Visit   Medication Sig Dispense Refill   • albuterol (Ventolin HFA) 90 mcg/act inhaler Inhale 2 puffs every 6 (six) hours as needed for wheezing 18 g 1   • ergocalciferol (VITAMIN D2) 50,000 units TAKE 1 CAPSULE BY MOUTH ONE TIME PER WEEK 12 capsule 1   • FLUoxetine (PROzac) 40 MG capsule TAKE 1 CAPSULE BY MOUTH EVERY DAY 90 capsule 1   • lisinopril-hydrochlorothiazide (PRINZIDE,ZESTORETIC) 20-25 MG per tablet TAKE 1 TABLET BY MOUTH EVERY DAY 90 tablet 1   • rosuvastatin (CRESTOR) 5 mg tablet TAKE 1 TABLET (5 MG TOTAL) BY MOUTH DAILY.  90 tablet 1   • [DISCONTINUED] meloxicam (MOBIC) 15 mg tablet Take 1 tablet (15 mg total) by mouth daily as needed for moderate pain 30 tablet 1   • fluticasone (FLONASE) 50 mcg/act nasal spray Every 12 hours (Patient not taking: Reported on 2023)     • neomycin-polymyxin-hydrocortisone (CORTISPORIN) 0.35%-10,000 units/mL-1% otic suspension Administer 4 drops into both ears 4 (four) times a day (Patient not taking: Reported on 2023) 10 mL 0   • [DISCONTINUED] FLUoxetine (PROzac) 40 MG capsule TAKE 1 CAPSULE BY MOUTH EVERY DAY 90 capsule 1   • [DISCONTINUED] lisinopril-hydrochlorothiazide (PRINZIDE,ZESTORETIC) 20-25 MG per tablet Take 1 tablet by mouth daily 90 tablet 1     No current facility-administered medications on file prior to visit. Social History     Tobacco Use   • Smoking status: Former     Packs/day: 0.00     Years: 0.00     Total pack years: 0.00     Types: Cigarettes     Quit date: 2005     Years since quittin.5   • Smokeless tobacco: Never   • Tobacco comments:     10 per day.  No passive smoke exposure   Vaping Use   • Vaping Use: Never used   Substance Use Topics   • Alcohol use: Not Currently   • Drug use: Not Currently     Frequency: 1.0 times per week     Types: Marijuana     Comment: many ears ago       Family History   Problem Relation Age of Onset   • Hypertension Mother    • Diabetes Mother         Mellitus   • Pancreatic cancer Father    • Diabetes Father         Mellitus   • Coronary artery disease Father    • Cancer Father         Pancreatic   • No Known Problems Sister    • No Known Problems Sister    • No Known Problems Sister    • No Known Problems Sister    • No Known Problems Maternal Grandmother    • No Known Problems Maternal Grandfather    • Breast cancer Paternal Grandmother    • Cancer Paternal Grandmother         Breast   • No Known Problems Paternal Grandfather    • Cervical cancer Maternal Aunt    • Cancer Maternal Aunt         cervical   • Breast cancer Paternal Aunt 59   • Diabetes Family         Mellitus   • No Known Problems Brother    • No Known Problems Brother    • No Known Problems Brother        Review of Systems     As stated in the HPI. All other systems reviewed and are negative.

## 2023-08-09 ENCOUNTER — TELEPHONE (OUTPATIENT)
Dept: FAMILY MEDICINE CLINIC | Facility: CLINIC | Age: 43
End: 2023-08-09

## 2023-08-09 NOTE — TELEPHONE ENCOUNTER
THIS NOTE DOES NOT BELOW TO THIS PATIENT. BELOW MESSAGE IS IN ERROR. pz        Hi, this is [employer]. My date of birth is [birth year]. I was just in for an appointment today with Doctor Wilfred Barrera at 11 AM. He had prescribed phentermine for me. I went to check on the status of it with the pharmacy and they're saying that they didn't receive a prescription. It's supposed to be sent to the [facility] on [address] in [facility]. So if you could just be sent over there, that would be great. If anybody needs to reach out to me, my phone number is [phone]. Thanks so much. Bye bye. You received a voice mail from [employer].

## 2023-08-22 ENCOUNTER — OFFICE VISIT (OUTPATIENT)
Dept: FAMILY MEDICINE CLINIC | Facility: CLINIC | Age: 43
End: 2023-08-22
Payer: COMMERCIAL

## 2023-08-22 VITALS
OXYGEN SATURATION: 97 % | HEART RATE: 79 BPM | HEIGHT: 65 IN | TEMPERATURE: 96.9 F | BODY MASS INDEX: 48.82 KG/M2 | DIASTOLIC BLOOD PRESSURE: 76 MMHG | SYSTOLIC BLOOD PRESSURE: 122 MMHG | WEIGHT: 293 LBS | RESPIRATION RATE: 16 BRPM

## 2023-08-22 DIAGNOSIS — E78.49 OTHER HYPERLIPIDEMIA: ICD-10-CM

## 2023-08-22 DIAGNOSIS — I10 ESSENTIAL HYPERTENSION: ICD-10-CM

## 2023-08-22 DIAGNOSIS — J01.00 ACUTE NON-RECURRENT MAXILLARY SINUSITIS: Primary | ICD-10-CM

## 2023-08-22 DIAGNOSIS — R05.8 OTHER COUGH: ICD-10-CM

## 2023-08-22 DIAGNOSIS — Z00.00 HEALTHCARE MAINTENANCE: ICD-10-CM

## 2023-08-22 DIAGNOSIS — F33.41 RECURRENT MAJOR DEPRESSIVE DISORDER, IN PARTIAL REMISSION (HCC): ICD-10-CM

## 2023-08-22 DIAGNOSIS — Z12.4 CERVICAL CANCER SCREENING: ICD-10-CM

## 2023-08-22 PROBLEM — R05.9 COUGH: Status: ACTIVE | Noted: 2023-08-22

## 2023-08-22 PROCEDURE — 99396 PREV VISIT EST AGE 40-64: CPT | Performed by: NURSE PRACTITIONER

## 2023-08-22 PROCEDURE — 99214 OFFICE O/P EST MOD 30 MIN: CPT | Performed by: NURSE PRACTITIONER

## 2023-08-22 RX ORDER — AZITHROMYCIN 250 MG/1
TABLET, FILM COATED ORAL
Qty: 6 TABLET | Refills: 0 | Status: SHIPPED | OUTPATIENT
Start: 2023-08-22 | End: 2023-08-26

## 2023-08-22 RX ORDER — PREDNISONE 50 MG/1
50 TABLET ORAL DAILY
Qty: 5 TABLET | Refills: 0 | Status: SHIPPED | OUTPATIENT
Start: 2023-08-22 | End: 2023-08-27

## 2023-08-22 NOTE — ASSESSMENT & PLAN NOTE
Component      Latest Ref Rng 2/10/2022 6/1/2023   Cholesterol      See Comment mg/dL 206 (H)  130    Triglycerides      See Comment mg/dL 210 (H)  148    HDL      >=50 mg/dL 48 (L)  51    LDL Calculated      0 - 100 mg/dL 116 (H)  49       - Well controlled. - Continue Crestor 5 mg daily.   - Encourage healthy diet and regular exercise. - Will continue to monitor fasting lipid panel.

## 2023-08-22 NOTE — ASSESSMENT & PLAN NOTE
- Prescription sent for prednisone 50 mg daily x 5 days. - Increase oral hydration and use humidifier.  - Contact office if cough does not improve.

## 2023-08-22 NOTE — ASSESSMENT & PLAN NOTE
- Reviewed immunizations and screenings. - Discussed regular dental and vision exams.   - Referred to GYN for cervical cancer screening.   - UTD with mammogram.   - Recent labs reviewed with patient.

## 2023-08-22 NOTE — ASSESSMENT & PLAN NOTE
- Prescription sent for z-wesly and prednisone.   - Continue Flonase. - Increase oral hydration and use humidifier.   - Contact office if symptoms do not improve.

## 2023-08-22 NOTE — PROGRESS NOTES
Name: Aysha Madrigal      : 1980      MRN: [id number]  Encounter Provider: MICHAELLE Rendon  Encounter Date: 2023   Encounter department: [facility]     1. Acute non-recurrent maxillary sinusitis  Assessment & Plan:  - Prescription sent for z-wesly and prednisone.   - Continue Flonase. - Increase oral hydration and use humidifier.   - Contact office if symptoms do not improve. Orders:  -     azithromycin (Zithromax) 250 mg tablet; Take 2 tablets (500 mg total) by mouth daily for 1 day, THEN 1 tablet (250 mg total) daily for 4 days. 2. Other cough  Assessment & Plan:  - Prescription sent for prednisone 50 mg daily x 5 days. - Increase oral hydration and use humidifier.  - Contact office if cough does not improve. Orders:  -     predniSONE 50 mg tablet; Take 1 tablet (50 mg total) by mouth daily for 5 days    3. Recurrent major depressive disorder, in partial remission ([address])  Assessment & Plan:  - Well controlled on Prozac 40 mg daily. Continue same.   - Will continue to monitor. 4. Essential hypertension  Assessment & Plan:  - Well controlled on lisinopril-hydrochlorothiazide daily. Continue same.   - Will continue to monitor. 5. Other hyperlipidemia  Assessment & Plan:  Component      Latest Ref Rng 2/10/2022 2023   Cholesterol      See Comment mg/dL 206 (H)  130    Triglycerides      See Comment mg/dL 210 (H)  148    HDL      >=50 mg/dL 48 (L)  51    LDL Calculated      0 - 100 mg/dL 116 (H)  49       - Well controlled. - Continue Crestor 5 mg daily.   - Encourage healthy diet and regular exercise. - Will continue to monitor fasting lipid panel. 6. Healthcare maintenance  Assessment & Plan:  - Reviewed immunizations and screenings. - Discussed regular dental and vision exams.   - Referred to GYN for cervical cancer screening.   - UTD with mammogram.   - Recent labs reviewed with patient.        7. Cervical cancer screening  -     Ambulatory Referral to Gynecology; Future           Subjective     Patient presents today with complaints of cough, congestion, headache, and sore throat. Also complains of sinus pain/pressure and post-nasal drip. Symptoms started on Friday. Denies any exposure to COVID. She has been using Flonase with mild relief. She denies any other concerns or complaints today. Review of Systems   Constitutional: Negative for fatigue and fever. HENT: Positive for congestion, ear pain (right), postnasal drip, sinus pressure, sinus pain and sore throat. Negative for trouble swallowing. Eyes: Negative for visual disturbance. Respiratory: Positive for cough. Negative for shortness of breath. Cardiovascular: Negative for chest pain and palpitations. Gastrointestinal: Negative for abdominal pain and blood in stool. Endocrine: Negative for cold intolerance and heat intolerance. Genitourinary: Negative for difficulty urinating, dysuria and frequency. Musculoskeletal: Negative for gait problem. Skin: Negative for rash. Neurological: Negative for dizziness, syncope and headaches. Hematological: Negative for adenopathy. Psychiatric/Behavioral: Negative for behavioral problems.        Past Medical History:   Diagnosis Date   • Asthma    • Depression    • Hypertension      Past Surgical History:   Procedure Laterality Date   • HERNIA REPAIR  1999     Family History   Problem Relation Age of Onset   • Hypertension Mother    • Diabetes Mother         Mellitus   • Pancreatic cancer Father    • Diabetes Father         Mellitus   • Coronary artery disease Father    • Cancer Father         Pancreatic   • No Known Problems Sister    • No Known Problems Sister    • No Known Problems Sister    • No Known Problems Sister    • No Known Problems Maternal Grandmother    • No Known Problems Maternal Grandfather    • Breast cancer Paternal Grandmother    • Cancer Paternal Grandmother         Breast   • No Known Problems Paternal Grandfather    • Cervical cancer Maternal Aunt    • Cancer Maternal Aunt         cervical   • Breast cancer Paternal Aunt 59   • Diabetes Family         Mellitus   • No Known Problems Brother    • No Known Problems Brother    • No Known Problems Brother      Social History     Socioeconomic History   • Marital status: Single     Spouse name: None   • Number of children: None   • Years of education: None   • Highest education level: None   Occupational History   • None   Tobacco Use   • Smoking status: Former     Packs/day: 0.00     Years: 0.00     Total pack years: 0.00     Types: Cigarettes     Quit date: 2005     Years since quittin.6   • Smokeless tobacco: Never   • Tobacco comments:     10 per day.  No passive smoke exposure   Vaping Use   • Vaping Use: Never used   Substance and Sexual Activity   • Alcohol use: Not Currently   • Drug use: Not Currently     Frequency: 1.0 times per week     Types: Marijuana     Comment: many ears ago   • Sexual activity: Not Currently     Partners: Male     Birth control/protection: Condom Male   Other Topics Concern   • None   Social History Narrative   • None     Social Determinants of Health     Financial Resource Strain: Not on file   Food Insecurity: Not on file   Transportation Needs: Not on file   Physical Activity: Not on file   Stress: Not on file   Social Connections: Not on file   Intimate Partner Violence: Not on file   Housing Stability: Not on file     Current Outpatient Medications on File Prior to Visit   Medication Sig   • albuterol (Ventolin HFA) 90 mcg/act inhaler Inhale 2 puffs every 6 (six) hours as needed for wheezing   • ergocalciferol (VITAMIN D2) 50,000 units TAKE 1 CAPSULE BY MOUTH ONE TIME PER WEEK   • FLUoxetine (PROzac) 40 MG capsule TAKE 1 CAPSULE BY MOUTH EVERY DAY   • lisinopril-hydrochlorothiazide (PRINZIDE,ZESTORETIC) 20-25 MG per tablet TAKE 1 TABLET BY MOUTH EVERY DAY   • meloxicam (MOBIC) 15 mg tablet Take 1 tablet (15 mg total) by mouth daily as needed for moderate pain   • rosuvastatin (CRESTOR) 5 mg tablet TAKE 1 TABLET (5 MG TOTAL) BY MOUTH DAILY. • fluticasone (FLONASE) 50 mcg/act nasal spray Every 12 hours (Patient not taking: Reported on 6/19/2023)   • neomycin-polymyxin-hydrocortisone (CORTISPORIN) 0.35%-10,000 units/mL-1% otic suspension Administer 4 drops into both ears 4 (four) times a day (Patient not taking: Reported on 6/19/2023)     Allergies   Allergen Reactions   • Shellfish Allergy - Food Allergy      Immunization History   Administered Date(s) Administered   • COVID-19 PFIZER VACCINE 0.3 ML IM 08/02/2021, 08/23/2021, 02/22/2022   • Fluzone Split Quad 0.25 mL 10/05/2017   • INFLUENZA 10/05/2017       Objective     /76 (BP Location: Left arm, Patient Position: Sitting, Cuff Size: Large)   Pulse 79   Temp (!) 96.9 °F (36.1 °C) (Tympanic)   Resp 16   Ht 5' 5" (1.651 m)   Wt (!) 157 kg (347 lb 3.2 oz)   SpO2 97%   BMI 57.78 kg/m²     Physical Exam  Vitals and nursing note reviewed. Constitutional:       General: She is not in acute distress. Appearance: Normal appearance. She is ill-appearing. HENT:      Head: Normocephalic and atraumatic. Right Ear: Tympanic membrane, ear canal and external ear normal.      Left Ear: Tympanic membrane, ear canal and external ear normal.      Nose: Congestion present. Mouth/Throat:      Mouth: Mucous membranes are moist.      Pharynx: No posterior oropharyngeal erythema. Eyes:      Conjunctiva/sclera: Conjunctivae normal.      Pupils: Pupils are equal, round, and reactive to light. Cardiovascular:      Rate and Rhythm: Normal rate and regular rhythm. Heart sounds: Normal heart sounds. Pulmonary:      Effort: Pulmonary effort is normal.      Breath sounds: Normal breath sounds. Abdominal:      General: Bowel sounds are normal.      Palpations: Abdomen is soft. Musculoskeletal:         General: Normal range of motion. Cervical back: Normal range of motion. Lymphadenopathy:      Cervical: No cervical adenopathy. Skin:     General: Skin is warm and dry. Neurological:      Mental Status: She is alert and oriented to person, place, and time.    Psychiatric:         Mood and Affect: Mood normal.         Behavior: Behavior normal.       MICHAELLE Samayoa

## 2023-08-28 ENCOUNTER — TELEPHONE (OUTPATIENT)
Dept: FAMILY MEDICINE CLINIC | Facility: CLINIC | Age: 43
End: 2023-08-28

## 2023-08-28 NOTE — TELEPHONE ENCOUNTER
Pt was seen on 8/22/23  And forgot to ask about starting Phentermine. Please advise if pt needs to schedule another appt.

## 2023-08-28 NOTE — TELEPHONE ENCOUNTER
----- Message from Danilo Potter sent at 8/25/2023  8:14 PM EDT -----  Regarding: Follow up to visit Monday   Contact: [phone]  I am so sorry I completely forgot to ask you about   Phentermine. My sister recently started taking it and she is having success. If I need to reschedule and come in let me know. I want to see if this is a good option to help me shed some weight. Thanks!

## 2023-08-28 NOTE — TELEPHONE ENCOUNTER
Would suggest scheduling appointment so we can talk about different weight loss options as well as side effects. Yes

## 2023-09-21 DIAGNOSIS — E78.49 OTHER HYPERLIPIDEMIA: ICD-10-CM

## 2023-09-21 RX ORDER — ROSUVASTATIN CALCIUM 5 MG/1
5 TABLET, COATED ORAL DAILY
Qty: 90 TABLET | Refills: 1 | Status: SHIPPED | OUTPATIENT
Start: 2023-09-21

## 2023-09-27 ENCOUNTER — OFFICE VISIT (OUTPATIENT)
Dept: FAMILY MEDICINE CLINIC | Facility: CLINIC | Age: 43
End: 2023-09-27
Payer: COMMERCIAL

## 2023-09-27 VITALS
WEIGHT: 293 LBS | HEIGHT: 65 IN | TEMPERATURE: 97.5 F | RESPIRATION RATE: 16 BRPM | BODY MASS INDEX: 48.82 KG/M2 | HEART RATE: 78 BPM | DIASTOLIC BLOOD PRESSURE: 70 MMHG | SYSTOLIC BLOOD PRESSURE: 118 MMHG | OXYGEN SATURATION: 97 %

## 2023-09-27 DIAGNOSIS — E66.01 CLASS 3 SEVERE OBESITY DUE TO EXCESS CALORIES WITH SERIOUS COMORBIDITY AND BODY MASS INDEX (BMI) OF 50.0 TO 59.9 IN ADULT (HCC): ICD-10-CM

## 2023-09-27 DIAGNOSIS — I10 ESSENTIAL HYPERTENSION: ICD-10-CM

## 2023-09-27 DIAGNOSIS — M75.42 SHOULDER IMPINGEMENT SYNDROME, LEFT: Primary | ICD-10-CM

## 2023-09-27 PROBLEM — E66.813 CLASS 3 SEVERE OBESITY DUE TO EXCESS CALORIES WITH SERIOUS COMORBIDITY AND BODY MASS INDEX (BMI) OF 50.0 TO 59.9 IN ADULT (HCC): Status: ACTIVE | Noted: 2023-09-27

## 2023-09-27 PROCEDURE — 99214 OFFICE O/P EST MOD 30 MIN: CPT | Performed by: NURSE PRACTITIONER

## 2023-09-27 RX ORDER — PREDNISONE 50 MG/1
50 TABLET ORAL DAILY
Qty: 5 TABLET | Refills: 0 | Status: SHIPPED | OUTPATIENT
Start: 2023-09-27 | End: 2023-10-02

## 2023-09-27 NOTE — PROGRESS NOTES
Name: Aysha Madrigal      : 1980      MRN: [id number]  Encounter Provider: MICHAELLE Rendon  Encounter Date: 2023   Encounter department: [facility]     1. Shoulder impingement syndrome, left  Assessment & Plan:  - Prescription sent for prednisone 50 mg daily x 5 days. Advised of side effects.  - Shoulder exercises given in After Visit Summary.  - Contact office if no improvement. Orders:  -     predniSONE 50 mg tablet; Take 1 tablet (50 mg total) by mouth daily for 5 days    2. Class 3 severe obesity due to excess calories with serious comorbidity and body mass index (BMI) of 50.0 to 59.9 in adult [facility])  Assessment & Plan:  - Prescription sent for Wegovy 0.25 mg weekly x 4 weeks. Discussed potential side effects.  - Encourage healthy diet and regular exercise.  - Recommend follow up in 1 month. Orders:  -     Semaglutide-Weight Management (WEGOVY) 0.25 MG/0.5ML; Inject 0.5 mL (0.25 mg total) under the skin once a week for 4 doses    3. Essential hypertension  Assessment & Plan:  - Well controlled on lisinopril-hydrochlorothiazide daily. Continue same.   - Will continue to monitor. Subjective     Patient presents to office today with complaints of left shoulder and collarbone pain that is radiating up her to her neck. Has been occurring for the past 3 weeks. States pain is achy and has periods where it is sharp. Sometimes pain lasts for hours. It limits her ROM. States she can lift her arm easily but has trouble putting it back down. She denies any known injury or trauma. Has not taking anything over the counter for her pain. She is also interested in weight loss options. States she currently does intermittent fasting. States she finds it hard to find time for exercise as she has 3 kids. She tries to be very active at work. Sets an alarm to get up every 30 minutes to go for a 10 minute walk.  Denies any other concerns or complaints today. Review of Systems   Constitutional: Negative for fatigue and fever. HENT: Negative for trouble swallowing. Eyes: Negative for visual disturbance. Respiratory: Negative for cough and shortness of breath. Cardiovascular: Negative for chest pain and palpitations. Gastrointestinal: Negative for abdominal pain and blood in stool. Endocrine: Negative for cold intolerance and heat intolerance. Genitourinary: Negative for difficulty urinating and dysuria. Musculoskeletal: Positive for arthralgias (left shoulder ). Negative for gait problem. Skin: Negative for rash. Neurological: Negative for dizziness, syncope and headaches. Hematological: Negative for adenopathy. Psychiatric/Behavioral: Negative for behavioral problems.        Past Medical History:   Diagnosis Date   • Asthma    • Depression    • Hypertension      Past Surgical History:   Procedure Laterality Date   • HERNIA REPAIR  1999     Family History   Problem Relation Age of Onset   • Hypertension Mother    • Diabetes Mother         Mellitus   • Pancreatic cancer Father    • Diabetes Father         Mellitus   • Coronary artery disease Father    • Cancer Father         Pancreatic   • No Known Problems Sister    • No Known Problems Sister    • No Known Problems Sister    • No Known Problems Sister    • No Known Problems Maternal Grandmother    • No Known Problems Maternal Grandfather    • Breast cancer Paternal Grandmother    • Cancer Paternal Grandmother         Breast   • No Known Problems Paternal Grandfather    • Cervical cancer Maternal Aunt    • Cancer Maternal Aunt         cervical   • Breast cancer Paternal Aunt 59   • Diabetes Family         Mellitus   • No Known Problems Brother    • No Known Problems Brother    • No Known Problems Brother      Social History     Socioeconomic History   • Marital status: Single     Spouse name: None   • Number of children: None   • Years of education: None   • Highest education level: None   Occupational History   • None   Tobacco Use   • Smoking status: Former     Packs/day: 0.00     Years: 0.00     Total pack years: 0.00     Types: Cigarettes     Quit date: 2005     Years since quittin.7   • Smokeless tobacco: Never   • Tobacco comments:     10 per day. No passive smoke exposure   Vaping Use   • Vaping Use: Never used   Substance and Sexual Activity   • Alcohol use: Not Currently   • Drug use: Not Currently     Frequency: 1.0 times per week     Types: Marijuana     Comment: many ears ago   • Sexual activity: Not Currently     Partners: Male     Birth control/protection: Condom Male   Other Topics Concern   • None   Social History Narrative   • None     Social Determinants of Health     Financial Resource Strain: Not on file   Food Insecurity: Not on file   Transportation Needs: Not on file   Physical Activity: Not on file   Stress: Not on file   Social Connections: Not on file   Intimate Partner Violence: Not on file   Housing Stability: Not on file     Current Outpatient Medications on File Prior to Visit   Medication Sig   • albuterol (Ventolin HFA) 90 mcg/act inhaler Inhale 2 puffs every 6 (six) hours as needed for wheezing   • ergocalciferol (VITAMIN D2) 50,000 units TAKE 1 CAPSULE BY MOUTH ONE TIME PER WEEK   • FLUoxetine (PROzac) 40 MG capsule TAKE 1 CAPSULE BY MOUTH EVERY DAY   • lisinopril-hydrochlorothiazide (PRINZIDE,ZESTORETIC) 20-25 MG per tablet TAKE 1 TABLET BY MOUTH EVERY DAY   • meloxicam (MOBIC) 15 mg tablet Take 1 tablet (15 mg total) by mouth daily as needed for moderate pain   • rosuvastatin (CRESTOR) 5 mg tablet TAKE 1 TABLET (5 MG TOTAL) BY MOUTH DAILY.    • fluticasone (FLONASE) 50 mcg/act nasal spray Every 12 hours (Patient not taking: Reported on 2023)   • neomycin-polymyxin-hydrocortisone (CORTISPORIN) 0.35%-10,000 units/mL-1% otic suspension Administer 4 drops into both ears 4 (four) times a day (Patient not taking: Reported on 2023) Allergies   Allergen Reactions   • Shellfish Allergy - Food Allergy      Immunization History   Administered Date(s) Administered   • COVID-19 PFIZER VACCINE 0.3 ML IM 08/02/2021, 08/23/2021, 02/22/2022   • Fluzone Split Quad 0.25 mL 10/05/2017   • INFLUENZA 10/05/2017       Objective     /70 (BP Location: Left arm, Patient Position: Sitting, Cuff Size: Large)   Pulse 78   Temp 97.5 °F (36.4 °C) (Tympanic)   Resp 16   Ht 5' 5" (1.651 m)   Wt (!) 157 kg (345 lb 3.2 oz)   SpO2 97%   BMI 57.44 kg/m²     Physical Exam  Vitals and nursing note reviewed. Constitutional:       General: She is not in acute distress. Appearance: Normal appearance. She is not ill-appearing. HENT:      Head: Normocephalic and atraumatic. Right Ear: External ear normal.      Left Ear: External ear normal.   Eyes:      Conjunctiva/sclera: Conjunctivae normal.   Cardiovascular:      Rate and Rhythm: Normal rate and regular rhythm. Heart sounds: Normal heart sounds. Pulmonary:      Effort: Pulmonary effort is normal.      Breath sounds: Normal breath sounds. Musculoskeletal:      Left shoulder: No tenderness or bony tenderness. Decreased range of motion (due to pain. pain with adduction ). Cervical back: Normal range of motion. Skin:     General: Skin is warm and dry. Neurological:      Mental Status: She is alert and oriented to person, place, and time.    Psychiatric:         Mood and Affect: Mood normal.         Behavior: Behavior normal.       MICHAELLE Gonzalez

## 2023-09-27 NOTE — PATIENT INSTRUCTIONS
Exercises for Internal and External Shoulder Rotation   WHAT YOU NEED TO KNOW:   Exercises for internal shoulder rotation work the muscles in your chest and front of your shoulder. Exercises for external shoulder rotation work the muscles in the back of your shoulder and upper back. DISCHARGE INSTRUCTIONS:   Call your doctor or physical therapist if:   You have sharp or worsening pain during exercise or at rest.    You have questions or concerns about your shoulder exercises. Before you exercise:  Warm up and stretch before you exercise. Walk or ride a stationary bike for 5 to 10 minutes to help you warm up. Stretching helps increase range of motion. It may also decrease muscle soreness and help prevent another injury. Your healthcare provider will tell you which of the following stretches to do:  Crossover arm stretch:  Relax your shoulders. Hold your upper arm with the opposite hand. Pull your arm across your chest until you feel a stretch. Hold the stretch for 30 seconds. Return to the starting position. Shoulder flexion stretch:  Stand facing a wall. Slowly walk your fingers up the wall until you feel a stretch. Hold the stretch for 30 seconds. Return to the starting position. Sleeper stretch:  Lie on your injured side on a firm, flat surface. Bend the elbow of your injured arm 90° with your hand facing up. Use your arm that is not injured to slowly push your injured arm down. Stop when you feel a stretch at the back of your injured shoulder. Hold the stretch for 30 seconds. Slowly return to the starting position. How to exercise with a weight:  Your healthcare provider or physical therapist will tell you how much weight to use. Shoulder internal rotation:  Sit in a chair. Place a rolled up towel between your elbow and your side. Bend your elbow to 90°. Gently squeeze the towel with your elbow to prevent it from falling out. Hold the weight with your thumb pointing up.  Slowly move the weight across your chest. Stop when your hand reaches your opposite arm. Hold this position for as many seconds as directed. Slowly return to the starting position. Shoulder external rotation:  Lie on your side with your injured shoulder facing up. Bend your elbow 90°. Place a rolled up towel between your elbow and your side. Hold a weight in your hand. Gently squeeze the towel with your elbow to prevent it from falling out. Slowly rotate your arm outward, but keep your elbow bent. Stop when you feel a stretch. Hold this position for 30 seconds or as directed. Slowly return to the starting position. How to exercise with an exercise band: Your healthcare provider or physical therapist will tell you how much resistance to use. Shoulder internal rotation:  Tie one end of the exercise band to a heavy, secure object. Sit in a chair. Place a rolled up towel between your elbow and your side. Bend your elbow to 90°. Gently squeeze the towel with your elbow to prevent it from falling out. Slowly pull the band across your chest. Stop when your hand reaches your opposite arm. Hold this position for as many seconds as directed. Slowly return to the starting position. Shoulder external rotation:  Hold one end of the exercise band on the side that is not injured. Place a rolled up towel between your elbow and your side. Bend your elbow 90°. Squeeze the towel with your elbow to prevent it from falling out. Grab the end of the band and slowly turn your arm outward, but keep your elbow bent. Stop when you feel a stretch. Hold this position for 30 seconds or as directed. Slowly return to the starting position. Follow up with your physical therapist as directed:  Write down your questions so you remember to ask them at your visits. © Copyright [facility] [place] 2023 Information is for End User's use only and may not be sold, redistributed or otherwise used for commercial purposes.   The above information is an  only. It is not intended as medical advice for individual conditions or treatments. Talk to your doctor, nurse or pharmacist before following any medical regimen to see if it is safe and effective for you.

## 2023-09-27 NOTE — ASSESSMENT & PLAN NOTE
- Prescription sent for Wegovy 0.25 mg weekly x 4 weeks. Discussed potential side effects.  - Encourage healthy diet and regular exercise.  - Recommend follow up in 1 month.

## 2023-09-27 NOTE — ASSESSMENT & PLAN NOTE
- Prescription sent for prednisone 50 mg daily x 5 days. Advised of side effects.  - Shoulder exercises given in After Visit Summary.  - Contact office if no improvement.

## 2023-10-18 ENCOUNTER — RA CDI HCC (OUTPATIENT)
Dept: OTHER | Facility: HOSPITAL | Age: 43
End: 2023-10-18

## 2023-10-18 NOTE — PROGRESS NOTES
[address] [facility] coding opportunities       Chart reviewed, no opportunity found: CHART REVIEWED, NO OPPORTUNITY FOUND        Patients Insurance     Medicare Insurance: [employer]

## 2023-10-21 PROBLEM — R05.9 COUGH: Status: RESOLVED | Noted: 2023-08-22 | Resolved: 2023-10-21

## 2023-10-21 PROBLEM — J01.00 ACUTE NON-RECURRENT MAXILLARY SINUSITIS: Status: RESOLVED | Noted: 2023-08-22 | Resolved: 2023-10-21

## 2023-10-21 PROBLEM — Z12.4 CERVICAL CANCER SCREENING: Status: RESOLVED | Noted: 2023-08-22 | Resolved: 2023-10-21

## 2023-11-08 ENCOUNTER — OFFICE VISIT (OUTPATIENT)
Dept: FAMILY MEDICINE CLINIC | Facility: CLINIC | Age: 43
End: 2023-11-08
Payer: COMMERCIAL

## 2023-11-08 VITALS
DIASTOLIC BLOOD PRESSURE: 76 MMHG | BODY MASS INDEX: 48.82 KG/M2 | OXYGEN SATURATION: 96 % | TEMPERATURE: 98.1 F | SYSTOLIC BLOOD PRESSURE: 118 MMHG | HEART RATE: 91 BPM | WEIGHT: 293 LBS | RESPIRATION RATE: 16 BRPM | HEIGHT: 65 IN

## 2023-11-08 DIAGNOSIS — I10 ESSENTIAL HYPERTENSION: ICD-10-CM

## 2023-11-08 DIAGNOSIS — R05.8 OTHER COUGH: Primary | ICD-10-CM

## 2023-11-08 PROBLEM — R05.9 COUGH: Status: ACTIVE | Noted: 2023-11-08

## 2023-11-08 PROCEDURE — 99213 OFFICE O/P EST LOW 20 MIN: CPT | Performed by: NURSE PRACTITIONER

## 2023-11-08 RX ORDER — BENZONATATE 200 MG/1
200 CAPSULE ORAL 3 TIMES DAILY PRN
Qty: 20 CAPSULE | Refills: 0 | Status: SHIPPED | OUTPATIENT
Start: 2023-11-08

## 2023-11-08 NOTE — ASSESSMENT & PLAN NOTE
- Prescription sent for tessalon perles as needed. - Use Flonase nasal spray. - Increase oral hydration and use humidifier.  - Contact office if symptoms do not improve.

## 2023-11-08 NOTE — PROGRESS NOTES
Name: Heike Hamilton      : 1980      MRN: [id number]  Encounter Provider: MICHAELLE Phelps  Encounter Date: 2023   Encounter department: [place]     1. Other cough  Assessment & Plan:  - Prescription sent for tessalon perles as needed. - Use Flonase nasal spray. - Increase oral hydration and use humidifier.  - Contact office if symptoms do not improve. Orders:  -     benzonatate (TESSALON) 200 MG capsule; Take 1 capsule (200 mg total) by mouth 3 (three) times a day as needed for cough    2. Essential hypertension  Assessment & Plan:  - Well controlled on lisinopril-hydrochlorothiazide daily. Continue same.   - Will continue to monitor. Subjective     Patient presents today for follow up. She was not able to start [facility] as it wasn't covered by her insurance. She looked into phentermine but does not like the side effects. She has complaints today of cough since Saturday. Denies any other URI symptoms. Denies any other concerns or complaints today. Review of Systems   Constitutional:  Negative for chills, fatigue and fever. HENT:  Negative for ear pain, sinus pressure, sinus pain and trouble swallowing. Eyes:  Negative for visual disturbance. Respiratory:  Positive for cough. Negative for shortness of breath. Cardiovascular:  Negative for chest pain and palpitations. Gastrointestinal:  Negative for abdominal pain and blood in stool. Endocrine: Negative for cold intolerance and heat intolerance. Genitourinary:  Negative for difficulty urinating and dysuria. Musculoskeletal:  Negative for gait problem. Skin:  Negative for rash. Neurological:  Negative for dizziness, syncope and headaches. Hematological:  Negative for adenopathy. Psychiatric/Behavioral:  Negative for behavioral problems.         Past Medical History:   Diagnosis Date   • Asthma    • Depression    • Hypertension      Past Surgical History:   Procedure Laterality Date   • HERNIA REPAIR       Family History   Problem Relation Age of Onset   • Hypertension Mother    • Diabetes Mother         Mellitus   • Pancreatic cancer Father    • Diabetes Father         Mellitus   • Coronary artery disease Father    • Cancer Father         Pancreatic   • No Known Problems Sister    • No Known Problems Sister    • No Known Problems Sister    • No Known Problems Sister    • No Known Problems Maternal Grandmother    • No Known Problems Maternal Grandfather    • Breast cancer Paternal Grandmother    • Cancer Paternal Grandmother         Breast   • No Known Problems Paternal Grandfather    • Cervical cancer Maternal Aunt    • Cancer Maternal Aunt         cervical   • Breast cancer Paternal Aunt 59   • Diabetes Family         Mellitus   • No Known Problems Brother    • No Known Problems Brother    • No Known Problems Brother      Social History     Socioeconomic History   • Marital status: Single     Spouse name: None   • Number of children: None   • Years of education: None   • Highest education level: None   Occupational History   • None   Tobacco Use   • Smoking status: Former     Packs/day: 0.00     Years: 0.00     Total pack years: 0.00     Types: Cigarettes     Quit date: 2005     Years since quittin.8   • Smokeless tobacco: Never   • Tobacco comments:     10 per day.  No passive smoke exposure   Vaping Use   • Vaping Use: Never used   Substance and Sexual Activity   • Alcohol use: Not Currently   • Drug use: Not Currently     Frequency: 1.0 times per week     Types: Marijuana     Comment: many ears ago   • Sexual activity: Not Currently     Partners: Male     Birth control/protection: Condom Male   Other Topics Concern   • None   Social History Narrative   • None     Social Determinants of Health     Financial Resource Strain: Not on file   Food Insecurity: Not on file   Transportation Needs: Not on file   Physical Activity: Not on file Stress: Not on file   Social Connections: Not on file   Intimate Partner Violence: Not on file   Housing Stability: Not on file     Current Outpatient Medications on File Prior to Visit   Medication Sig   • albuterol (Ventolin HFA) 90 mcg/act inhaler Inhale 2 puffs every 6 (six) hours as needed for wheezing   • ergocalciferol (VITAMIN D2) 50,000 units TAKE 1 CAPSULE BY MOUTH ONE TIME PER WEEK   • FLUoxetine (PROzac) 40 MG capsule TAKE 1 CAPSULE BY MOUTH EVERY DAY   • lisinopril-hydrochlorothiazide (PRINZIDE,ZESTORETIC) 20-25 MG per tablet TAKE 1 TABLET BY MOUTH EVERY DAY   • meloxicam (MOBIC) 15 mg tablet Take 1 tablet (15 mg total) by mouth daily as needed for moderate pain   • rosuvastatin (CRESTOR) 5 mg tablet TAKE 1 TABLET (5 MG TOTAL) BY MOUTH DAILY. • fluticasone (FLONASE) 50 mcg/act nasal spray Every 12 hours (Patient not taking: Reported on 6/19/2023)   • neomycin-polymyxin-hydrocortisone (CORTISPORIN) 0.35%-10,000 units/mL-1% otic suspension Administer 4 drops into both ears 4 (four) times a day (Patient not taking: Reported on 6/19/2023)     Allergies   Allergen Reactions   • Shellfish Allergy - Food Allergy      Immunization History   Administered Date(s) Administered   • COVID-19 PFIZER VACCINE 0.3 ML IM 08/02/2021, 08/23/2021, 02/22/2022   • Fluzone Split Quad 0.25 mL 10/05/2017   • INFLUENZA 10/05/2017       Objective     /76 (BP Location: Left arm, Patient Position: Sitting, Cuff Size: Large)   Pulse 91   Temp 98.1 °F (36.7 °C) (Tympanic)   Resp 16   Ht 5' 5" (1.651 m)   Wt (!) 157 kg (345 lb 3.2 oz)   SpO2 96%   BMI 57.44 kg/m²     Physical Exam  Vitals and nursing note reviewed. Constitutional:       General: She is not in acute distress. Appearance: Normal appearance. She is not ill-appearing. HENT:      Head: Normocephalic and atraumatic.       Right Ear: Tympanic membrane, ear canal and external ear normal.      Left Ear: Tympanic membrane, ear canal and external ear normal.      Nose: Nose normal.      Mouth/Throat:      Mouth: Mucous membranes are moist.      Pharynx: No posterior oropharyngeal erythema. Eyes:      Conjunctiva/sclera: Conjunctivae normal.      Pupils: Pupils are equal, round, and reactive to light. Cardiovascular:      Rate and Rhythm: Normal rate and regular rhythm. Heart sounds: Normal heart sounds. Pulmonary:      Effort: Pulmonary effort is normal.      Breath sounds: Normal breath sounds. Musculoskeletal:         General: Normal range of motion. Cervical back: Normal range of motion. Lymphadenopathy:      Cervical: No cervical adenopathy. Skin:     General: Skin is warm and dry. Neurological:      Mental Status: She is alert and oriented to person, place, and time.    Psychiatric:         Mood and Affect: Mood normal.         Behavior: Behavior normal.       MICHAELLE Velez

## 2023-11-30 ENCOUNTER — OFFICE VISIT (OUTPATIENT)
Dept: OBGYN CLINIC | Facility: CLINIC | Age: 43
End: 2023-11-30
Payer: COMMERCIAL

## 2023-11-30 VITALS
HEIGHT: 65 IN | DIASTOLIC BLOOD PRESSURE: 78 MMHG | BODY MASS INDEX: 48.82 KG/M2 | WEIGHT: 293 LBS | SYSTOLIC BLOOD PRESSURE: 128 MMHG

## 2023-11-30 DIAGNOSIS — Z12.4 CERVICAL CANCER SCREENING: ICD-10-CM

## 2023-11-30 DIAGNOSIS — Z12.4 ENCOUNTER FOR PAPANICOLAOU SMEAR FOR CERVICAL CANCER SCREENING: ICD-10-CM

## 2023-11-30 DIAGNOSIS — N92.1 MENORRHAGIA WITH IRREGULAR CYCLE: ICD-10-CM

## 2023-11-30 DIAGNOSIS — Z11.51 SCREENING FOR HPV (HUMAN PAPILLOMAVIRUS): ICD-10-CM

## 2023-11-30 DIAGNOSIS — Z01.411 ENCOUNTER FOR GYNECOLOGICAL EXAMINATION WITH ABNORMAL FINDING: Primary | ICD-10-CM

## 2023-11-30 DIAGNOSIS — Z12.31 ENCOUNTER FOR SCREENING MAMMOGRAM FOR BREAST CANCER: ICD-10-CM

## 2023-11-30 DIAGNOSIS — R23.2 HOT FLASHES: ICD-10-CM

## 2023-11-30 PROCEDURE — S0610 ANNUAL GYNECOLOGICAL EXAMINA: HCPCS | Performed by: NURSE PRACTITIONER

## 2023-11-30 PROCEDURE — G0145 SCR C/V CYTO,THINLAYER,RESCR: HCPCS | Performed by: NURSE PRACTITIONER

## 2023-11-30 PROCEDURE — G0476 HPV COMBO ASSAY CA SCREEN: HCPCS | Performed by: NURSE PRACTITIONER

## 2023-11-30 NOTE — PATIENT INSTRUCTIONS
Weight Management   WHAT YOU NEED TO KNOW:   Being overweight increases your risk of health conditions such as heart disease, high blood pressure, type 2 diabetes, and certain types of cancer. It can also increase your risk for osteoarthritis, sleep apnea, and other respiratory problems. Aim for a slow, steady weight loss. Even a small amount of weight loss can lower your risk of health problems. DISCHARGE INSTRUCTIONS:   How to lose weight safely:  A safe and healthy way to lose weight is to eat fewer calories and get regular exercise. You can lose up about 1 pound a week by decreasing the number of calories you eat by 500 calories each day. You can decrease calories by eating smaller portion sizes or by cutting out high-calorie foods. Read labels to find out how many calories are in the foods you eat. You can also burn calories with exercise such as walking, swimming, or biking. You will be more likely to keep weight off if you make these changes part of your lifestyle. Exercise at least 30 minutes per day on most days of the week. You can also fit in more physical activity by taking the stairs instead of the elevator or parking farther away from stores. Ask your healthcare provider about the best exercise plan for you. Healthy meal plan for weight management:  A healthy meal plan includes a variety of foods, contains fewer calories, and helps you stay healthy. A healthy meal plan includes the following:     Eat whole-grain foods more often. A healthy meal plan should contain fiber. Fiber is the part of grains, fruits, and vegetables that is not broken down by your body. Whole-grain foods are healthy and provide extra fiber in your diet. Some examples of whole-grain foods are whole-wheat breads and pastas, oatmeal, brown rice, and bulgur. Eat a variety of vegetables every day. Include dark, leafy greens such as spinach, kale, silvina greens, and mustard greens.  Eat yellow and orange vegetables such as carrots, sweet potatoes, and winter squash. Eat a variety of fruits every day. Choose fresh or canned fruit (canned in its own juice or light syrup) instead of juice. Fruit juice has very little or no fiber. Eat low-fat dairy foods. Drink fat-free (skim) milk or 1% milk. Eat fat-free yogurt and low-fat cottage cheese. Try low-fat cheeses such as mozzarella and other reduced-fat cheeses. Choose meat and other protein foods that are low in fat. Choose beans or other legumes such as split peas or lentils. Choose fish, skinless poultry (chicken or turkey), or lean cuts of red meat (beef or pork). Before you cook meat or poultry, cut off any visible fat. Use less fat and oil. Try baking foods instead of frying them. Add less fat, such as margarine, sour cream, regular salad dressing, and mayonnaise to foods. Eat fewer high-fat foods. Some examples of high-fat foods include french fries, doughnuts, ice cream, and cakes. Eat fewer sweets. Limit foods and drinks that are high in sugar. This includes candy, cookies, regular soda, and sweetened drinks. Ways to decrease calories:   Eat smaller portions. Use a small plate with smaller servings. Do not eat second helpings. When you eat at a restaurant, ask for a box and place half of your meal in the box before you eat. Share an entrée with someone else. Replace high-calorie snacks with healthy, low-calorie snacks. Choose fresh fruit, vegetables, fat-free rice cakes, or air-popped popcorn instead of potato chips, nuts, or chocolate. Choose water or calorie-free drinks instead of soda or sweetened drinks. Do not shop for groceries when you are hungry. You may be more likely to make unhealthy food choices. Take a grocery list of healthy foods and shop after you have eaten. Eat regular meals. Do not skip meals. Skipping meals can lead to overeating later in the day. This can make it harder for you to lose weight.  Eat a healthy snack in place of a meal if you do not have time to eat a regular meal. Talk with a dietitian to help you create a meal plan and schedule that is right for you. Other things to consider as you try to lose weight:   Be aware of situations that may give you the urge to overeat, such as eating while watching television. Find ways to avoid these situations. For example, read a book, go for a walk, or do crafts. Meet with a weight loss support group or friends who are also trying to lose weight. This may help you stay motivated to continue working on your weight loss goals. © Copyright Dicie Fruits 2023 Information is for End User's use only and may not be sold, redistributed or otherwise used for commercial purposes. The above information is an  only. It is not intended as medical advice for individual conditions or treatments. Talk to your doctor, nurse or pharmacist before following any medical regimen to see if it is safe and effective for you. Calcium and Osteoporosis   AMBULATORY CARE:   Why calcium is important for osteoporosis:  Calcium is important for osteoporosis because calcium helps build bone mass. Osteoporosis is a long-term medical condition that causes your body to break down more bone than it makes. Your bones become weak, brittle, and more likely to fracture. Your calcium needs:   Women:      19 to 50 years: 1,000 mg    Over 50: 1,200 mg    Pregnant or breastfeeding, 19 years to 50 years: 1,000 mg    Men:      19 to 70: 1,000 mg    Over 70: 1,200 mg    Foods that are high in calcium: The following list shows the number of calcium milligrams (mg) per serving. Your dietitian or healthcare provider can help you create a balanced meal plan for your calcium needs.   Dairy:      1 cup of low-fat plain yogurt (415 mg) or low-fat fruit yogurt (245 to 384 mg)    1½ ounces of shredded cheddar cheese (306 mg) or part skim mozzarella cheese (275 mg)    1 cup of skim, 2%, or whole milk (300 mg)    1 cup of cottage cheese made with 2% milk fat (138 mg)    ½ cup of frozen yogurt (103 mg)    Other foods:      1 cup of calcium-fortified orange juice (300 mg)    ½ cup of cooked silvina greens (220 mg)    4 canned sardines, with bones (242 mg)    ½ cup of tofu (with added calcium) (204 mg)       How to get extra calcium:   Add powdered milk to puddings, cocoa, custard, or hot cereal.    Sift powdered milk into flour when you make cakes, cookies, or breads. Use low-fat or fat-free milk instead of water in pancake mix, mashed potatoes, pudding, or hot breakfast cereal.    Add low-fat or fat-free cheese to salad, soup, or pasta. Add tofu (with added calcium) to vegetable stir-roper. Take calcium supplements if you cannot get enough calcium from the foods you eat. Your body can absorb the most calcium from supplements when you take 500 mg or less at one time. Do not take more than 2,500 mg of calcium supplements each day. Follow up with your doctor or dietitian as directed:  Write down your questions so you remember to ask them during your visits. © Copyright Thana Givens 2023 Information is for End User's use only and may not be sold, redistributed or otherwise used for commercial purposes. The above information is an  only. It is not intended as medical advice for individual conditions or treatments. Talk to your doctor, nurse or pharmacist before following any medical regimen to see if it is safe and effective for you.

## 2023-11-30 NOTE — PROGRESS NOTES
Assessment / Plan    1. Encounter for gynecological examination with abnormal finding  Well woman exam  Pap with HPV updated    2. Encounter for Papanicolaou smear for cervical cancer screening    - Liquid-based pap, screening    3. Screening for HPV (human papillomavirus)    - Liquid-based pap, screening    4. Encounter for screening mammogram for breast cancer  Order provided for next year    - Mammo screening bilateral w 3d & cad; Future    5. Hot flashes    - Estradiol  - Follicle stimulating hormone    6. Menorrhagia with irregular cycle  RV in 4w to review all results, do endometrial sampling and to determine treatment plan. - Estradiol  - Follicle stimulating hormone  - US pelvis complete non OB; Future        Subjective      Kyler Daley is a 37 y.o. female who presents for her annual gynecologic exam.  NEW PATIENT    36 yo G0    Last few months periods have been a little irregular. 9 days long in August, then 17 days in November, then 10 days later bled again. Gets really hot and has been sweating more. 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 Last several months irregular with elongated   duration of flow and short intervals.   Current contraception: abstinence  History of abnormal Pap smear: no  Family history of breast,uterine, ovarian or colon cancer: yes - PGM and pat aunt with breast ca      Menstrual History:  OB History          0    Para   0    Term   0       0    AB   0    Living   0         SAB   0    IAB   0    Ectopic   0    Multiple   0    Live Births   0 Patient's last menstrual period was 11/18/2023. The following portions of the patient's history were reviewed and updated as appropriate: allergies, current medications, past family history, past medical history, past social history, past surgical history, and problem list.    Review of Systems      Review of Systems   Constitutional:  Negative for chills and fever. Respiratory:  Negative for cough and shortness of breath. Gastrointestinal:  Negative for abdominal distention, abdominal pain, blood in stool, constipation, diarrhea, nausea and vomiting. Endocrine:        Hot flashes and sweats   Genitourinary:  Positive for menstrual problem. Negative for difficulty urinating, dysuria, frequency, genital sores, hematuria, pelvic pain, urgency, vaginal bleeding and vaginal discharge. Musculoskeletal:  Negative for arthralgias and myalgias. Breasts:  Negative for skin changes, dimpling, asymmetry, nipple discharge, redness, tenderness or palpable masses    Objective      /78 (BP Location: Right arm, Patient Position: Sitting, Cuff Size: Standard)   Ht 5' 5" (1.651 m)   Wt (!) 157 kg (346 lb 9.6 oz)   LMP 11/18/2023   BMI 57.68 kg/m²   Physical Exam  Constitutional:       General: She is not in acute distress. Appearance: Normal appearance. She is well-developed. She is not ill-appearing or diaphoretic. Comments: Bmi 57.7   HENT:      Head: Normocephalic and atraumatic. Eyes:      Pupils: Pupils are equal, round, and reactive to light. Neck:      Thyroid: No thyromegaly. Pulmonary:      Effort: Pulmonary effort is normal.   Chest:   Breasts:     Breasts are symmetrical.      Right: No inverted nipple, mass, nipple discharge, skin change or tenderness. Left: No inverted nipple, mass, nipple discharge, skin change or tenderness. Abdominal:      General: There is no distension. Palpations: Abdomen is soft. There is no mass. Tenderness:  There is no abdominal tenderness. There is no guarding or rebound. Genitourinary:     General: Normal vulva. Exam position: Lithotomy position. Labia:         Right: No rash, tenderness, lesion or injury. Left: No rash, tenderness, lesion or injury. Vagina: No signs of injury and foreign body. Bleeding (scant, menstrual) present. No vaginal discharge, erythema or tenderness. Cervix: No cervical motion tenderness, discharge or friability. Uterus: Not enlarged and not tender. Adnexa:         Right: No mass or tenderness. Left: No mass or tenderness. Comments: Exam limited by habitus  Musculoskeletal:      Cervical back: Neck supple. Lymphadenopathy:      Cervical: No cervical adenopathy. Upper Body:      Right upper body: No supraclavicular adenopathy. Left upper body: No supraclavicular adenopathy. Skin:     General: Skin is warm and dry. Neurological:      General: No focal deficit present. Mental Status: She is alert and oriented to person, place, and time. Psychiatric:         Mood and Affect: Mood normal.         Behavior: Behavior normal.         Thought Content:  Thought content normal.         Judgment: Judgment normal.

## 2023-12-01 LAB
HPV HR 12 DNA CVX QL NAA+PROBE: NEGATIVE
HPV16 DNA CVX QL NAA+PROBE: POSITIVE
HPV18 DNA CVX QL NAA+PROBE: NEGATIVE

## 2023-12-08 LAB
LAB AP GYN PRIMARY INTERPRETATION: NORMAL
Lab: NORMAL

## 2023-12-12 ENCOUNTER — TELEPHONE (OUTPATIENT)
Dept: OBGYN CLINIC | Facility: CLINIC | Age: 43
End: 2023-12-12

## 2023-12-12 NOTE — TELEPHONE ENCOUNTER
Spoke to patient regarding her pap smear result which showed normal cells with positive HPV type 16. Explained nature of HPV and need for colposcopy to further evaluate the tissue on her cervix. She expressed understanding. Will have staff call her to schedule the procedure.

## 2023-12-13 ENCOUNTER — PATIENT MESSAGE (OUTPATIENT)
Dept: OBGYN CLINIC | Facility: CLINIC | Age: 43
End: 2023-12-13

## 2023-12-14 ENCOUNTER — APPOINTMENT (OUTPATIENT)
Dept: LAB | Facility: IMAGING CENTER | Age: 43
End: 2023-12-14
Payer: COMMERCIAL

## 2023-12-14 DIAGNOSIS — R73.9 HYPERGLYCEMIA: ICD-10-CM

## 2023-12-14 LAB
EST. AVERAGE GLUCOSE BLD GHB EST-MCNC: 128 MG/DL
ESTRADIOL SERPL-MCNC: 42.4 PG/ML
FSH SERPL-ACNC: 8.9 MIU/ML
HBA1C MFR BLD: 6.1 %

## 2023-12-14 PROCEDURE — 83036 HEMOGLOBIN GLYCOSYLATED A1C: CPT

## 2023-12-15 ENCOUNTER — RA CDI HCC (OUTPATIENT)
Dept: OTHER | Facility: HOSPITAL | Age: 43
End: 2023-12-15

## 2023-12-15 NOTE — PROGRESS NOTES
[facility] coding opportunities          Chart Reviewed number of suggestions sent to Provider: 1     Patients Insurance        Commercial Insurance: Faizan Landrum

## 2023-12-16 DIAGNOSIS — E55.9 VITAMIN D DEFICIENCY: ICD-10-CM

## 2023-12-18 RX ORDER — ERGOCALCIFEROL 1.25 MG/1
CAPSULE ORAL
Qty: 12 CAPSULE | Refills: 1 | Status: SHIPPED | OUTPATIENT
Start: 2023-12-18

## 2023-12-19 ENCOUNTER — TELEPHONE (OUTPATIENT)
Dept: FAMILY MEDICINE CLINIC | Facility: CLINIC | Age: 43
End: 2023-12-19

## 2023-12-19 NOTE — TELEPHONE ENCOUNTER
----- Message from MICHAELLE Hodges sent at 12/19/2023  8:08 AM EST -----  A1c elevated at 6.1. recommend low carb diet and regular exercise.

## 2023-12-20 ENCOUNTER — HOSPITAL ENCOUNTER (OUTPATIENT)
Dept: RADIOLOGY | Facility: IMAGING CENTER | Age: 43
Discharge: HOME | End: 2023-12-20
Payer: COMMERCIAL

## 2023-12-20 DIAGNOSIS — N92.1 MENORRHAGIA WITH IRREGULAR CYCLE: ICD-10-CM

## 2023-12-20 PROCEDURE — 76856 US EXAM PELVIC COMPLETE: CPT

## 2023-12-20 PROCEDURE — 76830 TRANSVAGINAL US NON-OB: CPT

## 2023-12-26 ENCOUNTER — OFFICE VISIT (OUTPATIENT)
Dept: FAMILY MEDICINE CLINIC | Facility: CLINIC | Age: 43
End: 2023-12-26
Payer: COMMERCIAL

## 2023-12-26 VITALS
HEIGHT: 65 IN | RESPIRATION RATE: 16 BRPM | BODY MASS INDEX: 48.82 KG/M2 | SYSTOLIC BLOOD PRESSURE: 126 MMHG | DIASTOLIC BLOOD PRESSURE: 78 MMHG | HEART RATE: 76 BPM | TEMPERATURE: 98.1 F | WEIGHT: 293 LBS | OXYGEN SATURATION: 98 %

## 2023-12-26 DIAGNOSIS — R73.9 HYPERGLYCEMIA: ICD-10-CM

## 2023-12-26 DIAGNOSIS — F33.41 RECURRENT MAJOR DEPRESSIVE DISORDER, IN PARTIAL REMISSION (HCC): ICD-10-CM

## 2023-12-26 DIAGNOSIS — E66.01 CLASS 3 SEVERE OBESITY DUE TO EXCESS CALORIES WITH SERIOUS COMORBIDITY AND BODY MASS INDEX (BMI) OF 50.0 TO 59.9 IN ADULT (HCC): Primary | ICD-10-CM

## 2023-12-26 DIAGNOSIS — I10 ESSENTIAL HYPERTENSION: ICD-10-CM

## 2023-12-26 DIAGNOSIS — E66.01 MORBID OBESITY WITH BMI OF 50.0-59.9, ADULT (HCC): ICD-10-CM

## 2023-12-26 DIAGNOSIS — E78.49 OTHER HYPERLIPIDEMIA: ICD-10-CM

## 2023-12-26 PROCEDURE — 99214 OFFICE O/P EST MOD 30 MIN: CPT | Performed by: FAMILY MEDICINE

## 2023-12-26 RX ORDER — TIRZEPATIDE 2.5 MG/.5ML
2.5 INJECTION, SOLUTION SUBCUTANEOUS WEEKLY
Qty: 2 ML | Refills: 0 | Status: SHIPPED | OUTPATIENT
Start: 2023-12-26 | End: 2024-01-23

## 2023-12-26 NOTE — ASSESSMENT & PLAN NOTE
Discussed about low-carb diet and regular exercise.  I am going to start her on Zepbound 2.5 mg weekly.  Discussed with possible side effects.  Come back in 1 month for follow-up.

## 2023-12-26 NOTE — ASSESSMENT & PLAN NOTE
Not well-controlled.  Discussed about low-carb diet and regular exercise.  She was told Mounjaro will help with her sugar.  Come back in 1 month for follow-up.

## 2023-12-26 NOTE — PROGRESS NOTES
Name: Antonella Irving      : 1980      MRN: [id number]  Encounter Provider: Ruddy Taylor MD  Encounter Date: 2023   Encounter department: [facility] [address] [facility]    Assessment & Plan     1. Class 3 severe obesity due to excess calories with serious comorbidity and body mass index (BMI) of 50.0 to 59.9 in adult ([facility])  Assessment & Plan:  Discussed about low-carb diet and regular exercise.  I am going to start her on Zepbound 2.5 mg weekly.  Discussed with possible side effects.  Come back in 1 month for follow-up.    Orders:  -     tirzepatide (Zepbound) 2.5 mg/0.5 mL auto-injector; Inject 0.5 mL (2.5 mg total) under the skin once a week for 28 days    2. Essential hypertension  Assessment & Plan:  Well-controlled on lisinopril with hydrochlorothiazide.  Continue same.  We will continue to monitor.    Orders:  -     CBC and differential; Future  -     Comprehensive metabolic panel; Future  -     Lipid Panel with Direct LDL reflex; Future  -     TSH, 3rd generation with Free T4 reflex; Future    3. Other hyperlipidemia  Assessment & Plan:  Stable on Crestor 5 mg daily.  Continue same.  Will continue to monitor.  Come back in 1 month    Orders:  -     CBC and differential; Future  -     Comprehensive metabolic panel; Future  -     Lipid Panel with Direct LDL reflex; Future  -     TSH, 3rd generation with Free T4 reflex; Future    4. Hyperglycemia  Assessment & Plan:  Not well-controlled.  Discussed about low-carb diet and regular exercise.  She was told Mounjaro will help with her sugar.  Come back in 1 month for follow-up.    Orders:  -     Hemoglobin A1C; Future    5. Recurrent major depressive disorder, in partial remission (HCC)  Assessment & Plan:  Stable on Prozac.  Continue same.  We will continue to monitor.      6. Morbid obesity with BMI of 50.0-59.9, adult (HCC)           Subjective     She is here today for follow-up multiple medical problems.  She has been taking her  medications.  Denies any side effects.  She has blood work done recently showed A1c elevated at 6.1.  She has been having hard time trying to lose weight.  She denies any complaint today.    Hypertension  Pertinent negatives include no chest pain, headaches, palpitations or shortness of breath.   Hyperlipidemia  Pertinent negatives include no chest pain or shortness of breath.   Depression  Pertinent negatives include no abdominal pain, chest pain, chills, coughing, fever, headaches or rash.     Review of Systems   Constitutional:  Negative for chills and fever.   HENT:  Negative for trouble swallowing.    Eyes:  Negative for visual disturbance.   Respiratory:  Negative for cough and shortness of breath.    Cardiovascular:  Negative for chest pain, palpitations and leg swelling.   Gastrointestinal:  Negative for abdominal pain, constipation and diarrhea.   Endocrine: Negative for cold intolerance and heat intolerance.   Genitourinary:  Negative for difficulty urinating and dysuria.   Musculoskeletal:  Negative for gait problem.   Skin:  Negative for rash.   Neurological:  Negative for dizziness, tremors, seizures and headaches.   Hematological:  Negative for adenopathy.   Psychiatric/Behavioral:  Positive for depression. Negative for behavioral problems.        Past Medical History:   Diagnosis Date   • Asthma    • Depression    • Hyperlipemia    • Hypertension    • Obesity, morbid, BMI 50 or higher (HCC)    • Papanicolaou smear of cervix with positive high risk human papilloma virus (HPV) test     12/2023 pap negative, + HPV type 16;  COLPO:     Past Surgical History:   Procedure Laterality Date   • HERNIA REPAIR  1999     Family History   Problem Relation Age of Onset   • Hypertension Mother    • Diabetes Mother         Mellitus   • Pancreatic cancer Father    • Diabetes Father         Mellitus   • Coronary artery disease Father    • Cancer Father         Pancreatic   • No Known Problems Sister    • No Known  Problems Sister    • No Known Problems Sister    • No Known Problems Sister    • No Known Problems Brother    • No Known Problems Brother    • No Known Problems Brother    • No Known Problems Maternal Grandmother    • No Known Problems Maternal Grandfather    • Breast cancer Paternal Grandmother    • Cancer Paternal Grandmother         Breast   • No Known Problems Paternal Grandfather    • Cervical cancer Maternal Aunt    • Cancer Maternal Aunt         cervical   • Breast cancer Paternal Aunt 64   • Diabetes Family         Mellitus   • Colon cancer Neg Hx    • Ovarian cancer Neg Hx    • Uterine cancer Neg Hx      Social History     Socioeconomic History   • Marital status: Single     Spouse name: None   • Number of children: None   • Years of education: None   • Highest education level: None   Occupational History   • None   Tobacco Use   • Smoking status: Former     Current packs/day: 0.00     Types: Cigarettes     Quit date: 2005     Years since quittin.9   • Smokeless tobacco: Never   • Tobacco comments:     10 per day. No passive smoke exposure   Vaping Use   • Vaping status: Never Used   Substance and Sexual Activity   • Alcohol use: Not Currently   • Drug use: Not Currently     Frequency: 1.0 times per week     Types: Marijuana     Comment: usage in highschool   • Sexual activity: Not Currently     Partners: Male     Birth control/protection: Condom Male     Comment: last SA one year ago   Other Topics Concern   • None   Social History Narrative   • None     Social Determinants of Health     Financial Resource Strain: Not on file   Food Insecurity: Not on file   Transportation Needs: Not on file   Physical Activity: Not on file   Stress: Not on file   Social Connections: Not on file   Intimate Partner Violence: Not on file   Housing Stability: Not on file     Current Outpatient Medications on File Prior to Visit   Medication Sig   • albuterol (Ventolin HFA) 90 mcg/act inhaler Inhale 2 puffs every 6  "(six) hours as needed for wheezing   • ergocalciferol (VITAMIN D2) 50,000 units TAKE 1 CAPSULE BY MOUTH ONE TIME PER WEEK   • FLUoxetine (PROzac) 40 MG capsule TAKE 1 CAPSULE BY MOUTH EVERY DAY   • lisinopril-hydrochlorothiazide (PRINZIDE,ZESTORETIC) 20-25 MG per tablet TAKE 1 TABLET BY MOUTH EVERY DAY   • meloxicam (MOBIC) 15 mg tablet Take 1 tablet (15 mg total) by mouth daily as needed for moderate pain   • rosuvastatin (CRESTOR) 5 mg tablet TAKE 1 TABLET (5 MG TOTAL) BY MOUTH DAILY.   • fluticasone (FLONASE) 50 mcg/act nasal spray Every 12 hours (Patient not taking: Reported on 6/19/2023)   • neomycin-polymyxin-hydrocortisone (CORTISPORIN) 0.35%-10,000 units/mL-1% otic suspension Administer 4 drops into both ears 4 (four) times a day (Patient not taking: Reported on 6/19/2023)     Allergies   Allergen Reactions   • Shellfish Allergy - Food Allergy      Immunization History   Administered Date(s) Administered   • COVID-19 PFIZER VACCINE 0.3 ML IM 08/02/2021, 08/23/2021, 02/22/2022   • Fluzone Split Quad 0.25 mL 10/05/2017   • INFLUENZA 10/05/2017       Objective     /78 (BP Location: Left arm, Patient Position: Sitting, Cuff Size: Large)   Pulse 76   Temp 98.1 °F (36.7 °C) (Tympanic)   Resp 16   Ht 5' 5\" (1.651 m)   Wt (!) 156 kg (343 lb)   LMP 11/18/2023   SpO2 98%   BMI 57.08 kg/m²     Physical Exam  Vitals and nursing note reviewed.   Constitutional:       Appearance: She is well-developed.   HENT:      Head: Normocephalic and atraumatic.   Eyes:      Pupils: Pupils are equal, round, and reactive to light.   Cardiovascular:      Rate and Rhythm: Normal rate and regular rhythm.      Heart sounds: Normal heart sounds.   Pulmonary:      Effort: Pulmonary effort is normal.      Breath sounds: Normal breath sounds.   Abdominal:      General: Bowel sounds are normal.      Palpations: Abdomen is soft.   Musculoskeletal:      Cervical back: Normal range of motion and neck supple.   Lymphadenopathy:      " Cervical: No cervical adenopathy.   Skin:     General: Skin is warm.   Neurological:      Mental Status: She is alert and oriented to person, place, and time.       Ruddy Taylor MD  BMI Counseling: Body mass index is 57.08 kg/m². The BMI is above normal. Nutrition recommendations include reducing portion sizes, decreasing overall calorie intake, and 3-5 servings of fruits/vegetables daily. Exercise recommendations include moderate aerobic physical activity for 150 minutes/week.

## 2023-12-28 NOTE — RESULT ENCOUNTER NOTE
42 yo with menorrhagia and irregular cycles.  12/27/23 pelvic US: uterus 8.5 x 4.1 x 5.2 cm, heterogeneous echotexture, EML 12 mm, ovaries normal size/shape w/out masses/cysts.  Patient has visit scheduled for 1/18/24 to discuss labs/ US and for endometrial sampling.

## 2023-12-29 ENCOUNTER — PROCEDURE VISIT (OUTPATIENT)
Dept: OBGYN CLINIC | Facility: CLINIC | Age: 43
End: 2023-12-29
Payer: COMMERCIAL

## 2023-12-29 ENCOUNTER — TELEPHONE (OUTPATIENT)
Dept: FAMILY MEDICINE CLINIC | Facility: CLINIC | Age: 43
End: 2023-12-29

## 2023-12-29 VITALS
WEIGHT: 293 LBS | BODY MASS INDEX: 48.82 KG/M2 | DIASTOLIC BLOOD PRESSURE: 74 MMHG | SYSTOLIC BLOOD PRESSURE: 128 MMHG | HEIGHT: 65 IN

## 2023-12-29 DIAGNOSIS — R87.810 HUMAN PAPILLOMAVIRUS (HPV) TYPE 16 DNA DETECTED IN CERVICAL SPECIMEN: Primary | ICD-10-CM

## 2023-12-29 DIAGNOSIS — Z01.812 PRE-PROCEDURE LAB EXAM: ICD-10-CM

## 2023-12-29 LAB — SL AMB POCT URINE HCG: NEGATIVE

## 2023-12-29 PROCEDURE — 81025 URINE PREGNANCY TEST: CPT | Performed by: OBSTETRICS & GYNECOLOGY

## 2023-12-29 PROCEDURE — 88344 IMHCHEM/IMCYTCHM EA MLT ANTB: CPT | Performed by: PATHOLOGY

## 2023-12-29 PROCEDURE — 88305 TISSUE EXAM BY PATHOLOGIST: CPT | Performed by: PATHOLOGY

## 2023-12-29 PROCEDURE — 57454 BX/CURETT OF CERVIX W/SCOPE: CPT | Performed by: OBSTETRICS & GYNECOLOGY

## 2023-12-29 NOTE — PROGRESS NOTES
"   Colposcopy     Date/Time  12/29/2023 7:00 AM     Universal Protocol   Consent: Verbal consent obtained. Written consent obtained.  Risks and benefits: risks, benefits and alternatives were discussed  Consent given by: patient  Time out: Immediately prior to procedure a \"time out\" was called to verify the correct patient, procedure, equipment, support staff and site/side marked as required.  Patient understanding: patient states understanding of the procedure being performed  Patient consent: the patient's understanding of the procedure matches consent given  Procedure consent: procedure consent matches procedure scheduled  Relevant documents: relevant documents present and verified  Test results: test results available and properly labeled  Required items: required blood products, implants, devices, and special equipment available  Patient identity confirmed: verbally with patient     Performed by  Aletha Cintron MD   Authorized by  Aletha Cintron MD     Pre-procedure details      Pre-procedure timeout performed: yes      Premeds:  Ibuprofen    Prepped with: acetic acid     Indication    LSIL   Procedure Details   Procedure: Colposcopy w/ cervical biopsy and ECC      Under satisfactory analgesia the patient was prepped and draped in the dorsal lithotomy position: yes      [place] speculum was placed in the vagina: yes      Under colposcopic examination the transition zone was seen in entirety: yes      Intracervical block was performed: no      Endocervix was curetted using a Kevorkian curette: yes      Cervical biopsy performed with a cervical biopsy punch: yes      Tampon inserted: no      Monsel's solution was applied: yes      Biopsy(s): yes      Location:  12:00, 1:00    Specimen to pathology: yes     Post-procedure      Findings: White epithelium      Impression: Low grade cervical dysplasia           "

## 2024-01-02 ENCOUNTER — TELEPHONE (OUTPATIENT)
Dept: FAMILY MEDICINE CLINIC | Facility: CLINIC | Age: 44
End: 2024-01-02

## 2024-01-02 NOTE — TELEPHONE ENCOUNTER
----- Message from Antonella Irving sent at 12/30/2023 11:03 AM EST -----  Regarding: Zepbound  Contact: [phone]  Jennifer Taylor!   I wanted to follow up with our visit from Tuesday. I spoke to the pharmacy and they said the Zepbound is on waiting for approval of my physician to my insurance. I am not sure if there is anything I can do to help this along. Any help would be appreciated!     Thanks,   Antonella Irving

## 2024-01-04 PROCEDURE — 88305 TISSUE EXAM BY PATHOLOGIST: CPT | Performed by: PATHOLOGY

## 2024-01-04 PROCEDURE — 88344 IMHCHEM/IMCYTCHM EA MLT ANTB: CPT | Performed by: PATHOLOGY

## 2024-01-05 NOTE — PROGRESS NOTES
"Subjective    Patient is a 44 yo G0 who presents today to discuss the results of her colposcopy, which was initially performed in the setting of HPV 16+ with normal cytology. The colposcopy biopsies demonstrated:    Final Diagnosis   A. Endocervical, :  - High grade squamous intraepithelial lesion (SONYA 2).  - P16/Ki67 multiplex stain supports interpretation.      B. Cervix, 12:00:  - Benign endocervical tissue, fragments.      C. Cervix, 1:00:  - Benign endocervical tissue.        OB History          0    Para   0    Term   0       0    AB   0    Living   0         SAB   0    IAB   0    Ectopic   0    Multiple   0    Live Births   0           Obstetric Comments   Menarche 12  Cycles: monthly, usually Q 28d, x 5-6d, flow varies but can be very heavy (at heaviest has to change every hour, clots sometimes quarter sized) x 3-4 days; sometimes has cramps.                        Objective    Vitals:    01/10/24 1410   BP: 100/72   BP Location: Left arm   Patient Position: Sitting   Cuff Size: Standard   Weight: (!) 158 kg (347 lb 6.4 oz)   Height: 5' 5\" (1.651 m)        Physical Exam  Constitutional:       Appearance: Normal appearance.   HENT:      Head: Normocephalic and atraumatic.   Cardiovascular:      Rate and Rhythm: Normal rate.   Pulmonary:      Effort: Pulmonary effort is normal. No respiratory distress.   Musculoskeletal:         General: Normal range of motion.   Neurological:      Mental Status: She is alert.   Skin:     General: Skin is warm and dry.          Assessment    Patient Active Problem List   Diagnosis    Essential hypertension    Recurrent major depressive disorder, in partial remission (HCC)    Immunization due    Multiple environmental allergies    Asthma    Mid back pain    Chronic left-sided low back pain without sciatica    Gastroesophageal reflux disease without esophagitis    Menorrhagia with regular cycle    Screening for hyperlipidemia    Depression, recurrent (HCC)    Right " wrist pain    Anxiety    Other hyperlipidemia    Acute laryngitis    Healthcare maintenance    Encounter for screening mammogram for malignant neoplasm of breast    Viral infection, unspecified    Hyperglycemia    Vitamin D insufficiency    Otitis externa    Mild episode of recurrent major depressive disorder (HCC)    Right hip pain    Acute pain of right knee    Vitamin D deficiency    Shoulder impingement syndrome, left    Class 3 severe obesity due to excess calories with serious comorbidity and body mass index (BMI) of 50.0 to 59.9 in adult ([facility])        Plan  - Reviewed the finding of CIN2 on the endocervical sample  - Discussed the need for a diagnostic excisional procedure known as a LEEP  - Discussed the risks of the procedure, including bleeding, infection, injury to surrounding structures, and risk of a shortened cervix or  labor in future pregnancies  - Consent signed today for exam under anesthesia, LEEP, possible cold knife cone, all other indicated procedures

## 2024-01-07 PROBLEM — R05.9 COUGH: Status: RESOLVED | Noted: 2023-11-08 | Resolved: 2024-01-07

## 2024-01-10 ENCOUNTER — OFFICE VISIT (OUTPATIENT)
Dept: OBGYN CLINIC | Facility: CLINIC | Age: 44
End: 2024-01-10
Payer: COMMERCIAL

## 2024-01-10 VITALS
HEIGHT: 65 IN | WEIGHT: 293 LBS | DIASTOLIC BLOOD PRESSURE: 72 MMHG | BODY MASS INDEX: 48.82 KG/M2 | SYSTOLIC BLOOD PRESSURE: 100 MMHG

## 2024-01-10 DIAGNOSIS — N87.1 HIGH GRADE SQUAMOUS INTRAEPITHELIAL LESION (HGSIL), GRADE 2 CIN, ON BIOPSY OF CERVIX: Primary | ICD-10-CM

## 2024-01-10 PROCEDURE — 99213 OFFICE O/P EST LOW 20 MIN: CPT | Performed by: OBSTETRICS & GYNECOLOGY

## 2024-01-18 ENCOUNTER — PROCEDURE VISIT (OUTPATIENT)
Dept: OBGYN CLINIC | Facility: CLINIC | Age: 44
End: 2024-01-18
Payer: COMMERCIAL

## 2024-01-18 VITALS
HEIGHT: 65 IN | BODY MASS INDEX: 48.82 KG/M2 | DIASTOLIC BLOOD PRESSURE: 78 MMHG | WEIGHT: 293 LBS | SYSTOLIC BLOOD PRESSURE: 126 MMHG

## 2024-01-18 DIAGNOSIS — N92.1 MENORRHAGIA WITH IRREGULAR CYCLE: Primary | ICD-10-CM

## 2024-01-18 PROCEDURE — 58100 BIOPSY OF UTERUS LINING: CPT | Performed by: NURSE PRACTITIONER

## 2024-01-18 PROCEDURE — 88305 TISSUE EXAM BY PATHOLOGIST: CPT | Performed by: PATHOLOGY

## 2024-01-18 PROCEDURE — 99213 OFFICE O/P EST LOW 20 MIN: CPT | Performed by: NURSE PRACTITIONER

## 2024-01-18 NOTE — PATIENT INSTRUCTIONS
POST ENDOMETRIAL BIOPSY INSTRUCTIONS:    Over the next 3 to 5 days, call us if you experience symptoms of fever, chills, foul smelling vaginal discharge, heavy bleeding or pain that is not getting better.    Use pads only during this period of bleeding  (not tampons).  Avoid sex for 3 days, or use condoms for infection prevention.    You may take ibuprofen, aleve or tylenol for any ongoing cramping.    We will call you with result of the testing and what to do next.

## 2024-01-18 NOTE — PROGRESS NOTES
"Assessment/Plan:    1. Menorrhagia with irregular cycle  Reviewed lab results and imaging results in detail:  labs pre-menopausal; thicker endometrial lining.  Discussed the nature of her abnormal bleeding and the need to rule pathology with in the uterine lining-- endometrial hyperplasia, atypia or malignancy.  Briefly discussed management options: ABDI, POP, LN IUD.  She will consider.  If she chooses the Mirena IUD she may have it inserted at the time of her LEEP procedure with Dr. Cintron.        Subjective:      Patient ID: Antonella Irving is a 43 y.o. female.    PROBLEM VISIT  CC: menorrhagia with irregular cycle    Seen as a new patient 11/2023 for a yearly exam at which time she c/o menorrhagia with irregular cycle:  frequent and elongated.  She had 2 periods since last seeing me, only 3 days and light.  Then bled after her colposcopy bled on 1/12/24, it was very heavy and lasted 7d.    The following studies were ordered:  6/2023 CBC nl, CMP fasting glc of 115; low vit D 29.6 (taking vit D)  12/14/23 Estradiol 42.4 and FSH 8.9; HgbA1c ^ 6.1  12/27/23 pelvic US: uterus 8.5 x 4.1 x 5.2 cm, heterogeneous echotexture, EML 12 mm, ovaries normal size/shape w/out masses/cysts.    G0  BMI 57.8  She was just seen for a colposcopy  1/10/24, HGSIL, SONYA 2     The following portions of the patient's history were reviewed and updated as appropriate: allergies, current medications, past family history, past medical history, past social history, past surgical history, and problem list.    Review of Systems   Constitutional:  Negative for chills and fever.   Genitourinary:  Positive for menstrual problem.         Objective:    /78 (BP Location: Right arm, Patient Position: Sitting, Cuff Size: Standard)   Ht 5' 5\" (1.651 m)   Wt (!) 156 kg (343 lb 8 oz)   LMP 12/11/2023 (Approximate)   Breastfeeding Unknown   BMI 57.16 kg/m²     Urine HCG negative     Physical Exam  Constitutional:       General: She is not in acute " distress.     Appearance: Normal appearance. She is not ill-appearing or diaphoretic.      Comments: Bmi 57.2   HENT:      Head: Normocephalic and atraumatic.   Eyes:      Pupils: Pupils are equal, round, and reactive to light.   Pulmonary:      Effort: Pulmonary effort is normal.   Genitourinary:     General: Normal vulva.      Exam position: Lithotomy position.      Vagina: Normal.      Cervix: Normal.   Skin:     General: Skin is warm and dry.   Neurological:      General: No focal deficit present.      Mental Status: She is alert and oriented to person, place, and time.   Psychiatric:         Mood and Affect: Mood normal.         Behavior: Behavior normal.         Thought Content: Thought content normal.         Judgment: Judgment normal.

## 2024-01-18 NOTE — PROGRESS NOTES
Endometrial biopsy    Date/Time: 1/18/2024 7:20 AM    Performed by: MICHAELLE Quijano  Authorized by: MICHAELLE Quijano  Universal Protocol:  Consent: Written consent obtained.  Risks and benefits: risks, benefits and alternatives were discussed  Consent given by: patient  Patient understanding: patient states understanding of the procedure being performed  Patient consent: the patient's understanding of the procedure matches consent given  Procedure consent: procedure consent matches procedure scheduled  Relevant documents: relevant documents present and verified  Test results: test results available and properly labeled  Site marked: the operative site was marked  Radiology Images displayed and confirmed. If images not available, report reviewed: imaging studies available  Patient identity confirmed: verbally with patient    Indication:     Indications: Other disorder of menstruation and other abnormal bleeding from female genital tract    Pre-procedure:     Premeds:  Ibuprofen  Procedure:     Procedure: endometrial biopsy with Pipelle      A bivalve speculum was placed in the vagina: yes      Cervix cleaned and prepped: yes      A paracervical block was performed: no      An intracervical block was performed: no      The cervix was dilated: no      Uterus sounded: yes      Uterus sound depth (cm):  8    Specimen collected: specimen collected and sent to pathology      Patient tolerated procedure well with no complications: yes    Findings:     Cervix: normal    Comments:     Procedure comments:  Post ebx instructions provided.  Will inform patient of results when available.

## 2024-01-19 ENCOUNTER — RA CDI HCC (OUTPATIENT)
Dept: OTHER | Facility: HOSPITAL | Age: 44
End: 2024-01-19

## 2024-01-19 NOTE — PROGRESS NOTES
HCC coding opportunities          Chart Reviewed number of suggestions sent to Provider: 3   J45.20  E66.01  Depression    Patients Insurance        Commercial Insurance: [employer]

## 2024-01-23 DIAGNOSIS — C54.1 ENDOMETRIAL CARCINOMA (HCC): Primary | ICD-10-CM

## 2024-01-23 PROCEDURE — 88305 TISSUE EXAM BY PATHOLOGIST: CPT | Performed by: PATHOLOGY

## 2024-01-23 NOTE — PROGRESS NOTES
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 Discussed that a referral to GYN Oncology was placed to discuss management of her biopsy results, as well as management of her CIN2. Patient expressed understanding.    Aletha Cintron MD  1/23/2024  4:06 PM    "

## 2024-01-24 ENCOUNTER — TELEPHONE (OUTPATIENT)
Dept: HEMATOLOGY ONCOLOGY | Facility: CLINIC | Age: 44
End: 2024-01-24

## 2024-01-24 ENCOUNTER — TELEPHONE (OUTPATIENT)
Dept: OBGYN CLINIC | Facility: CLINIC | Age: 44
End: 2024-01-24

## 2024-01-24 DIAGNOSIS — C54.1 ENDOMETRIAL CANCER (HCC): Primary | ICD-10-CM

## 2024-01-24 NOTE — TELEPHONE ENCOUNTER
Spoke to patient to let her know that I am aware of her endometrial biopsy results.  She is already aware of her endometrial tissue pathology findings which revealed a few foci of Endometrioid carcinoma FIGO 1, in background EIN/AH.  A referral was already placed to GYN Oncology and patient is in process of making an appt with them.

## 2024-01-24 NOTE — TELEPHONE ENCOUNTER
Antonella calling in response to a referral that was received for patient to establish care with Gynecologic Oncology.     Outreach was made to complete patient's intake questionnaire .    Patient's intake questionnaire was reviewed and complete. Patient's intake has been sent to the team for clinical review.

## 2024-01-24 NOTE — TELEPHONE ENCOUNTER
I called Antonella in response to a referral that was received for patient to establish care with Gynecologic Oncology.     Outreach was made to complete patient's intake questionnaire .    I left a voicemail explaining the reason for my call and advised patient to call [facility] at [phone].  Another attempt will be made to contact patient.

## 2024-01-25 NOTE — PROGRESS NOTES
AsthmaNoted 6/17/2015   [J45 909]      Depression, recurrent (HCC)Noted 2/10/2020  [F33 9]    NOT on the BPA- please assess using MEAT for 2023 billing    [facility] 75  coding opportunities          Chart Reviewed number of suggestions sent to Provider: 2     Patients Insurance        Commercial Insurance: [address] [place] [id number] Monica Brannon MA spoke to the patient and informed her that we had not received the records or disc for her appt.  She is to obtain that and bring with her.  KYLE

## 2024-01-26 ENCOUNTER — TELEPHONE (OUTPATIENT)
Dept: HEMATOLOGY ONCOLOGY | Facility: CLINIC | Age: 44
End: 2024-01-26

## 2024-01-26 ENCOUNTER — OFFICE VISIT (OUTPATIENT)
Dept: FAMILY MEDICINE CLINIC | Facility: CLINIC | Age: 44
End: 2024-01-26
Payer: COMMERCIAL

## 2024-01-26 VITALS
WEIGHT: 293 LBS | OXYGEN SATURATION: 97 % | RESPIRATION RATE: 16 BRPM | HEIGHT: 65 IN | DIASTOLIC BLOOD PRESSURE: 70 MMHG | BODY MASS INDEX: 48.82 KG/M2 | HEART RATE: 79 BPM | SYSTOLIC BLOOD PRESSURE: 118 MMHG

## 2024-01-26 DIAGNOSIS — F33.9 DEPRESSION, RECURRENT (HCC): ICD-10-CM

## 2024-01-26 DIAGNOSIS — E66.01 CLASS 3 SEVERE OBESITY DUE TO EXCESS CALORIES WITH SERIOUS COMORBIDITY AND BODY MASS INDEX (BMI) OF 50.0 TO 59.9 IN ADULT (HCC): Primary | ICD-10-CM

## 2024-01-26 DIAGNOSIS — I10 ESSENTIAL HYPERTENSION: ICD-10-CM

## 2024-01-26 DIAGNOSIS — C54.1 ENDOMETRIAL CARCINOMA (HCC): ICD-10-CM

## 2024-01-26 DIAGNOSIS — R73.9 HYPERGLYCEMIA: ICD-10-CM

## 2024-01-26 DIAGNOSIS — E78.49 OTHER HYPERLIPIDEMIA: ICD-10-CM

## 2024-01-26 PROCEDURE — 99214 OFFICE O/P EST MOD 30 MIN: CPT | Performed by: FAMILY MEDICINE

## 2024-01-26 RX ORDER — TIRZEPATIDE 2.5 MG/.5ML
2.5 INJECTION, SOLUTION SUBCUTANEOUS WEEKLY
COMMUNITY
Start: 2024-01-23

## 2024-01-26 NOTE — TELEPHONE ENCOUNTER
I called Antonella in response to a referral that was received for patient to establish care with Gynecologic Oncology.     Outreach was made to schedule a consultation.    I left a voicemail explaining the reason for my call and advised patient to call [facility] at [phone].  Another attempt will be made to contact patient.    7 day is needed.

## 2024-01-26 NOTE — PROGRESS NOTES
Name: Antonella Irving      : 1980      MRN: [id number]  Encounter Provider: Ruddy Taylor MD  Encounter Date: 2024   Encounter department:  [facility]  PRIMARY CARE    Assessment & Plan     1. Class 3 severe obesity due to excess calories with serious comorbidity and body mass index (BMI) of 50.0 to 59.9 in adult (HCC)  Assessment & Plan:  Patient will hold Zepbound for now due to recently being diagnosed with endometrial carcinoma. She lost 2 pounds since her last visit. She will continue with diet management and exercise for weight loss now. Return in 3 months to monitor progress and discuss the option of Zepbound again.      2. Essential hypertension  Assessment & Plan:  Blood pressure is well-controlled today at 118/70. Continue with lisinopril-hydrochlorothiazide.       3. Other hyperlipidemia  Assessment & Plan:  .  Continue same.  Will continue to monitor.    Orders:  -     CBC and differential; Future  -     Comprehensive metabolic panel; Future  -     Lipid Panel with Direct LDL reflex; Future  -     TSH, 3rd generation with Free T4 reflex; Future    4. Hyperglycemia  -     Hemoglobin A1C; Future    5. Depression, recurrent (HCC)  Assessment & Plan:  Stable on the second.  Continue same.  We will continue to monitor.      6. 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  Continue to follow-up with GYN and oncology.             Kervin Berman is a 43 year old female with a past medical history of hypertension and hyperlipidemia presenting to the office today for a 1 month weight loss follow-up. She has lost 2 pounds since her last visit through diet and exercise. She has Zepbound but has not yet taken it due to having an endometrial biopsy that showed carcinoma. She is following with OBGYN and oncology. Her blood pressure is well-controlled today at 118/70.      Review  of Systems   Constitutional:  Negative for chills and fever.   HENT:  Negative for ear pain and sore throat.    Eyes:  Negative for pain and visual disturbance.   Respiratory:  Negative for cough, shortness of breath and wheezing.    Cardiovascular:  Negative for chest pain and palpitations.   Gastrointestinal:  Negative for abdominal pain, nausea and vomiting.   Genitourinary:  Negative for difficulty urinating and dysuria.   Musculoskeletal:  Negative for arthralgias and back pain.   Skin:  Negative for color change and rash.   Neurological:  Negative for dizziness and headaches.   All other systems reviewed and are negative.      Past Medical History:   Diagnosis Date   • Asthma    • Depression    • Hyperlipemia    • Hypertension    • Obesity, morbid, BMI 50 or higher (HCC)    • Papanicolaou smear of cervix with positive high risk human papilloma virus (HPV) test     12/2023 pap negative, + HPV type 16;  COLPO:     Past Surgical History:   Procedure Laterality Date   • HERNIA REPAIR  1999     Family History   Problem Relation Age of Onset   • Hypertension Mother    • Diabetes Mother         Mellitus   • Pancreatic cancer Father    • Diabetes Father         Mellitus   • Coronary artery disease Father    • Cancer Father         Pancreatic   • No Known Problems Sister    • No Known Problems Sister    • No Known Problems Sister    • No Known Problems Sister    • No Known Problems Brother    • No Known Problems Brother    • No Known Problems Brother    • No Known Problems Maternal Grandmother    • No Known Problems Maternal Grandfather    • Breast cancer Paternal Grandmother    • Cancer Paternal Grandmother         Breast   • No Known Problems Paternal Grandfather    • Cervical cancer Maternal Aunt    • Cancer Maternal Aunt         cervical   • Breast cancer Paternal Aunt 64   • Diabetes Family         Mellitus   • Colon cancer Neg Hx    • Ovarian cancer Neg Hx    • Uterine cancer Neg Hx      Social History      Socioeconomic History   • Marital status: Single     Spouse name: None   • Number of children: None   • Years of education: None   • Highest education level: None   Occupational History   • None   Tobacco Use   • Smoking status: Former     Current packs/day: 0.00     Types: Cigarettes     Quit date: 2005     Years since quittin.0   • Smokeless tobacco: Never   • Tobacco comments:     10 per day. No passive smoke exposure   Vaping Use   • Vaping status: Never Used   Substance and Sexual Activity   • Alcohol use: Not Currently   • Drug use: Not Currently     Frequency: 1.0 times per week     Types: Marijuana     Comment: usage in [place]   • Sexual activity: Not Currently     Partners: Male     Birth control/protection: Condom Male     Comment: last SA one year ago   Other Topics Concern   • None   Social History Narrative   • None     Social Determinants of Health     Financial Resource Strain: Not on file   Food Insecurity: Not on file   Transportation Needs: Not on file   Physical Activity: Not on file   Stress: Not on file   Social Connections: Not on file   Intimate Partner Violence: Not on file   Housing Stability: Not on file     Current Outpatient Medications on File Prior to Visit   Medication Sig   • albuterol (Ventolin HFA) 90 mcg/act inhaler Inhale 2 puffs every 6 (six) hours as needed for wheezing   • ergocalciferol (VITAMIN D2) 50,000 units TAKE 1 CAPSULE BY MOUTH ONE TIME PER WEEK   • FLUoxetine (PROzac) 40 MG capsule TAKE 1 CAPSULE BY MOUTH EVERY DAY   • fluticasone (FLONASE) 50 mcg/act nasal spray Every 12 hours Prn   • lisinopril-hydrochlorothiazide (PRINZIDE,ZESTORETIC) 20-25 MG per tablet TAKE 1 TABLET BY MOUTH EVERY DAY   • meloxicam (MOBIC) 15 mg tablet Take 1 tablet (15 mg total) by mouth daily as needed for moderate pain   • neomycin-polymyxin-hydrocortisone (CORTISPORIN) 0.35%-10,000 units/mL-1% otic suspension Administer 4 drops into both ears 4 (four) times a day   •  "rosuvastatin (CRESTOR) 5 mg tablet TAKE 1 TABLET (5 MG TOTAL) BY MOUTH DAILY.   • Zepbound 2.5 MG/0.5ML auto-injector Inject 2.5 mg under the skin once a week     Allergies   Allergen Reactions   • Shellfish Allergy - Food Allergy      Immunization History   Administered Date(s) Administered   • COVID-19 PFIZER VACCINE 0.3 ML IM 08/02/2021, 08/23/2021, 02/22/2022   • COVID-19 Pfizer vac (Rilye-sucrose, gray cap) 12 yr+ IM 02/22/2022   • Fluzone Split Quad 0.25 mL 10/05/2017   • INFLUENZA 10/05/2017       Objective     /70 (BP Location: Left arm, Patient Position: Sitting, Cuff Size: Large)   Pulse 79   Resp 16   Ht 5' 5\" (1.651 m)   Wt (!) 155 kg (341 lb)   LMP 12/11/2023 (Approximate)   SpO2 97%   BMI 56.75 kg/m²     Physical Exam  Vitals and nursing note reviewed.   Constitutional:       General: She is not in acute distress.     Appearance: Normal appearance.   HENT:      Head: Normocephalic and atraumatic.      Nose: Nose normal.   Eyes:      General: No scleral icterus.     Extraocular Movements: Extraocular movements intact.      Conjunctiva/sclera: Conjunctivae normal.   Cardiovascular:      Rate and Rhythm: Normal rate and regular rhythm.      Pulses: Normal pulses.      Heart sounds: Normal heart sounds. No murmur heard.     No friction rub. No gallop.   Pulmonary:      Effort: Pulmonary effort is normal. No respiratory distress.      Breath sounds: Normal breath sounds. No wheezing, rhonchi or rales.   Musculoskeletal:         General: Normal range of motion.      Cervical back: Normal range of motion.   Skin:     General: Skin is warm and dry.      Capillary Refill: Capillary refill takes less than 2 seconds.   Neurological:      General: No focal deficit present.      Mental Status: She is alert.   Psychiatric:         Mood and Affect: Mood normal.       Ruddy Taylor MD    "

## 2024-01-26 NOTE — ASSESSMENT & PLAN NOTE
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  Continue to follow-up with GYN and oncology.

## 2024-01-26 NOTE — ASSESSMENT & PLAN NOTE
Patient will hold Zepbound for now due to recently being diagnosed with endometrial carcinoma. She lost 2 pounds since her last visit. She will continue with diet management and exercise for weight loss now. Return in 3 months to monitor progress and discuss the option of Zepbound again.

## 2024-01-29 ENCOUNTER — CONSULT (OUTPATIENT)
Dept: GYNECOLOGIC ONCOLOGY | Facility: CLINIC | Age: 44
End: 2024-01-29
Payer: COMMERCIAL

## 2024-01-29 ENCOUNTER — TELEPHONE (OUTPATIENT)
Dept: GENETICS | Facility: CLINIC | Age: 44
End: 2024-01-29

## 2024-01-29 ENCOUNTER — PATIENT OUTREACH (OUTPATIENT)
Dept: CASE MANAGEMENT | Facility: HOSPITAL | Age: 44
End: 2024-01-29

## 2024-01-29 VITALS
BODY MASS INDEX: 48.82 KG/M2 | WEIGHT: 293 LBS | HEART RATE: 88 BPM | SYSTOLIC BLOOD PRESSURE: 120 MMHG | HEIGHT: 65 IN | TEMPERATURE: 97.4 F | OXYGEN SATURATION: 96 % | RESPIRATION RATE: 16 BRPM | DIASTOLIC BLOOD PRESSURE: 84 MMHG

## 2024-01-29 DIAGNOSIS — C54.1 ENDOMETRIAL CARCINOMA (HCC): Primary | ICD-10-CM

## 2024-01-29 DIAGNOSIS — N87.1 DYSPLASIA OF CERVIX, HIGH GRADE CIN 2: ICD-10-CM

## 2024-01-29 PROCEDURE — 3079F DIAST BP 80-89 MM HG: CPT | Performed by: OBSTETRICS & GYNECOLOGY

## 2024-01-29 PROCEDURE — 99205 OFFICE O/P NEW HI 60 MIN: CPT | Performed by: OBSTETRICS & GYNECOLOGY

## 2024-01-29 PROCEDURE — 3074F SYST BP LT 130 MM HG: CPT | Performed by: OBSTETRICS & GYNECOLOGY

## 2024-01-29 RX ORDER — ACETAMINOPHEN 325 MG/1
975 TABLET ORAL ONCE
OUTPATIENT
Start: 2024-01-29 | End: 2024-01-29

## 2024-01-29 RX ORDER — CELECOXIB 200 MG/1
200 CAPSULE ORAL ONCE
OUTPATIENT
Start: 2024-01-29 | End: 2024-01-29

## 2024-01-29 RX ORDER — GABAPENTIN 100 MG/1
100 CAPSULE ORAL ONCE
OUTPATIENT
Start: 2024-01-29 | End: 2024-01-29

## 2024-01-29 RX ORDER — ENOXAPARIN SODIUM 100 MG/ML
40 INJECTION SUBCUTANEOUS
OUTPATIENT
Start: 2024-01-29 | End: 2024-01-30

## 2024-01-29 RX ORDER — METRONIDAZOLE 500 MG/100ML
500 INJECTION, SOLUTION INTRAVENOUS EVERY 8 HOURS
OUTPATIENT
Start: 2024-01-29

## 2024-01-29 NOTE — TELEPHONE ENCOUNTER
I called Antonella to schedule a new patient appointment with the Cancer Risk and Genetics Program.      Outcome:  Genetics appointment scheduled for 2/8    Personal/Family History Related to Appointment:  Endometrial ca. Fhx of pancreatica (father), uterine ca, breast ca, bone ca, stomach ca. Non-Anglican.    History of Genetic Testing:  Patient reports no personal or family history of genetic testing    Genetics Family History Questionnaire:  Unable to confirm

## 2024-01-29 NOTE — ASSESSMENT & PLAN NOTE
The patient has a new diagnosis of endometrial adenocarcinoma by her most recent biopsy.  This appears to be early stage by clinical exam.  We have discussed treatment options for this disease including chemotherapy, radiation therapy, and hormonal management.  I have stated that medical evidence indicates that at this point to surgical management is her best option. I have discussed also the risks and benefits of lymph node biopsy at the time of surgery.  We have also discussed maintaining ovaries in place given the patient's young age however the patient does have a strong family history of multiple malignancies and desires the most aggressive treatment.  We have discussed open versus laparoscopic versus robotic approach and have recommended the following:    Robotically assisted total laparoscopic hysterectomy bilateral salpingo-oophorectomy with sentinel lymph node biopsy.  Have discussed risks and benefits of the procedure including bleeding requiring transfusion infection, infection, damage to local structures including bowel bladder ureter and other local organs.  We discussed the risk of deep venous thrombosis.  All of these complications are in the 2-4% range of likelihood.  An open procedure may be required.  The patient understands the risks and benefits of the procedure and has signed an informed consent.  I personally signed the consent form with her.  She does understand that further treatment including chemotherapy radiation therapy or hormones may be required based on the final postoperative pathologic diagnosis and staging.  Standard preoperative testing including type and screen is CBC CPM P chest x-ray and EKG will be ordered.  Any appropriate consultations for preoperative evaluation will also be ordered.  Overall consultation took 60 min with greater than 50% in dedicated toward discussion time.     Given the patient's young age genetics consult was recommended.  Also given these cervical  dysplasia this should be taken care of with this present procedure.

## 2024-01-29 NOTE — PROGRESS NOTES
Assessment/Plan:    Problem List Items Addressed This Visit       Endometrial carcinoma (HCC) - Primary     The patient has a new diagnosis of endometrial adenocarcinoma by her most recent biopsy.  This appears to be early stage by clinical exam.  We have discussed treatment options for this disease including chemotherapy, radiation therapy, and hormonal management.  I have stated that medical evidence indicates that at this point to surgical management is her best option. I have discussed also the risks and benefits of lymph node biopsy at the time of surgery.  We have also discussed maintaining ovaries in place given the patient's young age however the patient does have a strong family history of multiple malignancies and desires the most aggressive treatment.  We have discussed open versus laparoscopic versus robotic approach and have recommended the following:    Robotically assisted total laparoscopic hysterectomy bilateral salpingo-oophorectomy with sentinel lymph node biopsy.  Have discussed risks and benefits of the procedure including bleeding requiring transfusion infection, infection, damage to local structures including bowel bladder ureter and other local organs.  We discussed the risk of deep venous thrombosis.  All of these complications are in the 2-4% range of likelihood.  An open procedure may be required.  The patient understands the risks and benefits of the procedure and has signed an informed consent.  I personally signed the consent form with her.  She does understand that further treatment including chemotherapy radiation therapy or hormones may be required based on the final postoperative pathologic diagnosis and staging.  Standard preoperative testing including type and screen is CBC CPM P chest x-ray and EKG will be ordered.  Any appropriate consultations for preoperative evaluation will also be ordered.  Overall consultation took 60 min with greater than 50% in dedicated toward discussion  time.     Given the patient's young age genetics consult was recommended.  Also given these cervical dysplasia this should be taken care of with this present procedure.         Relevant Orders    Ambulatory Referral to Oncology Genetics    Case request operating room: HYSTERECTOMY LAPAROSCOPIC TOTAL (LTH) bilateral salpingo-oophorectomy sentinel lymph node mapping and biopsy W/ ROBOTICS (Completed)    Type and screen    Comprehensive metabolic panel    CBC and differential    HEMOGLOBIN A1C W/ EAG ESTIMATION    EKG 12 lead    XR chest pa & lateral     Other Visit Diagnoses       Dysplasia of cervix, high grade SONYA 2        Relevant Orders    Case request operating room: HYSTERECTOMY LAPAROSCOPIC TOTAL (LTH) bilateral salpingo-oophorectomy sentinel lymph node mapping and biopsy W/ ROBOTICS (Completed)    Type and screen    Comprehensive metabolic panel    CBC and differential    HEMOGLOBIN A1C W/ EAG ESTIMATION    EKG 12 lead    XR chest pa & lateral                CHIEF COMPLAINT: Newly diagnosed endometrial cancer and cervical dysplasia      Subjective:     Problem:  Cancer Staging   No matching staging information was found for the patient.      Previous therapy:  Oncology History   Endometrial carcinoma (HCC)   2024 Initial Diagnosis    Endometrial carcinoma (HCC)     2024 -  Cancer Staged    Staging form: Corpus Uteri - Carcinoma, AJCC 8th Edition  - Clinical stage from 2024: FIGO Stage I (cT1, cM0) - Signed by Faustino Millan MD on 2024  Histopathologic type: Endometrioid adenocarcinoma, NOS  Stage prefix: Initial diagnosis  Histologic grade (G): G1  Histologic grading system: 3 grade system             Patient ID: Antonella Irving is a 43 y.o. female      Patient is a pleasant 43-year-old  0 seen in consultation for evaluation and management of newly diagnosed endometrial cancer.    Patient was in her usual state of health and noted chronic irregular uterine bleeding.  A pelvic  ultrasound was ordered and reveals the following:    UTERUS:  The uterus is anteverted in position, measuring 8.5 x 4.1 x 5.2 cm.  Myometrium demonstrates a heterogeneous echotexture. No discrete fibroids identified.  The cervix appears within normal limits.     ENDOMETRIUM:  The endometrial echo complex has an AP caliber of 12.0 mm.  This is within normal limits for a premenopausal female. Trace complex fluid is suggested within the endometrial cavity, most compatible with blood products.     OVARIES/ADNEXA:  Right ovary:  2.2 x 1.4 x 1.4 cm. 2.2 mL.  Left ovary:  3.0 x 1.8 x 1.8 cm. 5.1 mL.  Ovarian Doppler flow is within normal limits.  The right ovary is suboptimally visualized. Left ovary is only visualized on transabdominal imaging. No suspicious ovarian abnormalities are identified within these confines.     OTHER:  No free fluid or loculated fluid collections.     IMPRESSION:     1. Endometrial echo complex measures 12 mm in AP caliber, within normal limits for a premenopausal female. Trace blood products are suggested within the endometrial cavity.     2. Heterogeneous myometrial echotexture. Finding is nonspecific but can be seen in the setting of adenomyosis. No discrete fibroids are identified.      Endometrial biopsy was performed which reveals the following:  Final Diagnosis  A. Endometrium, :  -Few foci of Endometrioid carcinoma FIGO 1, in background EIN/AH       A few months ago.  This indicated normal cytology but high risk HPV 16 noted.  Colposcopy and biopsy was performed and reveals the following:  Final Diagnosis  A. Endocervical, :  - High grade squamous intraepithelial lesion (SONYA 2).  - P16/Ki67 multiplex stain supports interpretation.      B. Cervix, 12:00:  - Benign endocervical tissue, fragments.      C. Cervix, 1:00:  - Benign endocervical tissue.     Today, the patient is doing well.  She denies significant abdominal pain, pelvic pain, nausea, vomiting, constipation, diarrhea, fevers,  chills,.  The patient is not interested in childbearing.  She has significant family history of malignancy including a father with pancreatic cancer and other family members with endometrial breast cancers.  He has never been tested nor has anyone in the family for genetics.               Review of Systems    Current Outpatient Medications   Medication Sig Dispense Refill    albuterol (Ventolin HFA) 90 mcg/act inhaler Inhale 2 puffs every 6 (six) hours as needed for wheezing 18 g 1    ergocalciferol (VITAMIN D2) 50,000 units TAKE 1 CAPSULE BY MOUTH ONE TIME PER WEEK 12 capsule 1    FLUoxetine (PROzac) 40 MG capsule TAKE 1 CAPSULE BY MOUTH EVERY DAY 90 capsule 1    lisinopril-hydrochlorothiazide (PRINZIDE,ZESTORETIC) 20-25 MG per tablet TAKE 1 TABLET BY MOUTH EVERY DAY 90 tablet 1    rosuvastatin (CRESTOR) 5 mg tablet TAKE 1 TABLET (5 MG TOTAL) BY MOUTH DAILY. 90 tablet 1    fluticasone (FLONASE) 50 mcg/act nasal spray Every 12 hours Prn (Patient not taking: Reported on 1/29/2024)      meloxicam (MOBIC) 15 mg tablet Take 1 tablet (15 mg total) by mouth daily as needed for moderate pain (Patient not taking: Reported on 1/29/2024) 30 tablet 1    neomycin-polymyxin-hydrocortisone (CORTISPORIN) 0.35%-10,000 units/mL-1% otic suspension Administer 4 drops into both ears 4 (four) times a day (Patient not taking: Reported on 1/29/2024) 10 mL 0    Zepbound 2.5 MG/0.5ML auto-injector Inject 2.5 mg under the skin once a week (Patient not taking: Reported on 1/29/2024)       No current facility-administered medications for this visit.       Allergies   Allergen Reactions    Shellfish Allergy - Food Allergy        Past Medical History:   Diagnosis Date    Asthma     Depression     Hyperlipemia     Hypertension     Obesity, morbid, BMI 50 or higher (HCC)     Papanicolaou smear of cervix with positive high risk human papilloma virus (HPV) test     12/2023 pap negative, + HPV type 16;  COLPO:       Past Surgical History:   Procedure  "Laterality Date    HERNIA REPAIR         OB History          0    Para   0    Term   0       0    AB   0    Living   0         SAB   0    IAB   0    Ectopic   0    Multiple   0    Live Births   0           Obstetric Comments   Menarche 12  Cycles: monthly, usually Q 28d, x 5-6d, flow varies but can be very heavy (at heaviest has to change every hour, clots sometimes quarter sized) x 3-4 days; sometimes has cramps.                 Family History   Problem Relation Age of Onset    Hypertension Mother     Diabetes Mother         Mellitus    Pancreatic cancer Father     Diabetes Father         Mellitus    Coronary artery disease Father     Cancer Father         Pancreatic    No Known Problems Sister     No Known Problems Sister     No Known Problems Sister     No Known Problems Sister     No Known Problems Brother     No Known Problems Brother     No Known Problems Brother     No Known Problems Maternal Grandmother     No Known Problems Maternal Grandfather     Breast cancer Paternal Grandmother     Cancer Paternal Grandmother         Breast    No Known Problems Paternal Grandfather     Cervical cancer Maternal Aunt     Cancer Maternal Aunt         cervical    Breast cancer Paternal Aunt 64    Diabetes Family         Mellitus    Colon cancer Neg Hx     Ovarian cancer Neg Hx     Uterine cancer Neg Hx        The following portions of the patient's history were reviewed and updated as appropriate: allergies, current medications, past family history, past medical history, past social history, past surgical history, and problem list.      Objective:    Blood pressure 120/84, pulse 88, temperature (!) 97.4 °F (36.3 °C), temperature source Temporal, resp. rate 16, height 5' 5\" (1.651 m), weight (!) 156 kg (344 lb), SpO2 96%, unknown if currently breastfeeding.  Body mass index is 57.24 kg/m².    Physical Exam      No results found for: \"\"  Lab Results   Component Value Date    WBC 7.31 2023    HGB " "13.3 06/01/2023    HCT 39.9 06/01/2023    MCV 88 06/01/2023     06/01/2023     Lab Results   Component Value Date    K 4.1 06/01/2023     06/01/2023    CO2 26 06/01/2023    BUN 18 06/01/2023    CREATININE 0.98 06/01/2023    GLUF 115 (H) 06/01/2023    CALCIUM 9.4 06/01/2023    AST 22 06/01/2023    ALT 24 06/01/2023    ALKPHOS 46 06/01/2023    EGFR 70 06/01/2023        Trend:  No results found for: \"\"     "

## 2024-01-31 ENCOUNTER — DOCUMENTATION (OUTPATIENT)
Dept: GENETICS | Facility: CLINIC | Age: 44
End: 2024-01-31

## 2024-02-05 ENCOUNTER — TELEPHONE (OUTPATIENT)
Dept: GYNECOLOGIC ONCOLOGY | Facility: CLINIC | Age: 44
End: 2024-02-05

## 2024-02-05 ENCOUNTER — TELEPHONE (OUTPATIENT)
Dept: HEMATOLOGY ONCOLOGY | Facility: CLINIC | Age: 44
End: 2024-02-05

## 2024-02-05 NOTE — TELEPHONE ENCOUNTER
Patient Call    Who are you speaking with? Patient    If it is not the patient, are they listed on an active communication consent form? N/A   What is the reason for this call? Patient calling stating she missed a call from Dr. Smallwood team.  She is waiting for a call regarding surgery    Does this require a call back? yes   If a call back is required, please list best call back number [phone]   If a call back is required, advise that a message will be forwarded to their care team and someone will return their call as soon as possible.   Did you relay this information to the patient? yes

## 2024-02-08 ENCOUNTER — CLINICAL SUPPORT (OUTPATIENT)
Dept: GENETICS | Facility: CLINIC | Age: 44
End: 2024-02-08
Payer: COMMERCIAL

## 2024-02-08 ENCOUNTER — DOCUMENTATION (OUTPATIENT)
Dept: GENETICS | Facility: CLINIC | Age: 44
End: 2024-02-08

## 2024-02-08 DIAGNOSIS — Z80.0 FAMILY HISTORY OF PANCREATIC CANCER: ICD-10-CM

## 2024-02-08 DIAGNOSIS — C54.1 ENDOMETRIAL CARCINOMA (HCC): Primary | ICD-10-CM

## 2024-02-08 DIAGNOSIS — Z80.0 FAMILY HISTORY OF STOMACH CANCER: ICD-10-CM

## 2024-02-08 DIAGNOSIS — Z80.49 FAMILY HISTORY OF UTERINE CANCER: ICD-10-CM

## 2024-02-08 DIAGNOSIS — Z80.3 FAMILY HISTORY OF BREAST CANCER: ICD-10-CM

## 2024-02-08 PROCEDURE — 36415 COLL VENOUS BLD VENIPUNCTURE: CPT

## 2024-02-08 NOTE — PROGRESS NOTES
Pre-Test Genetic Counseling Consult Note    Patient Name: Antonella Irving   /Age: [birth year]//43 y.o.  Referring Provider: Faustino Millan MD    Date of Service: 2024  Genetic Counselor: Sho Avitia MS, [facility] [place]  Interpretation Services: None  Location: In-person consult at [place]  Length of Visit: 60 Minutes    Antonella was referred to the [facility] Cancer Risk and Genetic Assessment Program due to her family history of pancreatic, uterine, and breast cancer and recent diagnosis of uterine cancer . she presents today to discuss the possibility of a hereditary cancer syndrome, options for genetic testing, and implications for her and her family.        Cancer History and Treatment:   Personal History:   Oncology History   Endometrial carcinoma (HCC)   2024 Initial Diagnosis    Endometrial carcinoma (HCC)     2024 -  Cancer Staged    Staging form: Corpus Uteri - Carcinoma, AJCC 8th Edition  - Clinical stage from 2024: FIGO Stage I (cT1, cM0) - Signed by Faustino Millan MD on 2024  Histopathologic type: Endometrioid adenocarcinoma, NOS  Stage prefix: Initial diagnosis  Histologic grade (G): G1  Histologic grading system: 3 grade system         Screening Hx:   Breast:  Breast Imagin23  Breast Density: Almost entirely fatty    Colon:  Colonoscopy: none    Gynecologic:  LTH/BSO scheduled for     Skin:  No current screening    Other screening: none    Reproductive History  Age at menarche: 12  Age at first live birth: Nulliparous  Menopause: Premenopausal  Hormone replacement: none    Medical and Surgical History  Pertinent surgical history:   Past Surgical History:   Procedure Laterality Date    HERNIA REPAIR        Pertinent medical history:  Past Medical History:   Diagnosis Date    Asthma     Depression     Hyperlipemia     Hypertension     Obesity, morbid, BMI 50 or higher (HCC)     Papanicolaou smear of cervix with positive high risk human papilloma virus (HPV) test   "   12/2023 pap negative, + HPV type 16;  COLPO:         Other History:  Height:   Ht Readings from Last 1 Encounters:   01/29/24 5' 5\" (1.651 m)     Weight:   Wt Readings from Last 1 Encounters:   01/29/24 (!) 156 kg (344 lb)       Relevant Family History   Patient reports no Ashkenazi Hinduism ancestry.     Maternal Family History:  Aunt (d.70s) history of uterine cancer (dx 70)  No other reported history of cancer    Paternal Family History:   Father (d.65) history of pancreatic cancer (dx 65)   Aunt (late 60s) history of breast cancer (dx mid-50s)   Uncle (60s) history of skin findings removed from face that are cancerous   Grandmother (d.84) history of breast cancer (dx 64) and stomach cancer (dx 84)    Please refer to the scanned pedigree in the Media Tab for a complete family history     *All history is reported as provided by the patient; records are not available for review, except where indicated.     Assessment:  We discussed sporadic, familial and hereditary cancer.  We also discussed the many factors that influence our risk for cancer such as age, environmental exposures, lifestyle choices and family history.      We reviewed the indications suggestive of a hereditary predisposition to cancer.    Genetic testing is indicated for Antonella based on the following criteria: Meets NCCN V2.2024 Testing Criteria for Pancreatic Cancer Susceptibility Genes: family history of a first-degree relative diagnosed with pancreatic cancer  Meets NCCN V1.2023 Testing Criteria for the Evaluation of Weiss syndrome: personal history of uterine cancer diagnosed under 50    The risks, benefits, and limitations of genetic testing were reviewed with the patient, as well as genetic discrimination laws, and possible test results (positive, negative, variants of uncertain significance) and their clinical implications. If positive for a mutation, options for managing cancer risk including increased surveillance, chemoprevention, and in " some cases prophylactic surgery were discussed. Antonella was informed that if a hereditary cancer syndrome was identified in her, first degree relatives (parents, siblings, and children) have a chance of also inheriting the condition. Genetic testing would allow for predictive genetic testing in other relatives, who may also be at risk depending on their degree of relation.     Billing:  Most individuals pay <$100 for hereditary cancer genetic testing. If insurance covers the cost of the testing, individuals may still pay out of pocket secondary to co-pays, co-insurance, or deductibles. If the cost of the testing exceeds $100, the lab will reach out to the patient via phone or e-mail. The patient will then have the option to proceed with the testing, cancel the testing, or elect the self-pay option of $250. Antonella verbalized understanding.     Plan: Patient decided to proceed with testing and provided consent.    Summary:     Sample Collection:  The patient's blood sample was drawn in the office on 2/8 by medical assistant Leo Epps    Genetic Testing Preformed: CustomNext: Cancer® +[employer]nsight® (61 genes): APC, MARV, AXIN2, BAP1, BARD1, BMPR1A, BRCA1, BRCA2, BRIP1, CDH1, CDK4, CDKN1B, CDKN2A, CHEK2, CTNNA1, DICER1, EGLN1, EPCAM, FH, FLCN, GREM1, HOXB13, KIF1B, KIT, MAX, MEN1, MET, MITF, MLH1, MSH2, MSH3, MSH6, MUTYH, NBN, NF1, NTHL1, PALB2, PMS2, POLD1, POLE, POT1, PTEN, RAD50, RAD51C, RAD51D, RB1, RECQL, RET, SDHA, SDHAF2, SDHB, SDHC, SDHD, SMAD4, SMARCA4, STK11, PSDX954, TP53, TSC1, TSC2, VHL     Result Call Information:  In the event that we need to reach Antonella via telephone:  I confirmed the patient's mobile number on file as the best number to call with results  I confirmed with the patient that we can leave a voicemail on the provided numbers    Results take approximately 2-3 weeks to complete once test is started.    Antonella will be notified via [facility] once results are available.      Additional  recommendations for surveillance/medical management will be made pending genetic test results.

## 2024-02-08 NOTE — LETTER
2024     Faustino Millan MD  [address]  [place] [address]    Patient: Antonella Irving  YOB: 1980  Date of Visit: 2024      Dear Dr. Millan:    Thank you for referring Antonella Irving to me for evaluation. Below are my notes for this consultation.    If you have questions, please do not hesitate to call me. I look forward to following your patient along with you.         Sincerely,        Sho Avitia        CC: Ruddy Taylor MD        Pre-Test Genetic Counseling Consult Note    Patient Name: Antonella Irving   /Age: 1980/43 y.o.  Referring Provider: Faustino Millan MD    Date of Service: 2024  Genetic Counselor: Sho Avitia MS, [facility]  Interpretation Services: None  Location: In-person consult at [place]  Length of Visit: 60 Minutes    Antonella was referred to the [facility] Cancer Risk and Genetic Assessment Program due to her family history of pancreatic, uterine, and breast cancer and recent diagnosis of uterine cancer . she presents today to discuss the possibility of a hereditary cancer syndrome, options for genetic testing, and implications for her and her family.        Cancer History and Treatment:   Personal History:   Oncology History   Endometrial carcinoma (HCC)   2024 Initial Diagnosis    Endometrial carcinoma (HCC)     2024 -  Cancer Staged    Staging form: Corpus Uteri - Carcinoma, AJCC 8th Edition  - Clinical stage from 2024: FIGO Stage I (cT1, cM0) - Signed by Faustino Millan MD on 2024  Histopathologic type: Endometrioid adenocarcinoma, NOS  Stage prefix: Initial diagnosis  Histologic grade (G): G1  Histologic grading system: 3 grade system         Screening Hx:   Breast:  Breast Imagin23  Breast Density: Almost entirely fatty    Colon:  Colonoscopy: none    Gynecologic:  LTH/BSO scheduled for     Skin:  No current screening    Other screening: none    Reproductive History  Age at  "menarche: 12  Age at first live birth: Nulliparous  Menopause: Premenopausal  Hormone replacement: none    Medical and Surgical History  Pertinent surgical history:   Past Surgical History:   Procedure Laterality Date   • HERNIA REPAIR  1999      Pertinent medical history:  Past Medical History:   Diagnosis Date   • Asthma    • Depression    • Hyperlipemia    • Hypertension    • Obesity, morbid, BMI 50 or higher (HCC)    • Papanicolaou smear of cervix with positive high risk human papilloma virus (HPV) test     12/2023 pap negative, + HPV type 16;  COLPO:         Other History:  Height:   Ht Readings from Last 1 Encounters:   01/29/24 5' 5\" (1.651 m)     Weight:   Wt Readings from Last 1 Encounters:   01/29/24 (!) 156 kg (344 lb)       Relevant Family History   Patient reports no Ashkenazi Protestant ancestry.     Maternal Family History:  Aunt (d.70s) history of uterine cancer (dx 70)  No other reported history of cancer    Paternal Family History:   Father (d.65) history of pancreatic cancer (dx 65)   Aunt (late 60s) history of breast cancer (dx mid-50s)   Uncle (60s) history of skin findings removed from face that are cancerous   Grandmother (d.84) history of breast cancer (dx 64) and stomach cancer (dx 84)    Please refer to the scanned pedigree in the Media Tab for a complete family history     *All history is reported as provided by the patient; records are not available for review, except where indicated.     Assessment:  We discussed sporadic, familial and hereditary cancer.  We also discussed the many factors that influence our risk for cancer such as age, environmental exposures, lifestyle choices and family history.      We reviewed the indications suggestive of a hereditary predisposition to cancer.    Genetic testing is indicated for Antonella based on the following criteria: Meets NCCN V2.2024 Testing Criteria for Pancreatic Cancer Susceptibility Genes: family history of a first-degree relative diagnosed with " pancreatic cancer  Meets NCCN V1.2023 Testing Criteria for the Evaluation of Weiss syndrome: personal history of uterine cancer diagnosed under 50    The risks, benefits, and limitations of genetic testing were reviewed with the patient, as well as genetic discrimination laws, and possible test results (positive, negative, variants of uncertain significance) and their clinical implications. If positive for a mutation, options for managing cancer risk including increased surveillance, chemoprevention, and in some cases prophylactic surgery were discussed. Antonella was informed that if a hereditary cancer syndrome was identified in her, first degree relatives (parents, siblings, and children) have a chance of also inheriting the condition. Genetic testing would allow for predictive genetic testing in other relatives, who may also be at risk depending on their degree of relation.     Billing:  Most individuals pay <$100 for hereditary cancer genetic testing. If insurance covers the cost of the testing, individuals may still pay out of pocket secondary to co-pays, co-insurance, or deductibles. If the cost of the testing exceeds $100, the lab will reach out to the patient via phone or e-mail. The patient will then have the option to proceed with the testing, cancel the testing, or elect the self-pay option of $250. Antonella verbalized understanding.     Plan: Patient decided to proceed with testing and provided consent.    Summary:     Sample Collection:  The patient's blood sample was drawn in the office on 2/8 by medical assistant Leo Epps    Genetic Testing Preformed: CustomNext: Cancer® +[employer]insight® (61 genes): APC, MARV, AXIN2, BAP1, BARD1, BMPR1A, BRCA1, BRCA2, BRIP1, CDH1, CDK4, CDKN1B, CDKN2A, CHEK2, CTNNA1, DICER1, EGLN1, EPCAM, FH, FLCN, GREM1, HOXB13, KIF1B, KIT, MAX, MEN1, MET, MITF, MLH1, MSH2, MSH3, MSH6, MUTYH, NBN, NF1, NTHL1, PALB2, PMS2, POLD1, POLE, POT1, PTEN, RAD50, RAD51C, RAD51D, RB1, RECQL, RET,  SDHA, SDHAF2, SDHB, SDHC, SDHD, SMAD4, SMARCA4, STK11, [id number], TP53, TSC1, TSC2, VHL     Result Call Information:  In the event that we need to reach Antonella via telephone:  I confirmed the patient's mobile number on file as the best number to call with results  I confirmed with the patient that we can leave a voicemail on the provided numbers    Results take approximately 2-3 weeks to complete once test is started.    Antonella will be notified via [employer] once results are available.      Additional recommendations for surveillance/medical management will be made pending genetic test results.

## 2024-02-14 ENCOUNTER — HOSPITAL ENCOUNTER (OUTPATIENT)
Dept: RADIOLOGY | Facility: HOSPITAL | Age: 44
Discharge: HOME | End: 2024-02-14
Payer: COMMERCIAL

## 2024-02-14 ENCOUNTER — LAB REQUISITION (OUTPATIENT)
Dept: LAB | Facility: HOSPITAL | Age: 44
End: 2024-02-14
Payer: COMMERCIAL

## 2024-02-14 ENCOUNTER — APPOINTMENT (OUTPATIENT)
Dept: LAB | Facility: HOSPITAL | Age: 44
End: 2024-02-14
Payer: COMMERCIAL

## 2024-02-14 DIAGNOSIS — C54.1 MALIGNANT NEOPLASM OF ENDOMETRIUM (HCC): ICD-10-CM

## 2024-02-14 DIAGNOSIS — Z01.818 OTHER SPECIFIED PRE-OPERATIVE EXAMINATION: ICD-10-CM

## 2024-02-14 DIAGNOSIS — N87.1 DYSPLASIA OF CERVIX, HIGH GRADE CIN 2: ICD-10-CM

## 2024-02-14 DIAGNOSIS — C54.1 ENDOMETRIAL CARCINOMA (HCC): ICD-10-CM

## 2024-02-14 LAB
ABO GROUP BLD: NORMAL
ALBUMIN SERPL BCP-MCNC: 4 G/DL (ref 3.5–5)
ALP SERPL-CCNC: 44 U/L (ref 34–104)
ALT SERPL W P-5'-P-CCNC: 18 U/L (ref 7–52)
ANION GAP SERPL CALCULATED.3IONS-SCNC: 4 MMOL/L
AST SERPL W P-5'-P-CCNC: 19 U/L (ref 13–39)
ATRIAL RATE: 68 BPM
BASOPHILS # BLD AUTO: 0.04 THOUSANDS/ÂΜL (ref 0–0.1)
BASOPHILS NFR BLD AUTO: 1 % (ref 0–1)
BILIRUB SERPL-MCNC: 0.46 MG/DL (ref 0.2–1)
BLD GP AB SCN SERPL QL: NEGATIVE
BUN SERPL-MCNC: 20 MG/DL (ref 5–25)
CALCIUM SERPL-MCNC: 9.2 MG/DL (ref 8.4–10.2)
CHLORIDE SERPL-SCNC: 102 MMOL/L (ref 96–108)
CO2 SERPL-SCNC: 30 MMOL/L (ref 21–32)
CREAT SERPL-MCNC: 0.88 MG/DL (ref 0.6–1.3)
EOSINOPHIL # BLD AUTO: 0.09 THOUSAND/ÂΜL (ref 0–0.61)
EOSINOPHIL NFR BLD AUTO: 2 % (ref 0–6)
ERYTHROCYTE [DISTWIDTH] IN BLOOD BY AUTOMATED COUNT: 12.5 % (ref 11.6–15.1)
EST. AVERAGE GLUCOSE BLD GHB EST-MCNC: 126 MG/DL
GFR SERPL CREATININE-BSD FRML MDRD: 80 ML/MIN/1.73SQ M
GLUCOSE P FAST SERPL-MCNC: 98 MG/DL (ref 65–99)
HBA1C MFR BLD: 6 %
HCT VFR BLD AUTO: 39.6 % (ref 34.8–46.1)
HGB BLD-MCNC: 13.1 G/DL (ref 11.5–15.4)
IMM GRANULOCYTES # BLD AUTO: 0.03 THOUSAND/UL (ref 0–0.2)
IMM GRANULOCYTES NFR BLD AUTO: 1 % (ref 0–2)
LYMPHOCYTES # BLD AUTO: 2.13 THOUSANDS/ÂΜL (ref 0.6–4.47)
LYMPHOCYTES NFR BLD AUTO: 35 % (ref 14–44)
MCH RBC QN AUTO: 28.3 PG (ref 26.8–34.3)
MCHC RBC AUTO-ENTMCNC: 33.1 G/DL (ref 31.4–37.4)
MCV RBC AUTO: 86 FL (ref 82–98)
MONOCYTES # BLD AUTO: 0.38 THOUSAND/ÂΜL (ref 0.17–1.22)
MONOCYTES NFR BLD AUTO: 6 % (ref 4–12)
NEUTROPHILS # BLD AUTO: 3.41 THOUSANDS/ÂΜL (ref 1.85–7.62)
NEUTS SEG NFR BLD AUTO: 55 % (ref 43–75)
NRBC BLD AUTO-RTO: 0 /100 WBCS
P AXIS: 46 DEGREES
PLATELET # BLD AUTO: 341 THOUSANDS/UL (ref 149–390)
PMV BLD AUTO: 9.4 FL (ref 8.9–12.7)
POTASSIUM SERPL-SCNC: 4.3 MMOL/L (ref 3.5–5.3)
PR INTERVAL: 150 MS
PROT SERPL-MCNC: 6.7 G/DL (ref 6.4–8.4)
QRS AXIS: 28 DEGREES
QRSD INTERVAL: 88 MS
QT INTERVAL: 414 MS
QTC INTERVAL: 440 MS
RBC # BLD AUTO: 4.63 MILLION/UL (ref 3.81–5.12)
RH BLD: POSITIVE
SODIUM SERPL-SCNC: 136 MMOL/L (ref 135–147)
SPECIMEN EXPIRATION DATE: NORMAL
T WAVE AXIS: -4 DEGREES
VENTRICULAR RATE: 68 BPM
WBC # BLD AUTO: 6.08 THOUSAND/UL (ref 4.31–10.16)

## 2024-02-14 PROCEDURE — 86850 RBC ANTIBODY SCREEN: CPT | Performed by: OBSTETRICS & GYNECOLOGY

## 2024-02-14 PROCEDURE — 36415 COLL VENOUS BLD VENIPUNCTURE: CPT

## 2024-02-14 PROCEDURE — 80053 COMPREHEN METABOLIC PANEL: CPT

## 2024-02-14 PROCEDURE — 71046 X-RAY EXAM CHEST 2 VIEWS: CPT

## 2024-02-14 PROCEDURE — 85025 COMPLETE CBC W/AUTO DIFF WBC: CPT

## 2024-02-14 PROCEDURE — 83036 HEMOGLOBIN GLYCOSYLATED A1C: CPT

## 2024-02-14 PROCEDURE — 86900 BLOOD TYPING SEROLOGIC ABO: CPT | Performed by: OBSTETRICS & GYNECOLOGY

## 2024-02-14 PROCEDURE — 86901 BLOOD TYPING SEROLOGIC RH(D): CPT | Performed by: OBSTETRICS & GYNECOLOGY

## 2024-02-16 NOTE — PRE-PROCEDURE INSTRUCTIONS
Pre-Surgery Instructions:   Medication Instructions    ergocalciferol (VITAMIN D2) 50,000 units Instructions provided by MD mondays    FLUoxetine (PROzac) 40 MG capsule Take day of surgery.    lisinopril-hydrochlorothiazide (PRINZIDE,ZESTORETIC) 20-25 MG per tablet Hold day of surgery.    rosuvastatin (CRESTOR) 5 mg tablet Take night before surgery   Medication instructions for day surgery reviewed. Please use only a sip of water to take your instructed medications. Avoid all over the counter vitamins, supplements and NSAIDS for one week prior to surgery per anesthesia guidelines. Tylenol is ok to take as needed.     You will receive a call one business day prior to surgery with an arrival time and hospital directions. Do not eat or drink anything after midnight the night before your surgery, including candy, mints, lifesavers, or chewing gum. Do not drink alcohol 24hrs before your surgery. Try not to smoke at least 24hrs before your surgery.       Follow the pre surgery showering instructions as listed in the “My Surgical Experience Booklet” or otherwise provided by your surgeon's office. Do not use a blade to shave the surgical area 1 week before surgery. It is okay to use a clean electric clippers up to 24 hours before surgery. Do not apply any lotions, creams, including makeup, cologne, deodorant, or perfumes after showering on the day of your surgery. Do not use dry shampoo, hair spray, hair gel, or any type of hair products.     No contact lenses, eye make-up, or artificial eyelashes. Remove nail polish, including gel polish, and any artificial, gel, or acrylic nails if possible. Remove all jewelry including rings and body piercing jewelry.     Wear causal clothing that is easy to take on and off. Consider your type of surgery.    Keep any valuables, jewelry, piercings at home. Please bring any specially ordered equipment (sling, braces) if indicated.    Arrange for a responsible person to drive you to and  from the hospital on the day of your surgery. Visitor Guidelines discussed.     Call the surgeon's office with any new illnesses, exposures, or additional questions prior to surgery.    Please reference your “My Surgical Experience Booklet” for additional information to prepare for your upcoming surgery.

## 2024-02-20 DIAGNOSIS — F32.A DEPRESSION, UNSPECIFIED DEPRESSION TYPE: ICD-10-CM

## 2024-02-20 RX ORDER — FLUOXETINE HYDROCHLORIDE 40 MG/1
CAPSULE ORAL
Qty: 90 CAPSULE | Refills: 1 | Status: SHIPPED | OUTPATIENT
Start: 2024-02-20

## 2024-02-21 PROBLEM — Z13.220 SCREENING FOR HYPERLIPIDEMIA: Status: RESOLVED | Noted: 2019-08-09 | Resolved: 2024-02-21

## 2024-02-21 PROBLEM — Z00.00 HEALTHCARE MAINTENANCE: Status: RESOLVED | Noted: 2020-09-18 | Resolved: 2024-02-21

## 2024-02-26 ENCOUNTER — HOSPITAL ENCOUNTER (OUTPATIENT)
Dept: RADIOLOGY | Facility: IMAGING CENTER | Age: 44
Discharge: HOME | End: 2024-02-26
Payer: COMMERCIAL

## 2024-02-26 VITALS — WEIGHT: 293 LBS | BODY MASS INDEX: 48.82 KG/M2 | HEIGHT: 65 IN

## 2024-02-26 DIAGNOSIS — Z12.31 ENCOUNTER FOR SCREENING MAMMOGRAM FOR BREAST CANCER: ICD-10-CM

## 2024-02-26 PROCEDURE — 77063 BREAST TOMOSYNTHESIS BI: CPT

## 2024-02-26 PROCEDURE — 77067 SCR MAMMO BI INCL CAD: CPT

## 2024-02-27 ENCOUNTER — HOSPITAL ENCOUNTER (OUTPATIENT)
Facility: HOSPITAL | Age: 44
Setting detail: OUTPATIENT SURGERY
Discharge: HOME | End: 2024-02-27
Attending: OBSTETRICS & GYNECOLOGY | Admitting: OBSTETRICS & GYNECOLOGY
Payer: COMMERCIAL

## 2024-02-27 ENCOUNTER — ANESTHESIA (OUTPATIENT)
Dept: PERIOP | Facility: HOSPITAL | Age: 44
End: 2024-02-27
Payer: COMMERCIAL

## 2024-02-27 ENCOUNTER — ANESTHESIA EVENT (OUTPATIENT)
Dept: PERIOP | Facility: HOSPITAL | Age: 44
End: 2024-02-27
Payer: COMMERCIAL

## 2024-02-27 VITALS
RESPIRATION RATE: 22 BRPM | OXYGEN SATURATION: 94 % | SYSTOLIC BLOOD PRESSURE: 125 MMHG | TEMPERATURE: 97.6 F | HEIGHT: 65 IN | HEART RATE: 86 BPM | DIASTOLIC BLOOD PRESSURE: 62 MMHG | BODY MASS INDEX: 48.82 KG/M2 | WEIGHT: 293 LBS

## 2024-02-27 DIAGNOSIS — G89.18 POST-OP PAIN: Primary | ICD-10-CM

## 2024-02-27 DIAGNOSIS — N87.1 DYSPLASIA OF CERVIX, HIGH GRADE CIN 2: ICD-10-CM

## 2024-02-27 DIAGNOSIS — C54.1 ENDOMETRIAL CARCINOMA (HCC): ICD-10-CM

## 2024-02-27 LAB
EXT PREGNANCY TEST URINE: NEGATIVE
EXT. CONTROL: NORMAL
GLUCOSE SERPL-MCNC: 114 MG/DL (ref 65–140)

## 2024-02-27 PROCEDURE — 88360 TUMOR IMMUNOHISTOCHEM/MANUAL: CPT | Performed by: PATHOLOGY

## 2024-02-27 PROCEDURE — 88307 TISSUE EXAM BY PATHOLOGIST: CPT | Performed by: PATHOLOGY

## 2024-02-27 PROCEDURE — 82948 REAGENT STRIP/BLOOD GLUCOSE: CPT

## 2024-02-27 PROCEDURE — NC001 PR NO CHARGE: Performed by: PHYSICIAN ASSISTANT

## 2024-02-27 PROCEDURE — 81025 URINE PREGNANCY TEST: CPT | Performed by: OBSTETRICS & GYNECOLOGY

## 2024-02-27 PROCEDURE — 88374 M/PHMTRC ALYS ISHQUANT/SEMIQ: CPT | Performed by: PATHOLOGY

## 2024-02-27 PROCEDURE — 88342 IMHCHEM/IMCYTCHM 1ST ANTB: CPT | Performed by: PATHOLOGY

## 2024-02-27 PROCEDURE — 88341 IMHCHEM/IMCYTCHM EA ADD ANTB: CPT | Performed by: PATHOLOGY

## 2024-02-27 PROCEDURE — 88344 IMHCHEM/IMCYTCHM EA MLT ANTB: CPT | Performed by: PATHOLOGY

## 2024-02-27 PROCEDURE — 88309 TISSUE EXAM BY PATHOLOGIST: CPT | Performed by: PATHOLOGY

## 2024-02-27 PROCEDURE — 88112 CYTOPATH CELL ENHANCE TECH: CPT | Performed by: PATHOLOGY

## 2024-02-27 RX ORDER — ENOXAPARIN SODIUM 100 MG/ML
INJECTION SUBCUTANEOUS
Status: COMPLETED
Start: 2024-02-27 | End: 2024-02-27

## 2024-02-27 RX ORDER — FENTANYL CITRATE/PF 50 MCG/ML
25 SYRINGE (ML) INJECTION
Status: DISCONTINUED | OUTPATIENT
Start: 2024-02-27 | End: 2024-02-27 | Stop reason: HOSPADM

## 2024-02-27 RX ORDER — FENTANYL CITRATE 50 UG/ML
INJECTION, SOLUTION INTRAMUSCULAR; INTRAVENOUS AS NEEDED
Status: DISCONTINUED | OUTPATIENT
Start: 2024-02-27 | End: 2024-02-27

## 2024-02-27 RX ORDER — METRONIDAZOLE 500 MG/100ML
500 INJECTION, SOLUTION INTRAVENOUS EVERY 8 HOURS
Status: DISCONTINUED | OUTPATIENT
Start: 2024-02-27 | End: 2024-02-27 | Stop reason: HOSPADM

## 2024-02-27 RX ORDER — HYDROMORPHONE HCL/PF 1 MG/ML
0.4 SYRINGE (ML) INJECTION
Status: DISCONTINUED | OUTPATIENT
Start: 2024-02-27 | End: 2024-02-27 | Stop reason: HOSPADM

## 2024-02-27 RX ORDER — ALBUTEROL SULFATE 90 UG/1
AEROSOL, METERED RESPIRATORY (INHALATION) AS NEEDED
Status: DISCONTINUED | OUTPATIENT
Start: 2024-02-27 | End: 2024-02-27

## 2024-02-27 RX ORDER — INDOCYANINE GREEN AND WATER 25 MG
KIT INJECTION AS NEEDED
Status: DISCONTINUED | OUTPATIENT
Start: 2024-02-27 | End: 2024-02-27 | Stop reason: HOSPADM

## 2024-02-27 RX ORDER — ENOXAPARIN SODIUM 100 MG/ML
40 INJECTION SUBCUTANEOUS
Status: DISCONTINUED | OUTPATIENT
Start: 2024-02-28 | End: 2024-02-27

## 2024-02-27 RX ORDER — ENOXAPARIN SODIUM 100 MG/ML
40 INJECTION SUBCUTANEOUS
Status: COMPLETED | OUTPATIENT
Start: 2024-02-27 | End: 2024-02-27

## 2024-02-27 RX ORDER — GABAPENTIN 100 MG/1
100 CAPSULE ORAL ONCE
Status: COMPLETED | OUTPATIENT
Start: 2024-02-27 | End: 2024-02-27

## 2024-02-27 RX ORDER — ACETAMINOPHEN 325 MG/1
975 TABLET ORAL EVERY 6 HOURS PRN
Status: DISCONTINUED | OUTPATIENT
Start: 2024-02-27 | End: 2024-02-27 | Stop reason: HOSPADM

## 2024-02-27 RX ORDER — PROPOFOL 10 MG/ML
INJECTION, EMULSION INTRAVENOUS AS NEEDED
Status: DISCONTINUED | OUTPATIENT
Start: 2024-02-27 | End: 2024-02-27

## 2024-02-27 RX ORDER — CELECOXIB 200 MG/1
200 CAPSULE ORAL ONCE
Status: COMPLETED | OUTPATIENT
Start: 2024-02-27 | End: 2024-02-27

## 2024-02-27 RX ORDER — ONDANSETRON 2 MG/ML
INJECTION INTRAMUSCULAR; INTRAVENOUS AS NEEDED
Status: DISCONTINUED | OUTPATIENT
Start: 2024-02-27 | End: 2024-02-27

## 2024-02-27 RX ORDER — ONDANSETRON 2 MG/ML
4 INJECTION INTRAMUSCULAR; INTRAVENOUS EVERY 6 HOURS PRN
Status: DISCONTINUED | OUTPATIENT
Start: 2024-02-27 | End: 2024-02-27 | Stop reason: HOSPADM

## 2024-02-27 RX ORDER — MIDAZOLAM HYDROCHLORIDE 2 MG/2ML
INJECTION, SOLUTION INTRAMUSCULAR; INTRAVENOUS AS NEEDED
Status: DISCONTINUED | OUTPATIENT
Start: 2024-02-27 | End: 2024-02-27

## 2024-02-27 RX ORDER — HYDROMORPHONE HYDROCHLORIDE 2 MG/ML
INJECTION, SOLUTION INTRAMUSCULAR; INTRAVENOUS; SUBCUTANEOUS AS NEEDED
Status: DISCONTINUED | OUTPATIENT
Start: 2024-02-27 | End: 2024-02-27

## 2024-02-27 RX ORDER — SODIUM CHLORIDE, SODIUM LACTATE, POTASSIUM CHLORIDE, CALCIUM CHLORIDE 600; 310; 30; 20 MG/100ML; MG/100ML; MG/100ML; MG/100ML
125 INJECTION, SOLUTION INTRAVENOUS CONTINUOUS
Status: DISCONTINUED | OUTPATIENT
Start: 2024-02-27 | End: 2024-02-27 | Stop reason: HOSPADM

## 2024-02-27 RX ORDER — SODIUM CHLORIDE 9 MG/ML
INJECTION, SOLUTION INTRAVENOUS CONTINUOUS PRN
Status: DISCONTINUED | OUTPATIENT
Start: 2024-02-27 | End: 2024-02-27

## 2024-02-27 RX ORDER — PROMETHAZINE HYDROCHLORIDE 25 MG/ML
12.5 INJECTION, SOLUTION INTRAMUSCULAR; INTRAVENOUS ONCE AS NEEDED
Status: DISCONTINUED | OUTPATIENT
Start: 2024-02-27 | End: 2024-02-27 | Stop reason: HOSPADM

## 2024-02-27 RX ORDER — ROCURONIUM BROMIDE 10 MG/ML
INJECTION, SOLUTION INTRAVENOUS AS NEEDED
Status: DISCONTINUED | OUTPATIENT
Start: 2024-02-27 | End: 2024-02-27

## 2024-02-27 RX ORDER — ACETAMINOPHEN 325 MG/1
975 TABLET ORAL ONCE
Status: COMPLETED | OUTPATIENT
Start: 2024-02-27 | End: 2024-02-27

## 2024-02-27 RX ORDER — BUPIVACAINE HYDROCHLORIDE 5 MG/ML
INJECTION, SOLUTION EPIDURAL; INTRACAUDAL AS NEEDED
Status: DISCONTINUED | OUTPATIENT
Start: 2024-02-27 | End: 2024-02-27 | Stop reason: HOSPADM

## 2024-02-27 RX ORDER — DEXAMETHASONE SODIUM PHOSPHATE 10 MG/ML
INJECTION, SOLUTION INTRAMUSCULAR; INTRAVENOUS AS NEEDED
Status: DISCONTINUED | OUTPATIENT
Start: 2024-02-27 | End: 2024-02-27

## 2024-02-27 RX ORDER — ONDANSETRON 2 MG/ML
4 INJECTION INTRAMUSCULAR; INTRAVENOUS ONCE AS NEEDED
Status: DISCONTINUED | OUTPATIENT
Start: 2024-02-27 | End: 2024-02-27 | Stop reason: HOSPADM

## 2024-02-27 RX ORDER — OXYCODONE HYDROCHLORIDE 5 MG/1
5 TABLET ORAL EVERY 4 HOURS PRN
Qty: 10 TABLET | Refills: 0 | Status: SHIPPED | OUTPATIENT
Start: 2024-02-27 | End: 2024-03-08

## 2024-02-27 RX ORDER — IBUPROFEN 400 MG/1
600 TABLET ORAL EVERY 6 HOURS PRN
Status: DISCONTINUED | OUTPATIENT
Start: 2024-02-27 | End: 2024-02-27 | Stop reason: HOSPADM

## 2024-02-27 RX ORDER — LIDOCAINE HYDROCHLORIDE 10 MG/ML
INJECTION, SOLUTION EPIDURAL; INFILTRATION; INTRACAUDAL; PERINEURAL AS NEEDED
Status: DISCONTINUED | OUTPATIENT
Start: 2024-02-27 | End: 2024-02-27

## 2024-02-27 RX ADMIN — CELECOXIB 200 MG: 200 CAPSULE ORAL at 12:59

## 2024-02-27 RX ADMIN — METRONIDAZOLE: 500 SOLUTION INTRAVENOUS at 13:50

## 2024-02-27 RX ADMIN — ROCURONIUM BROMIDE 30 MG: 10 INJECTION, SOLUTION INTRAVENOUS at 14:27

## 2024-02-27 RX ADMIN — ENOXAPARIN SODIUM 40 MG: 100 INJECTION SUBCUTANEOUS at 13:07

## 2024-02-27 RX ADMIN — IBUPROFEN 600 MG: 400 TABLET, FILM COATED ORAL at 17:56

## 2024-02-27 RX ADMIN — ONDANSETRON 4 MG: 2 INJECTION INTRAMUSCULAR; INTRAVENOUS at 15:21

## 2024-02-27 RX ADMIN — LIDOCAINE HYDROCHLORIDE 50 MG: 10 INJECTION, SOLUTION EPIDURAL; INFILTRATION; INTRACAUDAL; PERINEURAL at 13:48

## 2024-02-27 RX ADMIN — Medication 3000 MG: at 13:50

## 2024-02-27 RX ADMIN — FENTANYL CITRATE 100 MCG: 50 INJECTION INTRAMUSCULAR; INTRAVENOUS at 13:48

## 2024-02-27 RX ADMIN — SUGAMMADEX 310 MG: 100 INJECTION, SOLUTION INTRAVENOUS at 15:33

## 2024-02-27 RX ADMIN — ACETAMINOPHEN 975 MG: 325 TABLET, FILM COATED ORAL at 12:59

## 2024-02-27 RX ADMIN — SODIUM CHLORIDE: 0.9 INJECTION, SOLUTION INTRAVENOUS at 13:53

## 2024-02-27 RX ADMIN — ROCURONIUM BROMIDE 20 MG: 10 INJECTION, SOLUTION INTRAVENOUS at 15:10

## 2024-02-27 RX ADMIN — ENOXAPARIN SODIUM 40 MG: 40 INJECTION SUBCUTANEOUS at 13:07

## 2024-02-27 RX ADMIN — GABAPENTIN 100 MG: 100 CAPSULE ORAL at 12:59

## 2024-02-27 RX ADMIN — PHENYLEPHRINE HYDROCHLORIDE 20 MCG/MIN: 10 INJECTION INTRAVENOUS at 14:50

## 2024-02-27 RX ADMIN — ROCURONIUM BROMIDE 50 MG: 10 INJECTION, SOLUTION INTRAVENOUS at 13:48

## 2024-02-27 RX ADMIN — ROCURONIUM BROMIDE 20 MG: 10 INJECTION, SOLUTION INTRAVENOUS at 14:34

## 2024-02-27 RX ADMIN — HYDROMORPHONE HYDROCHLORIDE 1 MG: 2 INJECTION, SOLUTION INTRAMUSCULAR; INTRAVENOUS; SUBCUTANEOUS at 14:28

## 2024-02-27 RX ADMIN — ALBUTEROL SULFATE 6 PUFF: 90 AEROSOL, METERED RESPIRATORY (INHALATION) at 14:42

## 2024-02-27 RX ADMIN — DEXAMETHASONE SODIUM PHOSPHATE 10 MG: 10 INJECTION, SOLUTION INTRAMUSCULAR; INTRAVENOUS at 13:48

## 2024-02-27 RX ADMIN — SUGAMMADEX 300 MG: 100 INJECTION, SOLUTION INTRAVENOUS at 15:45

## 2024-02-27 RX ADMIN — SODIUM CHLORIDE, SODIUM LACTATE, POTASSIUM CHLORIDE, AND CALCIUM CHLORIDE 125 ML/HR: .6; .31; .03; .02 INJECTION, SOLUTION INTRAVENOUS at 13:00

## 2024-02-27 RX ADMIN — MIDAZOLAM 2 MG: 1 INJECTION INTRAMUSCULAR; INTRAVENOUS at 13:42

## 2024-02-27 RX ADMIN — PROPOFOL 200 MG: 10 INJECTION, EMULSION INTRAVENOUS at 13:48

## 2024-02-27 NOTE — ANESTHESIA PREPROCEDURE EVALUATION
"\"The patient has a new diagnosis of endometrial adenocarcinoma by her most recent biopsy. This appears to be early stage by clinical exam. We have discussed treatment options for this disease including chemotherapy, radiation therapy, and hormonal management. I have stated that medical evidence indicates that at this point to surgical management is her best option \"    Procedure:  HYSTERECTOMY LAPAROSCOPIC TOTAL (LTH) W/ ROBOTICS, BILATERAL SLAPINGO-OOPHORECTOMY, LYMPH NODE MAPPING AND BIOPSY (Abdomen)    Relevant Problems   CARDIO   (+) Essential hypertension   (+) Other hyperlipidemia      GI/HEPATIC   (+) Gastroesophageal reflux disease without esophagitis      GYN   (+) Endometrial carcinoma (HCC)      MUSCULOSKELETAL   (+) Chronic left-sided low back pain without sciatica   (+) Mid back pain   (+) Shoulder impingement syndrome, left      NEURO/PSYCH   (+) Anxiety   (+) Chronic left-sided low back pain without sciatica   (+) Depression, recurrent (HCC)   (+) Mild episode of recurrent major depressive disorder (HCC)   (+) Recurrent major depressive disorder, in partial remission (HCC)      PULMONARY   (+) Acute laryngitis   (+) Asthma        Physical Exam    Airway    Mallampati score: IV  TM Distance: >3 FB  Neck ROM: full     Dental   No notable dental hx     Cardiovascular  Rhythm: regular, No weak pulses    Pulmonary   No stridor    Other Findings  post-pubertal.      Anesthesia Plan  ASA Score- 3     Anesthesia Type- general with ASA Monitors.         Additional Monitors:     Airway Plan: ETT.           Plan Factors-    Chart reviewed. EKG reviewed.  Existing labs reviewed. Patient summary reviewed.                  Induction- intravenous.    Postoperative Plan- Plan for postoperative opioid use. Planned trial extubation    Informed Consent- Anesthetic plan and risks discussed with patient.  I personally reviewed this patient with the CRNA. Discussed and agreed on the Anesthesia Plan with the " CRNA..

## 2024-02-27 NOTE — OP NOTE
OPERATIVE REPORT  PATIENT NAME: Antonella Irving    :  1980  MRN: [id number]  Pt Location: [facility] OR ROOM 14    SURGERY DATE: 2024    Surgeons and Role:     * Daria Nuñez MD - Primary     * Stephy Rice PA-C - Assisting     * Jennifer Dockery PA-C - Assisting     * Tylor Yi MD - Assisting     * Denice Coats MD - Assisting    Preop Diagnosis:  Endometrial carcinoma (HCC) [C54.1]  Dysplasia of cervix, high grade SONYA 2 [N87.1]    Post-Op Diagnosis Codes:     * Endometrial carcinoma (HCC) [C54.1]     * Dysplasia of cervix, high grade SONYA 2 [N87.1]    Procedure(s):  HYSTERECTOMY LAPAROSCOPIC TOTAL (LTH) W/ ROBOTICS. BILATERAL SALPINGO-OOPHORECTOMY. LYMPH NODE DISSECTION  LYMPHOGRAPHY WITH INDOCYANINE GREEN (ICG)    Specimen(s):  ID Type Source Tests Collected by Time Destination   1 :  Washing Pelvic Washing NON-GYNECOLOGIC CYTOLOGY Daria Nuñez MD 2024  2:27 PM    2 : left pelvic Tissue Lymph Node, [place] TISSUE EXAM Daria Nuñez MD 2024  2:33 PM    3 : RIGHT PELVIC Tissue Lymph Node, [place] TISSUE EXAM Daria Nuñez MD 2024  2:41 PM    4 : CERVIX, UTERUS, BILATERAL FALLOPIAN TUBES AND OVARIES Tissue Uterus w/Bilateral Ovaries and Fallopian Tubes TISSUE EXAM Daria Nuñez MD 2024  3:06 PM        Estimated Blood Loss:   Minimal    Drains:  Urethral Catheter Double-lumen;Non-latex 16 Fr. (Active)   Number of days: 0       Anesthesia Type:   General    Operative Indications:  Endometrial carcinoma (HCC) [C54.1]  Dysplasia of cervix, high grade SONYA 2 [N87.1]      42yo with hx of AUB found to have endometrial adenocarcinoma. The results of her diagnostic testing, her differential diagnosis and treatment options, and the benefits and risks of these were reviewed. She has a good understanding and has agreed to proceed with definitive surgical management.    Operative Findings:  Normal bowel, omentum, liver, gallbladder.  Normal appearing upper abdomen. Normal appearing  uterus, bilateral fallopian tubes and ovaries. No evidence of extra-uterine disease. Bilateral nodes mapped to the external iliac.     Complications:   None    Procedure and Technique:  The patient was brought to the Operating Room where general anesthesia was induced. Large pannus was taped to the anterior thigh to prevent movement during trendelenberg. The abdomen, vagina and perineum were prepped and draped. Attention was turned to patient's vagina.  Atraumatic vaginal retractors were placed to identify the cervix which was grasped with a single tooth tenaculum. The cervix was easily dilated and the uterus sounded to 12 cm.  ICG dye diluted with 20 cc of sterile water was injected at 3:00 and 9:00 of cervix.  One mL was injected approx 1 cm deep and additionally injected superficially.  Total of 4 mL was injected.  A small Silverio ring was placed around the cervix.  Then WANG cannula was inserted in the uterine cavity without difficulty. The WANG balloon was then inflated with water until resistance was met. A Shields catheter was placed in a sterile fashion.    An incision was made at palmers point and with the abdomen under anterior traction with two michael-umbilical towel clamps, a Veress needle was inserted into the abdominal cavity with the carbon dioxide on low flow. Immediate pressure drop to 0 mm and diffuse abdominal distention indicated intraperitoneal placement. The peritoneal cavity was then insufflated with carbon dioxide on high flow to maintain a pressure of 15 mm Hg throughout the case.  the robotic trocar was introduced into the abdominal cavity. The laparoscope was then inserted and the peritoneal cavity explored with the above findings. There was no evidence of visceral injury. Additional robotic ports and a 5mm assistant port were placed under direct visualization.  With the patient in steep Trendelenburg, the robot was docked to the robotic ports and the instruments were placed under direct  visualization.    Careful survey of abdomen and pelvis was performed to reveal above notable findings.   The peritoneum was incised in planes lateral and parallel to the ovarian vessels on both sides.  Retroperitoneal space was entered. Avascular spaces were opened.  The ureters were then identified retroperitoneally, coursing well inferiorly to the ovarian vessels.  Firefly camera was activated on robotic system.  Pelvic sentinel lymph nodes were detected bilaterally. Nodes were carefully removed using monopolar cautery taking care to avoid neuronal and vascular injury.     With the uterus on upward traction, a window was created superior to the ureter to skeleontize the ovarian vessels which were then cauterized and divided using the synchroseal. The round ligaments were isolated, cauterized and cut. The posterior peritoneum was dropped to the level of the koh ring. The vesico-uterine peritoneum was incised, and the bladder dissected from the cervix and upper vagina in a meticulous fashion to the level of the koh ring. The uterine vessels were then skeletonized bilaterally and coagulated at the level of the koh ring using bipolar current, then divided. With the bladder mobilized anteriorally and the rectum well away posteriorally, using the monopolar device, an incision was made in the anterior upper vagina at the level of the cervical-vaginal junction.  The incision was continued circumferentially around the upper vagina, controlling upper vaginal blood supply laterally using the biopolar. This allowed completely amputation of the specimen. The vaginal balloon was removed. The uterus, cervix, bilateral fallopian tubes and ovaries were then removed en bloc transvaginally    The upper vagina was then closed laparoscopically with 2-0 stratafix, taking care to avoid bladder injury. The upper vagina was intact and hemostatic. The vaginal balloon was removed.     The robotic instruments were removed, and the robot  undocked. The fascia at the 12 mm ports site was closed with 0 vicryl stitch. The laparoscopic skin incisions were irrigated and made hemostatic using electrocoagulation. They were closed with subcuticular stitches of 4-0 monocryl.     All sponge, needle and instrument counts were correct x2.  Anesthesia was reversed and the patient was taken to the PACU in a stable condition.    I was present for the entire procedure. and A physician assistant was required during the procedure for retraction, tissue handling, dissection and suturing.    Patient Disposition:  PACU     SIGNATURE: Daria Nuñez MD  DATE: February 27, 2024  TIME: 3:22 PM    SIGNATURE: Daria Nuñez MD  DATE: February 27, 2024  TIME: 3:22 PM

## 2024-02-27 NOTE — ANESTHESIA POSTPROCEDURE EVALUATION
Post-Op Assessment Note    CV Status:  Stable    Pain management: adequate       Mental Status:  Sleepy   Hydration Status:  Euvolemic   PONV Controlled:  Controlled   Airway Patency:  Patent     Post Op Vitals Reviewed: Yes    No anethesia notable event occurred.    Staff: CRNA               BP   125/58   Temp      Pulse  85    Resp      SpO2   91 RA N/C 2l

## 2024-02-27 NOTE — INTERVAL H&P NOTE
H&P reviewed. After examining the patient I find no changes in the patients condition since the H&P had been written.    Vitals:    02/27/24 1206   BP: 117/59   Pulse: 73   Temp: 97.5 °F (36.4 °C)   SpO2: 96%

## 2024-02-27 NOTE — DISCHARGE INSTRUCTIONS
[facility] - Gynecologic Oncology  Michael Santacruz [facility]ay and Joaquin  [phone]    Hysterectomy Discharge Instructions    WHAT YOU NEED TO KNOW:   A hysterectomy is surgery to remove your uterus. Your ovaries, fallopian tubes, cervix, or part of your vagina may also need to be removed. The organs and tissue that will be removed depends on your medical condition.  After a hysterectomy, you will not be able to become pregnant.  If your ovaries are removed, you will go through menopause if you have not already.    DISCHARGE INSTRUCTIONS:   Contact your doctor at the number above if:   You have a fever over 101o.  You have nausea or are vomiting that does not improve after a light meal.   Your pain is getting worse, even after you take medicine.   You feel pain or burning when you urinate, or you have trouble urinating.   You have pus or a foul-smelling odor coming from your vagina.    Your wound is red, swollen, or draining pus.  You see new or an increased amount of bright red blood coming from your vagina or your incisions.   You have questions or concerns about your condition or care.    Seek care immediately:   Your arm or leg feels warm, tender, and painful. It may look swollen and red.  You have increasing abdominal or pelvic pain.   You have heavy vaginal bleeding that fills 1 or more sanitary pads in 1 hour.    Call 911 for any of the following:   You feel lightheaded, short of breath, and have chest pain.   You cough up blood.    Medicines: You may need any of the following:  Prescription medicine may be given. You may receive a prescription for pain medication or be advised to use over the counter (OTC) pain medication such as acetaminophen (Tylenol) or ibuprofen (Advil, Motrin). Ask your healthcare provider how to take this medicine safely.  NSAIDs , such as ibuprofen, help decrease swelling, pain, and fever. NSAIDs can cause stomach bleeding or kidney problems in certain  people. If you take blood thinner medicine, always ask your healthcare provider if NSAIDs are safe for you. Always read the medicine label and follow directions.   Stool softeners help treat or prevent constipation.    Take your medicine as directed. Contact your healthcare provider if you think your medicine is not helping or if you have side effects. Tell him or her if you are allergic to any medicine. Keep a list of the medicines, vitamins, and herbs you take. Include the amounts, and when and why you take them. Bring the list or the pill bottles to follow-up visits. Carry your medicine list with you in case of an emergency.    Activity:   Rest as needed. Get up and move around as directed to help prevent blood clots. Start with short walks and slowly increase the distance every day. Limit the number of times you climb stairs to 2 times each day for the first week. Plan most of your daily activities on one level of your home.      Do not lift objects heavier than 10 pounds for 6 weeks. Avoid strenuous activity for 2 weeks.      Do not strain during bowel movements. High-fiber foods and extra liquids can help you prevent constipation. Examples of high-fiber foods are fruit and bran. Prune juice and water are good liquids to drink.      Do not have sex, use tampons, or douche for up to 8 weeks.     You may shower as soon as the day after surgery.  Tub baths can be taken starting 2 weeks after surgery.Do not go into pools or hot tubs until cleared by your doctor.      Ask when it is safe for you to drive. It is generally safe to drive after 2 weeks and when no longer taking prescription pain medication.    Ask when you may return to work and to other regular activities.    Wound care: Care for your abdominal incisions as directed. Carefully wash around the wound with soap and water. If you have Hibiclens or medicated soap that you were instructed to use before surgery, you may use that to wash with for up to 2 days  after surgery.  If not, any mild non-scented, non-abrasive soap is safe.  It is okay to let the soap and water run over your incision. Do not scrub your incision. Dry the area and put on new, clean bandages as directed. Change your bandages when they get wet or dirty. If you have strips of medical tape, let them fall off on their own. It may take 7 to 14 days for them to fall off. Check your incision every day for redness, swelling, or pus.   Deep breathing: Take deep breaths and cough 10 times each hour. This will decrease your risk for a lung infection. Take a deep breath and hold it for as long as you can. Let the air out and then cough strongly. Deep breaths help open your airway. You may be given an incentive spirometer to help you take deep breaths. Put the plastic piece in your mouth and take a slow, deep breath, then let the air out and cough. Repeat these steps 10 times every hour.   Get support: This surgery may be life-changing for you and your family. You will no longer be able to get pregnant. Sudden changes in the levels of your hormones may occur and cause mood swings and depression. You may feel angry, sad, or frightened, or cry frequently and unexpectedly. These feelings are normal. Talk to your healthcare provider about where you can get support. You can also ask if hormone replacement medicine is right for you.   Follow up with your healthcare provider or gynecologist as directed: You may need to return to have stitches removed, and for other tests. Write down your questions so you remember to ask them during your visits.      © 2017 [employer] Information is for End User's use only and may not be sold, redistributed or otherwise used for commercial purposes. All illustrations and images included in CareNotes® are the copyrighted property of [employer] or [employer].  The above information is an  only. It is not intended as medical advice for  individual conditions or treatments. Talk to your doctor, nurse or pharmacist before following any medical regimen to see if it is safe and effective for you.

## 2024-03-01 PROCEDURE — 88112 CYTOPATH CELL ENHANCE TECH: CPT | Performed by: PATHOLOGY

## 2024-03-05 ENCOUNTER — TELEPHONE (OUTPATIENT)
Dept: HEMATOLOGY ONCOLOGY | Facility: CLINIC | Age: 44
End: 2024-03-05

## 2024-03-05 ENCOUNTER — DOCUMENTATION (OUTPATIENT)
Dept: HEMATOLOGY ONCOLOGY | Facility: CLINIC | Age: 44
End: 2024-03-05

## 2024-03-05 PROCEDURE — 88307 TISSUE EXAM BY PATHOLOGIST: CPT | Performed by: PATHOLOGY

## 2024-03-05 PROCEDURE — 88341 IMHCHEM/IMCYTCHM EA ADD ANTB: CPT | Performed by: PATHOLOGY

## 2024-03-05 PROCEDURE — 88309 TISSUE EXAM BY PATHOLOGIST: CPT | Performed by: PATHOLOGY

## 2024-03-05 PROCEDURE — 88342 IMHCHEM/IMCYTCHM 1ST ANTB: CPT | Performed by: PATHOLOGY

## 2024-03-05 PROCEDURE — 88344 IMHCHEM/IMCYTCHM EA MLT ANTB: CPT | Performed by: PATHOLOGY

## 2024-03-05 NOTE — TELEPHONE ENCOUNTER
Return call placed is experiencing scant vaginal spotting.  Etiology of vaginal spotting explained.  Reviewed anticipated post op course and expectations during recovery.  Mild expected post op discomforts encouraged to take tylenol/ibuprofen regimen as ordered as needed.  Encouraged to call as needed.

## 2024-03-05 NOTE — PROGRESS NOTES
In-basket message received from Dr. Nuñez to add patient to the gyn [facility] on 3/11/2024. Chart reviewed and prep completed.

## 2024-03-05 NOTE — TELEPHONE ENCOUNTER
Patient Call    Who are you speaking with? Patient    If it is not the patient, are they listed on an active communication consent form? Yes   What is the reason for this call? Patient is having a lil bit of pain and bleeding very lightly. Said it started about 2am   Does this require a call back? Yes   If a call back is required, please list best call back number [phone]   If a call back is required, advise that a message will be forwarded to their care team and someone will return their call as soon as possible.   Did you relay this information to the patient? Yes

## 2024-03-12 ENCOUNTER — HOSPITAL ENCOUNTER (INPATIENT)
Facility: HOSPITAL | Age: 44
LOS: 3 days | Discharge: HOME | DRG: 920 | End: 2024-03-15
Attending: EMERGENCY MEDICINE | Admitting: OBSTETRICS & GYNECOLOGY
Payer: COMMERCIAL

## 2024-03-12 ENCOUNTER — APPOINTMENT (INPATIENT)
Dept: RADIOLOGY | Facility: HOSPITAL | Age: 44
DRG: 920 | End: 2024-03-12
Payer: COMMERCIAL

## 2024-03-12 ENCOUNTER — APPOINTMENT (EMERGENCY)
Dept: RADIOLOGY | Facility: HOSPITAL | Age: 44
DRG: 920 | End: 2024-03-12
Payer: COMMERCIAL

## 2024-03-12 ENCOUNTER — TELEPHONE (OUTPATIENT)
Dept: OTHER | Facility: OTHER | Age: 44
End: 2024-03-12

## 2024-03-12 DIAGNOSIS — T81.43XA POSTPROCEDURAL INTRAABDOMINAL ABSCESS: ICD-10-CM

## 2024-03-12 DIAGNOSIS — R50.9 FEVER, UNSPECIFIED FEVER CAUSE: ICD-10-CM

## 2024-03-12 DIAGNOSIS — N93.9 VAGINAL BLEEDING: Primary | ICD-10-CM

## 2024-03-12 LAB
ABO GROUP BLD: NORMAL
ALBUMIN SERPL BCP-MCNC: 3.4 G/DL (ref 3.5–5)
ALP SERPL-CCNC: 88 U/L (ref 34–104)
ALT SERPL W P-5'-P-CCNC: 11 U/L (ref 7–52)
ANION GAP SERPL CALCULATED.3IONS-SCNC: 11 MMOL/L
APTT PPP: 38 SECONDS (ref 23–37)
AST SERPL W P-5'-P-CCNC: 13 U/L (ref 13–39)
BASOPHILS # BLD AUTO: 0.04 THOUSANDS/ÂΜL (ref 0–0.1)
BASOPHILS # BLD AUTO: 0.05 THOUSANDS/ÂΜL (ref 0–0.1)
BASOPHILS NFR BLD AUTO: 0 % (ref 0–1)
BASOPHILS NFR BLD AUTO: 0 % (ref 0–1)
BILIRUB SERPL-MCNC: 0.44 MG/DL (ref 0.2–1)
BLD GP AB SCN SERPL QL: NEGATIVE
BUN SERPL-MCNC: 19 MG/DL (ref 5–25)
CALCIUM ALBUM COR SERPL-MCNC: 9.5 MG/DL (ref 8.3–10.1)
CALCIUM SERPL-MCNC: 9 MG/DL (ref 8.4–10.2)
CHLORIDE SERPL-SCNC: 98 MMOL/L (ref 96–108)
CO2 SERPL-SCNC: 27 MMOL/L (ref 21–32)
CREAT SERPL-MCNC: 0.95 MG/DL (ref 0.6–1.3)
EOSINOPHIL # BLD AUTO: 0.03 THOUSAND/ÂΜL (ref 0–0.61)
EOSINOPHIL # BLD AUTO: 0.12 THOUSAND/ÂΜL (ref 0–0.61)
EOSINOPHIL NFR BLD AUTO: 0 % (ref 0–6)
EOSINOPHIL NFR BLD AUTO: 1 % (ref 0–6)
ERYTHROCYTE [DISTWIDTH] IN BLOOD BY AUTOMATED COUNT: 12.9 % (ref 11.6–15.1)
ERYTHROCYTE [DISTWIDTH] IN BLOOD BY AUTOMATED COUNT: 13 % (ref 11.6–15.1)
GFR SERPL CREATININE-BSD FRML MDRD: 73 ML/MIN/1.73SQ M
GLUCOSE SERPL-MCNC: 140 MG/DL (ref 65–140)
HCT VFR BLD AUTO: 28.4 % (ref 34.8–46.1)
HCT VFR BLD AUTO: 33.3 % (ref 34.8–46.1)
HGB BLD-MCNC: 10.6 G/DL (ref 11.5–15.4)
HGB BLD-MCNC: 9.2 G/DL (ref 11.5–15.4)
IMM GRANULOCYTES # BLD AUTO: 0.18 THOUSAND/UL (ref 0–0.2)
IMM GRANULOCYTES # BLD AUTO: 0.22 THOUSAND/UL (ref 0–0.2)
IMM GRANULOCYTES NFR BLD AUTO: 1 % (ref 0–2)
IMM GRANULOCYTES NFR BLD AUTO: 1 % (ref 0–2)
INR PPP: 1.23 (ref 0.84–1.19)
INR PPP: 1.27 (ref 0.84–1.19)
LYMPHOCYTES # BLD AUTO: 1.64 THOUSANDS/ÂΜL (ref 0.6–4.47)
LYMPHOCYTES # BLD AUTO: 2.1 THOUSANDS/ÂΜL (ref 0.6–4.47)
LYMPHOCYTES NFR BLD AUTO: 13 % (ref 14–44)
LYMPHOCYTES NFR BLD AUTO: 8 % (ref 14–44)
MCH RBC QN AUTO: 28.1 PG (ref 26.8–34.3)
MCH RBC QN AUTO: 28.1 PG (ref 26.8–34.3)
MCHC RBC AUTO-ENTMCNC: 31.8 G/DL (ref 31.4–37.4)
MCHC RBC AUTO-ENTMCNC: 32.4 G/DL (ref 31.4–37.4)
MCV RBC AUTO: 87 FL (ref 82–98)
MCV RBC AUTO: 88 FL (ref 82–98)
MONOCYTES # BLD AUTO: 0.52 THOUSAND/ÂΜL (ref 0.17–1.22)
MONOCYTES # BLD AUTO: 0.79 THOUSAND/ÂΜL (ref 0.17–1.22)
MONOCYTES NFR BLD AUTO: 3 % (ref 4–12)
MONOCYTES NFR BLD AUTO: 5 % (ref 4–12)
NEUTROPHILS # BLD AUTO: 12.93 THOUSANDS/ÂΜL (ref 1.85–7.62)
NEUTROPHILS # BLD AUTO: 17.35 THOUSANDS/ÂΜL (ref 1.85–7.62)
NEUTS SEG NFR BLD AUTO: 80 % (ref 43–75)
NEUTS SEG NFR BLD AUTO: 88 % (ref 43–75)
NRBC BLD AUTO-RTO: 0 /100 WBCS
NRBC BLD AUTO-RTO: 0 /100 WBCS
PLATELET # BLD AUTO: 590 THOUSANDS/UL (ref 149–390)
PLATELET # BLD AUTO: 667 THOUSANDS/UL (ref 149–390)
PMV BLD AUTO: 8.9 FL (ref 8.9–12.7)
PMV BLD AUTO: 9 FL (ref 8.9–12.7)
POTASSIUM SERPL-SCNC: 4.1 MMOL/L (ref 3.5–5.3)
PROT SERPL-MCNC: 7.3 G/DL (ref 6.4–8.4)
PROTHROMBIN TIME: 15.4 SECONDS (ref 11.6–14.5)
PROTHROMBIN TIME: 15.8 SECONDS (ref 11.6–14.5)
RBC # BLD AUTO: 3.27 MILLION/UL (ref 3.81–5.12)
RBC # BLD AUTO: 3.77 MILLION/UL (ref 3.81–5.12)
RH BLD: POSITIVE
SODIUM SERPL-SCNC: 136 MMOL/L (ref 135–147)
SPECIMEN EXPIRATION DATE: NORMAL
WBC # BLD AUTO: 16.21 THOUSAND/UL (ref 4.31–10.16)
WBC # BLD AUTO: 19.76 THOUSAND/UL (ref 4.31–10.16)

## 2024-03-12 PROCEDURE — 96375 TX/PRO/DX INJ NEW DRUG ADDON: CPT

## 2024-03-12 PROCEDURE — 99024 POSTOP FOLLOW-UP VISIT: CPT | Performed by: OBSTETRICS & GYNECOLOGY

## 2024-03-12 PROCEDURE — 36415 COLL VENOUS BLD VENIPUNCTURE: CPT

## 2024-03-12 PROCEDURE — 99285 EMERGENCY DEPT VISIT HI MDM: CPT | Performed by: EMERGENCY MEDICINE

## 2024-03-12 PROCEDURE — 85730 THROMBOPLASTIN TIME PARTIAL: CPT

## 2024-03-12 PROCEDURE — 0W9J3ZZ DRAINAGE OF PELVIC CAVITY, PERCUTANEOUS APPROACH: ICD-10-PCS | Performed by: RADIOLOGY

## 2024-03-12 PROCEDURE — 10160 PNXR ASPIR ABSC HMTMA BULLA: CPT | Performed by: RADIOLOGY

## 2024-03-12 PROCEDURE — 86900 BLOOD TYPING SEROLOGIC ABO: CPT

## 2024-03-12 PROCEDURE — 87070 CULTURE OTHR SPECIMN AEROBIC: CPT | Performed by: OBSTETRICS & GYNECOLOGY

## 2024-03-12 PROCEDURE — 87075 CULTR BACTERIA EXCEPT BLOOD: CPT | Performed by: OBSTETRICS & GYNECOLOGY

## 2024-03-12 PROCEDURE — 96365 THER/PROPH/DIAG IV INF INIT: CPT

## 2024-03-12 PROCEDURE — 87205 SMEAR GRAM STAIN: CPT | Performed by: OBSTETRICS & GYNECOLOGY

## 2024-03-12 PROCEDURE — 87040 BLOOD CULTURE FOR BACTERIA: CPT

## 2024-03-12 PROCEDURE — 10030 IMG GID FLU COLL DRG SFT TIS: CPT

## 2024-03-12 PROCEDURE — 99284 EMERGENCY DEPT VISIT MOD MDM: CPT

## 2024-03-12 PROCEDURE — 86850 RBC ANTIBODY SCREEN: CPT

## 2024-03-12 PROCEDURE — 80053 COMPREHEN METABOLIC PANEL: CPT

## 2024-03-12 PROCEDURE — 85025 COMPLETE CBC W/AUTO DIFF WBC: CPT

## 2024-03-12 PROCEDURE — 86901 BLOOD TYPING SEROLOGIC RH(D): CPT

## 2024-03-12 PROCEDURE — 85610 PROTHROMBIN TIME: CPT

## 2024-03-12 PROCEDURE — 77012 CT SCAN FOR NEEDLE BIOPSY: CPT | Performed by: RADIOLOGY

## 2024-03-12 PROCEDURE — 74177 CT ABD & PELVIS W/CONTRAST: CPT

## 2024-03-12 PROCEDURE — 87102 FUNGUS ISOLATION CULTURE: CPT | Performed by: OBSTETRICS & GYNECOLOGY

## 2024-03-12 PROCEDURE — NC001 PR NO CHARGE: Performed by: NURSE PRACTITIONER

## 2024-03-12 RX ORDER — ENOXAPARIN SODIUM 100 MG/ML
60 INJECTION SUBCUTANEOUS 2 TIMES DAILY
Status: DISCONTINUED | OUTPATIENT
Start: 2024-03-12 | End: 2024-03-15 | Stop reason: HOSPADM

## 2024-03-12 RX ORDER — SODIUM CHLORIDE, SODIUM GLUCONATE, SODIUM ACETATE, POTASSIUM CHLORIDE, MAGNESIUM CHLORIDE, SODIUM PHOSPHATE, DIBASIC, AND POTASSIUM PHOSPHATE .53; .5; .37; .037; .03; .012; .00082 G/100ML; G/100ML; G/100ML; G/100ML; G/100ML; G/100ML; G/100ML
1000 INJECTION, SOLUTION INTRAVENOUS ONCE
Status: COMPLETED | OUTPATIENT
Start: 2024-03-12 | End: 2024-03-12

## 2024-03-12 RX ORDER — ACETAMINOPHEN 325 MG/1
975 TABLET ORAL EVERY 6 HOURS PRN
Status: DISCONTINUED | OUTPATIENT
Start: 2024-03-12 | End: 2024-03-15 | Stop reason: HOSPADM

## 2024-03-12 RX ORDER — PRAVASTATIN SODIUM 40 MG
40 TABLET ORAL
Status: DISCONTINUED | OUTPATIENT
Start: 2024-03-12 | End: 2024-03-15 | Stop reason: HOSPADM

## 2024-03-12 RX ORDER — LIDOCAINE WITH 8.4% SOD BICARB 0.9%(10ML)
SYRINGE (ML) INJECTION AS NEEDED
Status: COMPLETED | OUTPATIENT
Start: 2024-03-12 | End: 2024-03-12

## 2024-03-12 RX ORDER — ALBUTEROL SULFATE 90 UG/1
2 AEROSOL, METERED RESPIRATORY (INHALATION) EVERY 6 HOURS PRN
Status: DISCONTINUED | OUTPATIENT
Start: 2024-03-12 | End: 2024-03-15 | Stop reason: HOSPADM

## 2024-03-12 RX ORDER — FLUOXETINE HYDROCHLORIDE 20 MG/1
40 CAPSULE ORAL
Status: DISCONTINUED | OUTPATIENT
Start: 2024-03-12 | End: 2024-03-15 | Stop reason: HOSPADM

## 2024-03-12 RX ORDER — FLUOXETINE HYDROCHLORIDE 20 MG/1
40 CAPSULE ORAL DAILY
Status: DISCONTINUED | OUTPATIENT
Start: 2024-03-12 | End: 2024-03-12

## 2024-03-12 RX ORDER — ENOXAPARIN SODIUM 100 MG/ML
60 INJECTION SUBCUTANEOUS 2 TIMES DAILY
Status: DISCONTINUED | OUTPATIENT
Start: 2024-03-12 | End: 2024-03-12

## 2024-03-12 RX ORDER — ONDANSETRON 2 MG/ML
4 INJECTION INTRAMUSCULAR; INTRAVENOUS EVERY 6 HOURS PRN
Status: DISCONTINUED | OUTPATIENT
Start: 2024-03-12 | End: 2024-03-15 | Stop reason: HOSPADM

## 2024-03-12 RX ORDER — DIAZEPAM 5 MG/ML
2.5 INJECTION, SOLUTION INTRAMUSCULAR; INTRAVENOUS ONCE
Status: COMPLETED | OUTPATIENT
Start: 2024-03-12 | End: 2024-03-12

## 2024-03-12 RX ADMIN — SODIUM CHLORIDE, SODIUM GLUCONATE, SODIUM ACETATE, POTASSIUM CHLORIDE, MAGNESIUM CHLORIDE, SODIUM PHOSPHATE, DIBASIC, AND POTASSIUM PHOSPHATE 1000 ML: .53; .5; .37; .037; .03; .012; .00082 INJECTION, SOLUTION INTRAVENOUS at 05:33

## 2024-03-12 RX ADMIN — DIAZEPAM 2.5 MG: 10 INJECTION, SOLUTION INTRAMUSCULAR; INTRAVENOUS at 05:33

## 2024-03-12 RX ADMIN — FLUOXETINE 40 MG: 20 CAPSULE ORAL at 22:38

## 2024-03-12 RX ADMIN — Medication 10 ML: at 11:28

## 2024-03-12 RX ADMIN — IOHEXOL 100 ML: 350 INJECTION, SOLUTION INTRAVENOUS at 06:21

## 2024-03-12 RX ADMIN — ACETAMINOPHEN 975 MG: 325 TABLET, FILM COATED ORAL at 13:57

## 2024-03-12 RX ADMIN — PIPERACILLIN SODIUM AND TAZOBACTAM SODIUM 4.5 G: 36; 4.5 INJECTION, POWDER, LYOPHILIZED, FOR SOLUTION INTRAVENOUS at 18:30

## 2024-03-12 RX ADMIN — PIPERACILLIN SODIUM AND TAZOBACTAM SODIUM 4.5 G: 36; 4.5 INJECTION, POWDER, LYOPHILIZED, FOR SOLUTION INTRAVENOUS at 19:51

## 2024-03-12 RX ADMIN — ENOXAPARIN SODIUM 60 MG: 60 INJECTION SUBCUTANEOUS at 17:02

## 2024-03-12 RX ADMIN — PRAVASTATIN SODIUM 40 MG: 40 TABLET ORAL at 16:59

## 2024-03-12 NOTE — CONSULTS
e-Consult ([facility])  - Interventional Radiology  Antonella Irving 43 y.o. female MRN: [id number]  Unit/Bed#: ED 01 Encounter: [id number]      Interventional Radiology has been consulted to evaluate Antonella Irving    We were consulted by gynecology concerning this patient with pelvic collections 2 weeks post hysterectomy.    Inpatient Consult to IR  Consult performed by: MICHAELLE Wilson  Consult ordered by: Gurpreet Springer MD      03/12/24    Assessment/Recommendation:     43-year-old male with a history of endometrial cancer status post laparoscopic total hysterectomy 2/27/2024 presented to the emergency department with complaints of vaginal bleeding with clots, abdominal tenderness and generalized weakness.  CT abdomen pelvis performed revealing several pelvic abscesses ranging from 5.7 to 7.2 cm in size.    Noted leukocytosis on CBC this a.m.; WBC count 16,000.  Appears hemodynamically stable.  Afebrile.  Interventional radiology has been consulted for evaluation and possible    Interventional radiology has been consulted for evaluation and possible drainage of pelvic abscesses.             Reviewed diagnostic imaging and laboratory findings  Discussed case with Dr. Luque  Plan for CT guided aspiration of anterior abscess.    Continue antibiotics to evaluate for resolution of deeper pelvic collections. Ideally, avoid drainage of deeper pelvic collections as they are surrounded by vessels   Maintain NPO  Hold Lovenox today   Remainder of care per  gynecology and primary service teams   Please do not hesitate to contact IR for concerns/questions      21-30 minutes, >50% of the total time devoted to medical consultative verbal/EMR discussion between providers. Written report will be generated in the EMR.     Thank you for allowing Interventional Radiology to participate in the care of Antonella Irving.     MICHAELLE Wilson

## 2024-03-12 NOTE — NURSING NOTE
Blood culture 1/2 collected. Pt did not wish for staff to collect second set. Gyn-Onc aware. Ok to give IV Zosyn.

## 2024-03-12 NOTE — TELEPHONE ENCOUNTER
Patient called in stating that they were hemoraghing from the surgery they had and are on their way to the ER. Paged on call via TC.

## 2024-03-12 NOTE — ED NOTES
Pt requesting to not have blood cultures done at this time due to difficulty obtaining IV's earlier. Dr Terry aware, OK to hold off on blood cultures at this time.      Luisa Garcia RN  03/12/24 8821

## 2024-03-12 NOTE — H&P
H&P Exam - Gynecology Oncology  Antonella Irving 43 y.o. female MRN: [id number]  Unit/Bed#: ED 01 Encounter: [id number]      Assessment/Plan     A/P: Endometrial carcinoma now s/p RA-TLH, BSO, SLND  -CTAP with several pelvic collections noted   -WBC 16k   -Plan to consult IR for consideration of drainage   -NPO    Vaginal bleeding  -Minimal bleeding on exam, cuff intact  -Hgb 10.6, appropriate drop after surgery  -Coags only slightly elevated    History of Present Illness     HPI:  Antonella Irving is a 43 y.o. female s/p RA-TLH, BSO, and SLND on 2/27 who presents after an episode of vaginal bleeding this morning. She has been having bleeding for the last week, but this morning passed a large clot, and she was worried. Before this morning, she would describe it as a light period. She felt pretty lightheaded this morning after passing the clot but has felt better since arriving here. She has not been having very much pain, just occasional soreness. She has not had any issues urinating and has normal bowel movements, other than one episode of loose stool this morning. She denies fevers/chills, chest pain, shortness of breath, nausea/vomiting, dysuria, hematuria.     CTAP was performed and several collections subsequently noted in pelvis.     Oncology History   Endometrial carcinoma (HCC)   1/26/2024 Initial Diagnosis    Endometrial carcinoma (HCC)     1/29/2024 -  Cancer Staged    Staging form: Corpus Uteri - Carcinoma, AJCC 8th Edition  - Clinical stage from 1/29/2024: FIGO Stage I (cT1, cM0) - Signed by Faustino Millan MD on 1/29/2024  Histopathologic type: Endometrioid adenocarcinoma, NOS  Stage prefix: Initial diagnosis  Histologic grade (G): G1  Histologic grading system: 3 grade system       2/27/2024 Surgery    RA-TLH/BSO/SLND    pMMR         Review of Systems   Constitutional:  Negative for chills and fever.   Eyes:  Negative for visual disturbance.   Respiratory:  Negative for shortness of breath.     Cardiovascular:  Negative for chest pain.   Gastrointestinal:  Negative for abdominal pain, diarrhea, nausea and vomiting.   Genitourinary:  Positive for vaginal bleeding. Negative for dysuria, flank pain, hematuria and vaginal discharge.   Skin:  Negative for rash.   Neurological:  Negative for dizziness, numbness and headaches.   All other systems reviewed and are negative.      Historical Information   Past Medical History:   Diagnosis Date    Asthma     Depression     Hyperlipemia     Hypertension     Obesity, morbid, BMI 50 or higher (HCC)     Papanicolaou smear of cervix with positive high risk human papilloma virus (HPV) test     12/2023 pap negative, + HPV type 16;  COLPO:    Sleep apnea      Past Surgical History:   Procedure Laterality Date    HERNIA REPAIR  1999    NM INJECTION PROCEDURE LYMPHANGIOGRAPHY  2/27/2024    Procedure: LYMPHOGRAPHY WITH INDOCYANINE GREEN (ICG);  Surgeon: Daria Nuñez MD;  Location: [facility];  Service: Gynecology Oncology    NM LAPS TOTAL HYSTERECT 250 GM/< W/RMVL TUBE/OVARY N/A 2/27/2024    Procedure: HYSTERECTOMY LAPAROSCOPIC TOTAL (LTH) W/ ROBOTICS, BILATERAL SALPINGO-OOPHORECTOMY, LYMPH NODE DISSECTION;  Surgeon: Daria Nuñez MD;  Location: [facility];  Service: Gynecology Oncology     OB/GYN History:   Family History   Problem Relation Age of Onset    Hypertension Mother     Diabetes Mother         Mellitus    Pancreatic cancer Father     Diabetes Father         Mellitus    Coronary artery disease Father     Cancer Father         Pancreatic    No Known Problems Sister     No Known Problems Sister     No Known Problems Sister     No Known Problems Sister     No Known Problems Brother     No Known Problems Brother     No Known Problems Brother     No Known Problems Maternal Grandmother     No Known Problems Maternal Grandfather     Breast cancer Paternal Grandmother     Cancer Paternal Grandmother         Breast    No Known Problems Paternal Grandfather     Cervical  cancer Maternal Aunt     Cancer Maternal Aunt         cervical    Breast cancer Paternal Aunt 64    Diabetes Family         Mellitus    Colon cancer Neg Hx     Ovarian cancer Neg Hx     Uterine cancer Neg Hx      Social History   Social History     Substance and Sexual Activity   Alcohol Use Not Currently     Social History     Substance and Sexual Activity   Drug Use Not Currently    Frequency: 1.0 times per week    Types: Marijuana    Comment: usage in highschool     Social History     Tobacco Use   Smoking Status Former    Current packs/day: 0.00    Types: Cigarettes    Quit date: 2005    Years since quittin.2   Smokeless Tobacco Never   Tobacco Comments    10 per day. No passive smoke exposure       Meds/Allergies   (Not in a hospital admission)    Allergies   Allergen Reactions    Shellfish Allergy - Food Allergy Throat Swelling       Objective   BP 98/55   Pulse 81   Temp 97.6 °F (36.4 °C) (Tympanic)   Resp 18   LMP 2024   SpO2 98%     No intake or output data in the 24 hours ending 24 0754    Physical Exam  Vitals reviewed.   Constitutional:       General: She is not in acute distress.     Appearance: She is obese. She is not toxic-appearing.   HENT:      Head: Normocephalic and atraumatic.   Eyes:      Extraocular Movements: Extraocular movements intact.   Cardiovascular:      Rate and Rhythm: Normal rate and regular rhythm.      Pulses: Normal pulses.      Heart sounds: Normal heart sounds.   Pulmonary:      Effort: Pulmonary effort is normal. No respiratory distress.   Abdominal:      General: There is no distension.      Palpations: Abdomen is soft.      Tenderness: There is no abdominal tenderness. There is no guarding or rebound.      Comments: Port site incisions c/d/i   Genitourinary:     General: Normal vulva.      Vagina: No vaginal discharge.      Comments: Cuff intact, no blood in vaginal vault  Musculoskeletal:         General: Normal range of motion.      Cervical back:  Normal range of motion and neck supple.      Right lower leg: No edema.      Left lower leg: No edema.   Skin:     General: Skin is warm and dry.   Neurological:      General: No focal deficit present.      Mental Status: She is alert. Mental status is at baseline.   Psychiatric:         Mood and Affect: Mood normal.         Behavior: Behavior normal.         Lab Results:   Admission on 03/12/2024   Component Date Value    Sodium 03/12/2024 136     Potassium 03/12/2024 4.1     Chloride 03/12/2024 98     CO2 03/12/2024 27     ANION GAP 03/12/2024 11     BUN 03/12/2024 19     Creatinine 03/12/2024 0.95     Glucose 03/12/2024 140     Calcium 03/12/2024 9.0     Corrected Calcium 03/12/2024 9.5     AST 03/12/2024 13     ALT 03/12/2024 11     Alkaline Phosphatase 03/12/2024 88     Total Protein 03/12/2024 7.3     Albumin 03/12/2024 3.4 (L)     Total Bilirubin 03/12/2024 0.44     eGFR 03/12/2024 73     WBC 03/12/2024 16.21 (H)     RBC 03/12/2024 3.77 (L)     Hemoglobin 03/12/2024 10.6 (L)     Hematocrit 03/12/2024 33.3 (L)     MCV 03/12/2024 88     MCH 03/12/2024 28.1     MCHC 03/12/2024 31.8     RDW 03/12/2024 12.9     MPV 03/12/2024 9.0     Platelets 03/12/2024 667 (H)     nRBC 03/12/2024 0     Neutrophils Relative 03/12/2024 80 (H)     Immat GRANS % 03/12/2024 1     Lymphocytes Relative 03/12/2024 13 (L)     Monocytes Relative 03/12/2024 5     Eosinophils Relative 03/12/2024 1     Basophils Relative 03/12/2024 0     Neutrophils Absolute 03/12/2024 12.93 (H)     Immature Grans Absolute 03/12/2024 0.22 (H)     Lymphocytes Absolute 03/12/2024 2.10     Monocytes Absolute 03/12/2024 0.79     Eosinophils Absolute 03/12/2024 0.12     Basophils Absolute 03/12/2024 0.05     Protime 03/12/2024 15.4 (H)     INR 03/12/2024 1.23 (H)     PTT 03/12/2024 38 (H)       Imaging: I have personally reviewed pertinent reports.   and I have personally reviewed pertinent films in PACS    Code Status: No Order    Gurpreet Springer,  MD  3/12/2024  7:54 AM

## 2024-03-12 NOTE — BRIEF OP NOTE (RAD/CATH)
INTERVENTIONAL RADIOLOGY PROCEDURE NOTE    Date: 3/12/2024    Procedure: Abdominal fluid collection aspiration  Procedure Summary       Date:  Room / Location:     Anesthesia Start:  Anesthesia Stop:     Procedure:  Diagnosis:     Scheduled Providers:  Responsible Provider:     Anesthesia Type: Not recorded ASA Status: Not recorded            Preoperative diagnosis:   1. Vaginal bleeding    2. Postprocedural intraabdominal abscess         Postoperative diagnosis: Same.    Surgeon: Jameson Luque MD     Assistant: None. No qualified resident was available.    Blood loss: None    Specimens: 16 mL dark bloody fluid aspirate and sent to lab for cultures.     Findings: Successful anterior low abdomen fluid collection aspiration.    Complications: None immediate.    Anesthesia: local

## 2024-03-12 NOTE — ED ATTENDING ATTESTATION
3/12/2024  I, John Burgess MD, saw and evaluated the patient. I have discussed the patient with the resident/non-physician practitioner and agree with the resident's/non-physician practitioner's findings, Plan of Care, and MDM as documented in the resident's/non-physician practitioner's note, except where noted. All available labs and Radiology studies were reviewed.  I was present for key portions of any procedure(s) performed by the resident/non-physician practitioner and I was immediately available to provide assistance.       At this point I agree with the current assessment done in the Emergency Department.  I have conducted an independent evaluation of this patient a history and physical is as follows:    43-year-old female status post laparoscopic total hysterectomy with bilateral salpingo-oophorectomy and lymph node dissection by Dr. Nuñez on 2/27/2024 who presents to the emergency department for evaluation of vaginal bleeding.  Patient states the surgery went well without any immediate complication.  She has had some ongoing vaginal bleeding since then which she describes as light period.  She states the bleeding acutely worsened this morning.  She denies any lightheadedness, chest pain or shortness of breath.  No abdominal pain, nausea or vomiting.  She does not take any blood thinners or antiplatelet medications.    On exam, patient anxious appearing and pale, but in no acute distress, head is normocephalic atraumatic, pupils equal round reactive to light, neck is supple without meningismus signs, heart is regular rate and rhythm with intact distal pulses, no increased work of breathing, respiratory distress, or stridor.  Abdomen is soft with primarily left-sided abdominal tenderness.  There is ecchymosis in the left abdomen.  Surgical scars appear to be healing appropriately.  No active bleeding.  External  exam performed with nurse at bedside, there are signs of dried blood and clots, no active  large hemorrhage.    Postoperative bleeding: Will check CBC to evaluate for anemia, check coags and type and screen in case patient requires intervention or blood transfusion.  She does have abdominal tenderness, will get CT scan of the abdomen and pelvis to evaluate for possible postop complication.  Will discuss with GYN on-call.    Hemoglobin decreased from prior, patient with leukocytosis.  Patient signed out to morning team pending CT results and Gyn Onc evaluation    ED Course  ED Course as of 03/14/24 1606   Tue Mar 12, 2024   0551 Hemoglobin(!): 10.6  Decreased from 13.1   0552 WBC(!): 16.21         Critical Care Time  Procedures

## 2024-03-12 NOTE — SEDATION DOCUMENTATION
IR abdominal aspiration by . Patient tolerated procedure well. 16cc of dark bloody brown fluid aspirated and sent for cultures. Bandaid placed on site. Report called to MS 7 RN.

## 2024-03-12 NOTE — ED PROVIDER NOTES
History  Chief Complaint   Patient presents with    Post-op Problem     Pt had laparoscopic hysterectomy 2 weeks ago- pt denies any problems after until tonight she woke up around 4am covered in bright red blood and passed a large clot with several small ones. Denies being dizzy/lightheaded currently but c/o but lower abdominal pain     43-year-old female with a past medical history of endometrial cancer that was recently diagnosed, laparoscopic total hysterectomy on 2/27 2024 presents to the emergency department complaining of vaginal bleeding.  States she awoke this morning in a puddle of blood.  She noticed passage of large blood clots.  She states that she has a slow ooze coming from her introitus.  She complains of periumbilical tenderness.  She overall looks pale and complains of generalized weakness.  Patient is not on any blood thinners actively.    Is on chart review, patient has hypertension that is well-controlled, blood pressures are within the normal 120/70.          Prior to Admission Medications   Prescriptions Last Dose Informant Patient Reported? Taking?   FLUoxetine (PROzac) 40 MG capsule   No No   Sig: TAKE 1 CAPSULE BY MOUTH EVERY DAY   Zepbound 2.5 MG/0.5ML auto-injector  Self Yes No   Sig: Inject 2.5 mg under the skin once a week   Patient not taking: Reported on 1/29/2024   albuterol (Ventolin HFA) 90 mcg/act inhaler  Self No No   Sig: Inhale 2 puffs every 6 (six) hours as needed for wheezing   ergocalciferol (VITAMIN D2) 50,000 units  Self No No   Sig: TAKE 1 CAPSULE BY MOUTH ONE TIME PER WEEK   lisinopril-hydrochlorothiazide (PRINZIDE,ZESTORETIC) 20-25 MG per tablet  Self No No   Sig: TAKE 1 TABLET BY MOUTH EVERY DAY   meloxicam (MOBIC) 15 mg tablet  Self No No   Sig: Take 1 tablet (15 mg total) by mouth daily as needed for moderate pain   Patient not taking: Reported on 1/29/2024   rosuvastatin (CRESTOR) 5 mg tablet  Self No No   Sig: TAKE 1 TABLET (5 MG TOTAL) BY MOUTH DAILY.       Facility-Administered Medications: None       Past Medical History:   Diagnosis Date    Asthma     Depression     Hyperlipemia     Hypertension     Obesity, morbid, BMI 50 or higher (HCC)     Papanicolaou smear of cervix with positive high risk human papilloma virus (HPV) test     12/2023 pap negative, + HPV type 16;  COLPO:    Sleep apnea        Past Surgical History:   Procedure Laterality Date    HERNIA REPAIR  1999    IR ASPIRATION ONLY  3/12/2024    NV INJECTION PROCEDURE LYMPHANGIOGRAPHY  2/27/2024    Procedure: LYMPHOGRAPHY WITH INDOCYANINE GREEN (ICG);  Surgeon: Daria Nuñez MD;  Location: [facility];  Service: Gynecology Oncology    NV LAPS TOTAL HYSTERECT 250 GM/< W/RMVL TUBE/OVARY N/A 2/27/2024    Procedure: HYSTERECTOMY LAPAROSCOPIC TOTAL (LTH) W/ ROBOTICS, BILATERAL SALPINGO-OOPHORECTOMY, LYMPH NODE DISSECTION;  Surgeon: Daria Nuñez MD;  Location: [facility];  Service: Gynecology Oncology       Family History   Problem Relation Age of Onset    Hypertension Mother     Diabetes Mother         Mellitus    Pancreatic cancer Father     Diabetes Father         Mellitus    Coronary artery disease Father     Cancer Father         Pancreatic    No Known Problems Sister     No Known Problems Sister     No Known Problems Sister     No Known Problems Sister     No Known Problems Brother     No Known Problems Brother     No Known Problems Brother     No Known Problems Maternal Grandmother     No Known Problems Maternal Grandfather     Breast cancer Paternal Grandmother     Cancer Paternal Grandmother         Breast    No Known Problems Paternal Grandfather     Cervical cancer Maternal Aunt     Cancer Maternal Aunt         cervical    Breast cancer Paternal Aunt 64    Diabetes Family         Mellitus    Colon cancer Neg Hx     Ovarian cancer Neg Hx     Uterine cancer Neg Hx      I have reviewed and agree with the history as documented.    E-Cigarette/Vaping    E-Cigarette Use Never User      E-Cigarette/Vaping  Substances    Nicotine No     THC No     CBD No     Flavoring No     Other No     Unknown No      Social History     Tobacco Use    Smoking status: Former     Current packs/day: 0.00     Types: Cigarettes     Quit date: 2005     Years since quittin.2    Smokeless tobacco: Never    Tobacco comments:     10 per day. No passive smoke exposure   Vaping Use    Vaping status: Never Used   Substance Use Topics    Alcohol use: Not Currently    Drug use: Not Currently     Frequency: 1.0 times per week     Types: Marijuana     Comment: usage in highschool        Review of Systems   Gastrointestinal:  Positive for abdominal pain and nausea. Negative for vomiting.   Genitourinary:  Positive for vaginal bleeding. Negative for vaginal discharge and vaginal pain.   Neurological:  Positive for weakness.   All other systems reviewed and are negative.      Physical Exam  ED Triage Vitals   Temperature Pulse Respirations Blood Pressure SpO2   24 0522 24 0503 24 0503 24 0503 24 0503   97.6 °F (36.4 °C) 91 20 104/67 97 %      Temp Source Heart Rate Source Patient Position - Orthostatic VS BP Location FiO2 (%)   24 0522 24 0503 24 0503 24 0503 --   Tympanic Monitor Lying Left arm       Pain Score       24 0503       3             Orthostatic Vital Signs  Vitals:    24 0954 24 1213 24 1527 24 1527   BP: 128/70 122/70 109/64 109/64   Pulse: 89 99     Patient Position - Orthostatic VS: Lying          Physical Exam  Vitals and nursing note reviewed. Exam conducted with a chaperone present (Dr. Burgess).   Constitutional:       Appearance: She is well-developed. She is ill-appearing.   HENT:      Head: Normocephalic and atraumatic.   Eyes:      Conjunctiva/sclera: Conjunctivae normal.      Comments: Pale conjunctiva   Cardiovascular:      Rate and Rhythm: Normal rate and regular rhythm.      Heart sounds: No murmur heard.  Pulmonary:      Effort:  Pulmonary effort is normal. No respiratory distress.      Breath sounds: Normal breath sounds.   Abdominal:      Palpations: Abdomen is soft.      Tenderness: There is abdominal tenderness.   Genitourinary:     Vagina: Bleeding present.      Comments: Dried blood around the introitus.  No active bleeding on physical exam.  Musculoskeletal:         General: No swelling.      Cervical back: Neck supple.   Skin:     General: Skin is warm and dry.      Capillary Refill: Capillary refill takes less than 2 seconds.      Coloration: Skin is pale.   Neurological:      General: No focal deficit present.      Mental Status: She is alert and oriented to person, place, and time. Mental status is at baseline.   Psychiatric:         Mood and Affect: Mood normal.         ED Medications  Medications   pravastatin (PRAVACHOL) tablet 40 mg (has no administration in time range)   albuterol (PROVENTIL HFA,VENTOLIN HFA) inhaler 2 puff (has no administration in time range)   ondansetron (ZOFRAN) injection 4 mg (has no administration in time range)   enoxaparin (LOVENOX) subcutaneous injection 60 mg (has no administration in time range)   FLUoxetine (PROzac) capsule 40 mg (has no administration in time range)   acetaminophen (TYLENOL) tablet 975 mg (975 mg Oral Given 3/12/24 1357)   piperacillin-tazobactam (ZOSYN) 4.5 g in sodium chloride 0.9 % 100 mL IV LOADING DOSE (has no administration in time range)     And   piperacillin-tazobactam (ZOSYN) 4.5 g in sodium chloride 0.9 % 100 mL IVPB (EXTENDED INFUSION) (has no administration in time range)   multi-electrolyte (ISOLYTE-S PH 7.4) bolus 1,000 mL (0 mL Intravenous Stopped 3/12/24 0654)   diazepam (VALIUM) injection 2.5 mg (2.5 mg Intravenous Given 3/12/24 0533)   iohexol (OMNIPAQUE) 350 MG/ML injection (MULTI-DOSE) 100 mL (100 mL Intravenous Given 3/12/24 0621)   lidocaine 1% buffered (10 mL Infiltration Given 3/12/24 1128)       Diagnostic Studies  Results Reviewed       Procedure  Component Value Units Date/Time    Platelet count [[id number]]     Lab Status: No result Specimen: Blood     Blood culture [[id number]]     Lab Status: No result Specimen: Blood     Blood culture [[id number]]     Lab Status: No result Specimen: Blood     Comprehensive metabolic panel [[id number]]  (Abnormal) Collected: 03/12/24 0533    Lab Status: Final result Specimen: Blood from Arm, Right Updated: 03/12/24 0612     Sodium 136 mmol/L      Potassium 4.1 mmol/L      Chloride 98 mmol/L      CO2 27 mmol/L      ANION GAP 11 mmol/L      BUN 19 mg/dL      Creatinine 0.95 mg/dL      Glucose 140 mg/dL      Calcium 9.0 mg/dL      Corrected Calcium 9.5 mg/dL      AST 13 U/L      ALT 11 U/L      Alkaline Phosphatase 88 U/L      Total Protein 7.3 g/dL      Albumin 3.4 g/dL      Total Bilirubin 0.44 mg/dL      eGFR 73 ml/min/1.73sq m     Narrative:      [employer] guidelines for Chronic Kidney Disease (CKD):     Stage 1 with normal or high GFR (GFR > 90 mL/min/1.73 square meters)    Stage 2 Mild CKD (GFR = 60-89 mL/min/1.73 square meters)    Stage 3A Moderate CKD (GFR = 45-59 mL/min/1.73 square meters)    Stage 3B Moderate CKD (GFR = 30-44 mL/min/1.73 square meters)    Stage 4 Severe CKD (GFR = 15-29 mL/min/1.73 square meters)    Stage 5 End Stage CKD (GFR <15 mL/min/1.73 square meters)  Note: GFR calculation is accurate only with a steady state creatinine    Protime-INR [[id number]]  (Abnormal) Collected: 03/12/24 0533    Lab Status: Final result Specimen: Blood from Arm, Right Updated: 03/12/24 0606     Protime 15.4 seconds      INR 1.23    APTT [[id number]]  (Abnormal) Collected: 03/12/24 0533    Lab Status: Final result Specimen: Blood from Arm, Right Updated: 03/12/24 0606     PTT 38 seconds     CBC and differential [[id number]]  (Abnormal) Collected: 03/12/24 0533    Lab Status: Final result Specimen: Blood from Arm, Right Updated: 03/12/24 0550     WBC 16.21 Thousand/uL      RBC 3.77 Million/uL       Hemoglobin 10.6 g/dL      Hematocrit 33.3 %      MCV 88 fL      MCH 28.1 pg      MCHC 31.8 g/dL      RDW 12.9 %      MPV 9.0 fL      Platelets 667 Thousands/uL      nRBC 0 /100 WBCs      Neutrophils Relative 80 %      Immature Grans % 1 %      Lymphocytes Relative 13 %      Monocytes Relative 5 %      Eosinophils Relative 1 %      Basophils Relative 0 %      Neutrophils Absolute 12.93 Thousands/µL      Absolute Immature Grans 0.22 Thousand/uL      Absolute Lymphocytes 2.10 Thousands/µL      Absolute Monocytes 0.79 Thousand/µL      Eosinophils Absolute 0.12 Thousand/µL      Basophils Absolute 0.05 Thousands/µL                    IR aspiration only   Final Result by Jameson Luque MD (03/12 5414)      Anterior lower abdomen fluid collection aspiration.      Plan:      Follow-up culture results.      Workstation performed: [id number]         CT abdomen pelvis with contrast   Final Result by Paco Allen MD (03/12 4260)      Several pelvic abscesses ranging in size from 5.0 cm to 7.2 cm.         I personally discussed this study with Dr. Ascencio on 3/12/2024 7:19 AM.               Workstation performed: [id number]               Procedures  US Guided Peripheral IV    Date/Time: 3/12/2024 5:54 AM    Performed by: Karl Ascencio DO  Authorized by: Karl Ascencio DO    Patient location:  ED  Performed by:  Resident  Other Assisting Provider: No    Indications:     Indications: difficulty obtaining IV access      Image availability:  Not obtained due to urgency  Procedure details:     Patient evaluated for contraindications to access (i.e. fistula, thrombosis, etc): Yes      Standard clean technique used for ultrasound access: Yes      Location:  Right forearm    Catheter size:  18 gauge    Number of attempts:  1    Successful placement: yes    Post-procedure details:     Post-procedure:  Dressing applied    Assessment: free fluid flow and no signs of infiltration      Post-procedure complications: none       Patient tolerance of procedure:  Tolerated well, no immediate complications  US Guided Peripheral IV    Date/Time: 3/12/2024 5:55 AM    Performed by: Karl Ascencio DO  Authorized by: Karl Ascencio DO    Patient location:  ED  Performed by:  Resident  Other Assisting Provider: No    Indications:     Indications: difficulty obtaining IV access      Image availability:  Not obtained due to urgency  Procedure details:     Patient evaluated for contraindications to access (i.e. fistula, thrombosis, etc): Yes      Standard clean technique used for ultrasound access: Yes      Location:  Left antecubital    Catheter size:  18 gauge    Number of attempts:  1    Successful placement: yes    Post-procedure details:     Post-procedure:  Dressing applied    Assessment: free fluid flow and no signs of infiltration      Post-procedure complications: none      Patient tolerance of procedure:  Tolerated well, no immediate complications        ED Course  ED Course as of 03/12/24 1646   Tue Mar 12, 2024   0550 Hemoglobin(!): 10.6  3 g drop in hemoglobin compared to previous over 3 weeks.   0550 Absolute Neutrophils(!): 12.93   0550 Patient will receive 1 L isolate fluid at this time.   0614 Sodium: 136   0614 Potassium: 4.1   0614 Carbon Dioxide: 27   0614 Creatinine: 0.95   0614 PROTIME(!): 15.4   0614 POCT INR(!): 1.23   0614 PTT(!): 38   0719 3 pelvic abscesses 5cm to 7cm in size, IR should be able to access.    0722 CT abdomen pelvis with contrast  ABDOMINOPELVIC CAVITY: There are at least 3 discrete fluid collections with thickened enhancing walls and surrounding fat stranding suggestive of abscesses in the pelvis:  5.0 x 4.8 x 3.7 cm in the right paramedian anterior pelvis, images 2/166 and 601/69.     3.5 x 5.4 x 7.2 cm in the lower pelvis anteriorly, images 2/181 and 601/94.     5.8 x 4.8 x 6.7 cm in the lower pelvis posteriorly, images 2/182 and 601/110.     No ascites. No pneumoperitoneum. No lymphadenopathy.      VESSELS: Unremarkable for patient's age.     PELVIS     REPRODUCTIVE ORGANS: Post hysterectomy.     URINARY BLADDER: Unremarkable.     ABDOMINAL WALL/INGUINAL REGIONS: Unremarkable.     BONES: No acute fracture or suspicious osseous lesion.     IMPRESSION:     Several pelvic abscesses ranging in size from 5.0 cm to 7.2 cm.        I personally discussed this study with Dr. Ascencio on 3/12/2024 7:19 AM.              Workstation performed: [id number]     0725 Blood cultures ordered. Will need abx.    0727 PCP is [facility], might go [facility] as primary                                        Medical Decision Making      DDx: Intra-abdominal infection, intra-abdominal bleeding, postsurgical complication, acute blood loss anemia, doubt DIC    Plan: CT abdomen and pelvis, CMP, CBC, coags, 2 IVs, 1 L crystalloid, type and screen    CT scan concerning for intra-abdominal abscesses. GYN/onc was consulted and evaluated patient in ED. Pt informed of all test results. Joint decision making with patient and GYN ONC team in regards to Bcx. Will hold off as pt will most likely go to IR for procedure and drainage. Pt stable upon admission.     Amount and/or Complexity of Data Reviewed  Labs: ordered. Decision-making details documented in ED Course.  Radiology: ordered. Decision-making details documented in ED Course.    Risk  Prescription drug management.  Decision regarding hospitalization.          Disposition  Final diagnoses:   Vaginal bleeding   Postprocedural intraabdominal abscess     Time reflects when diagnosis was documented in both MDM as applicable and the Disposition within this note       Time User Action Codes Description Comment    3/12/2024  5:16 AM Karl Ascencio Add [N93.9] Vaginal bleeding     3/12/2024  8:33 AM Sylwia Ring Add [T81.43XA] Postprocedural intraabdominal abscess           ED Disposition       ED Disposition   Admit    Condition   Stable    Date/Time   Tue Mar 12, 2024  8:33 AM    Comment   Case was discussed  with Dr. Nuñez and the patient's admission status was agreed to be Admission Status: inpatient status to the service of Dr. Nuñez .               Follow-up Information    None         Current Discharge Medication List        CONTINUE these medications which have NOT CHANGED    Details   albuterol (Ventolin HFA) 90 mcg/act inhaler Inhale 2 puffs every 6 (six) hours as needed for wheezing  Qty: 18 g, Refills: 1    Comments: Substitution to a formulary equivalent within the same pharmaceutical class is authorized.  Associated Diagnoses: Acute bronchiolitis due to respiratory syncytial virus (RSV)      ergocalciferol (VITAMIN D2) 50,000 units TAKE 1 CAPSULE BY MOUTH ONE TIME PER WEEK  Qty: 12 capsule, Refills: 1    Associated Diagnoses: Vitamin D deficiency      FLUoxetine (PROzac) 40 MG capsule TAKE 1 CAPSULE BY MOUTH EVERY DAY  Qty: 90 capsule, Refills: 1    Associated Diagnoses: Depression, unspecified depression type      lisinopril-hydrochlorothiazide (PRINZIDE,ZESTORETIC) 20-25 MG per tablet TAKE 1 TABLET BY MOUTH EVERY DAY  Qty: 90 tablet, Refills: 1    Associated Diagnoses: Essential hypertension      meloxicam (MOBIC) 15 mg tablet Take 1 tablet (15 mg total) by mouth daily as needed for moderate pain  Qty: 30 tablet, Refills: 1    Associated Diagnoses: Greater trochanteric bursitis of right hip; Primary osteoarthritis of right knee      rosuvastatin (CRESTOR) 5 mg tablet TAKE 1 TABLET (5 MG TOTAL) BY MOUTH DAILY.  Qty: 90 tablet, Refills: 1    Associated Diagnoses: Other hyperlipidemia      Zepbound 2.5 MG/0.5ML auto-injector Inject 2.5 mg under the skin once a week           No discharge procedures on file.    PDMP Review         Value Time User    PDMP Reviewed  Yes 12/7/2021  9:03 AM Ruddy Taylor MD             ED Provider  Attending physically available and evaluated Antonella Irving. I managed the patient along with the ED Attending.    Electronically Signed by           Karl Ascencio DO  03/12/24  [id number]

## 2024-03-12 NOTE — ASSESSMENT & PLAN NOTE
S/p RA-TLH, BSO, lymph node dissection on 2/27  CTAP 3/12: several pelvic collections noted, s/p IR drainage of anterior collection, cultures pending  Temp 100.9 on 3/12-->101 F on 3/13.   Zosyn started 3/12 and blood cultures NG@24 hrs  WBC 13 > 19k > 17k > 14k > 13k  Regular diet  Tylenol PRN  Home meds: crestor, prozac, albuterol, HTN meds held

## 2024-03-12 NOTE — Clinical Note
Case was discussed with Dr. Capone and the patient's admission status was agreed to be Admission Status: inpatient status to the service of Dr. Capone .

## 2024-03-12 NOTE — DISCHARGE INSTRUCTIONS
Abscess/fluid collection Aspiration      WHAT YOU NEED TO KNOW:     Today you underwent a fluid collection/abscess aspiration. Usually the fluid is sent to the lab for culture.    You may have some pain associated with the puncture site.     The Procedure is performed with Local anesthesia (Lidocaine) and sometimes with local and IV Sedation.                               After you go Home:    Home care  These tips can help your wound heal:  The wound may drain for the first 2 days. Cover the wound with a clean dry dressing. Change the dressing if it becomes soaked with blood or pus.  If a gauze packing was placed inside the abscess pocket, you may be told to remove it yourself. You may do this in the shower. Once the packing is removed, you should wash the area in the shower, or clean the area as directed by your provider. Continue to do this until the skin opening has closed. Make sure you wash your hands after changing the packing or cleaning the wound.  If you were prescribed antibiotics, take them as directed until they are all gone.  You may use acetaminophen or ibuprofen to control pain, unless another pain medicine was prescribed. If you have liver disease or ever had a stomach ulcer, talk with your doctor before using these medicines.  Follow-up care  Follow up with your healthcare provider, or as advised. If a gauze packing was put in your wound, it should be removed in 1 to 2 days. Check your wound every day for any signs that the infection is getting worse. The signs are listed below.  When to seek medical advice  Call your healthcare provider right away if any of these occur:  Increasing redness or swelling  Red streaks in the skin leading away from the wound  Increasing local pain or swelling  Continued pus draining from the wound 2 days after treatment  Fever of 100.4ºF (38ºC) or higher, or as  directed by your healthcare provider  Abscess  returns when you are at home

## 2024-03-13 ENCOUNTER — PATIENT OUTREACH (OUTPATIENT)
Dept: CASE MANAGEMENT | Facility: HOSPITAL | Age: 44
End: 2024-03-13

## 2024-03-13 LAB
ALBUMIN SERPL BCP-MCNC: 3.2 G/DL (ref 3.5–5)
ALP SERPL-CCNC: 79 U/L (ref 34–104)
ALT SERPL W P-5'-P-CCNC: 10 U/L (ref 7–52)
ANION GAP SERPL CALCULATED.3IONS-SCNC: 10 MMOL/L (ref 4–13)
AST SERPL W P-5'-P-CCNC: 11 U/L (ref 13–39)
BASOPHILS # BLD AUTO: 0.05 THOUSANDS/ÂΜL (ref 0–0.1)
BASOPHILS NFR BLD AUTO: 0 % (ref 0–1)
BILIRUB SERPL-MCNC: 0.36 MG/DL (ref 0.2–1)
BUN SERPL-MCNC: 14 MG/DL (ref 5–25)
CALCIUM ALBUM COR SERPL-MCNC: 9.3 MG/DL (ref 8.3–10.1)
CALCIUM SERPL-MCNC: 8.7 MG/DL (ref 8.4–10.2)
CHLORIDE SERPL-SCNC: 99 MMOL/L (ref 96–108)
CO2 SERPL-SCNC: 27 MMOL/L (ref 21–32)
CREAT SERPL-MCNC: 0.91 MG/DL (ref 0.6–1.3)
EOSINOPHIL # BLD AUTO: 0.06 THOUSAND/ÂΜL (ref 0–0.61)
EOSINOPHIL NFR BLD AUTO: 0 % (ref 0–6)
ERYTHROCYTE [DISTWIDTH] IN BLOOD BY AUTOMATED COUNT: 13.2 % (ref 11.6–15.1)
GFR SERPL CREATININE-BSD FRML MDRD: 77 ML/MIN/1.73SQ M
GLUCOSE SERPL-MCNC: 124 MG/DL (ref 65–140)
HCT VFR BLD AUTO: 29 % (ref 34.8–46.1)
HGB BLD-MCNC: 9 G/DL (ref 11.5–15.4)
IMM GRANULOCYTES # BLD AUTO: 0.17 THOUSAND/UL (ref 0–0.2)
IMM GRANULOCYTES NFR BLD AUTO: 1 % (ref 0–2)
LYMPHOCYTES # BLD AUTO: 2.2 THOUSANDS/ÂΜL (ref 0.6–4.47)
LYMPHOCYTES NFR BLD AUTO: 13 % (ref 14–44)
MCH RBC QN AUTO: 27.5 PG (ref 26.8–34.3)
MCHC RBC AUTO-ENTMCNC: 31 G/DL (ref 31.4–37.4)
MCV RBC AUTO: 89 FL (ref 82–98)
MONOCYTES # BLD AUTO: 1.07 THOUSAND/ÂΜL (ref 0.17–1.22)
MONOCYTES NFR BLD AUTO: 6 % (ref 4–12)
NEUTROPHILS # BLD AUTO: 13.99 THOUSANDS/ÂΜL (ref 1.85–7.62)
NEUTS SEG NFR BLD AUTO: 80 % (ref 43–75)
NRBC BLD AUTO-RTO: 0 /100 WBCS
PLATELET # BLD AUTO: 646 THOUSANDS/UL (ref 149–390)
PMV BLD AUTO: 9 FL (ref 8.9–12.7)
POTASSIUM SERPL-SCNC: 4 MMOL/L (ref 3.5–5.3)
PROT SERPL-MCNC: 7.1 G/DL (ref 6.4–8.4)
RBC # BLD AUTO: 3.27 MILLION/UL (ref 3.81–5.12)
SODIUM SERPL-SCNC: 136 MMOL/L (ref 135–147)
WBC # BLD AUTO: 17.54 THOUSAND/UL (ref 4.31–10.16)

## 2024-03-13 PROCEDURE — 85025 COMPLETE CBC W/AUTO DIFF WBC: CPT

## 2024-03-13 PROCEDURE — 80053 COMPREHEN METABOLIC PANEL: CPT

## 2024-03-13 PROCEDURE — 99024 POSTOP FOLLOW-UP VISIT: CPT | Performed by: OBSTETRICS & GYNECOLOGY

## 2024-03-13 PROCEDURE — 87040 BLOOD CULTURE FOR BACTERIA: CPT

## 2024-03-13 RX ADMIN — ENOXAPARIN SODIUM 60 MG: 60 INJECTION SUBCUTANEOUS at 17:28

## 2024-03-13 RX ADMIN — ACETAMINOPHEN 975 MG: 325 TABLET, FILM COATED ORAL at 16:01

## 2024-03-13 RX ADMIN — PIPERACILLIN SODIUM AND TAZOBACTAM SODIUM 4.5 G: 36; 4.5 INJECTION, POWDER, LYOPHILIZED, FOR SOLUTION INTRAVENOUS at 12:26

## 2024-03-13 RX ADMIN — FLUOXETINE 40 MG: 20 CAPSULE ORAL at 21:27

## 2024-03-13 RX ADMIN — PRAVASTATIN SODIUM 40 MG: 40 TABLET ORAL at 17:28

## 2024-03-13 RX ADMIN — PIPERACILLIN SODIUM AND TAZOBACTAM SODIUM 4.5 G: 36; 4.5 INJECTION, POWDER, LYOPHILIZED, FOR SOLUTION INTRAVENOUS at 21:28

## 2024-03-13 RX ADMIN — ENOXAPARIN SODIUM 60 MG: 60 INJECTION SUBCUTANEOUS at 10:07

## 2024-03-13 RX ADMIN — PIPERACILLIN SODIUM AND TAZOBACTAM SODIUM 4.5 G: 36; 4.5 INJECTION, POWDER, LYOPHILIZED, FOR SOLUTION INTRAVENOUS at 06:24

## 2024-03-13 NOTE — PROGRESS NOTES
For questions/concerns on this patient, please reach out to the following:  [facility] G[facility]-GynOnc- Resident/AP  Gyn Oncology Progress note   Antonella Irving 43 y.o. female MRN: [id number]  Unit/Bed#: -01 Encounter: [id number]    Assessment/Plan:    43 y.o. S/p RA-TLH, BSO, lymph node dissection on 2/27 for stage 1 endometrial cancer presented with vaginal bleeding, found to have multiple pelvic collections on CT, developed fever and leukocytosis now being treated with antibiotics.    * Endometrial carcinoma (HCC)  Assessment & Plan  S/p RA-TLH, BSO, lymph node dissection on 2/27  CTAP 3/12: several pelvic collections noted, s/p IR drainage of anterior collection, cultures pending  Temp 100.9 on 3/12, Zosyn started and blood cultures collected  WBC 13 > 19k  Regular diet  Tylenol PRN  Home meds: crestor, prozac, albuterol, HTN meds held           Subjective:    Antonella Irving has no current complaints.  Overnight events: none. Pain is well controlled. She has continued to have light spotting, but has not passed any additional large clots. Patient is currently voiding.  She is ambulating.  Patient is currently passing flatus and has had bowel movement. She is tolerating PO, and denies nausea or vomiting. Patient denies fever, chills, chest pain, shortness of breath, or calf tenderness.     Objective:  /64   Pulse 93   Temp 99.1 °F (37.3 °C)   Resp 18   LMP 02/12/2024   SpO2 92%     No intake/output data recorded.  No intake/output data recorded.    Lab Results   Component Value Date    WBC 19.76 (H) 03/12/2024    HGB 9.2 (L) 03/12/2024    HCT 28.4 (L) 03/12/2024    MCV 87 03/12/2024     (H) 03/12/2024       Lab Results   Component Value Date    CALCIUM 9.0 03/12/2024    K 4.1 03/12/2024    CO2 27 03/12/2024    CL 98 03/12/2024    BUN 19 03/12/2024    CREATININE 0.95 03/12/2024           Physical Exam  Vitals reviewed.   Constitutional:       General: She is not in acute distress.  HENT:       Head: Normocephalic and atraumatic.   Eyes:      Extraocular Movements: Extraocular movements intact.   Cardiovascular:      Rate and Rhythm: Normal rate and regular rhythm.      Pulses: Normal pulses.   Pulmonary:      Effort: Pulmonary effort is normal. No respiratory distress.   Abdominal:      General: There is no distension.      Palpations: Abdomen is soft.      Tenderness: There is no abdominal tenderness.   Musculoskeletal:         General: Normal range of motion.      Cervical back: Normal range of motion.   Skin:     General: Skin is warm and dry.   Neurological:      General: No focal deficit present.      Mental Status: She is alert. Mental status is at baseline.   Psychiatric:         Mood and Affect: Mood normal.         Behavior: Behavior normal.           Gurpreet Springer MD  3/13/2024  7:01 AM  \

## 2024-03-13 NOTE — PLAN OF CARE
Problem: PAIN - ADULT  Goal: Verbalizes/displays adequate comfort level or baseline comfort level  Description: Interventions:  - Encourage patient to monitor pain and request assistance  - Assess pain using appropriate pain scale  - Administer analgesics based on type and severity of pain and evaluate response  - Implement non-pharmacological measures as appropriate and evaluate response  - Consider cultural and social influences on pain and pain management  - Notify physician/advanced practitioner if interventions unsuccessful or patient reports new pain  Outcome: Progressing     Problem: INFECTION - ADULT  Goal: Absence or prevention of progression during hospitalization  Description: INTERVENTIONS:  - Assess and monitor for signs and symptoms of infection  - Monitor lab/diagnostic results  - Monitor all insertion sites, i.e. indwelling lines, tubes, and drains  - Monitor endotracheal if appropriate and nasal secretions for changes in amount and color  - Juda appropriate cooling/warming therapies per order  - Administer medications as ordered  - Instruct and encourage patient and family to use good hand hygiene technique  - Identify and instruct in appropriate isolation precautions for identified infection/condition  Outcome: Progressing  Goal: Absence of fever/infection during neutropenic period  Description: INTERVENTIONS:  - Monitor WBC    Outcome: Progressing     Problem: SAFETY ADULT  Goal: Patient will remain free of falls  Description: INTERVENTIONS:  - Educate patient/family on patient safety including physical limitations  - Instruct patient to call for assistance with activity   - Consult OT/PT to assist with strengthening/mobility   - Keep Call bell within reach  - Keep bed low and locked with side rails adjusted as appropriate  - Keep care items and personal belongings within reach  - Initiate and maintain comfort rounds  - Make Fall Risk Sign visible to staff  - Apply yellow socks and bracelet  for high fall risk patients  - Consider moving patient to room near nurses station  Outcome: Progressing  Goal: Maintain or return to baseline ADL function  Description: INTERVENTIONS:  -  Assess patient's ability to carry out ADLs; assess patient's baseline for ADL function and identify physical deficits which impact ability to perform ADLs (bathing, care of mouth/teeth, toileting, grooming, dressing, etc.)  - Assess/evaluate cause of self-care deficits   - Assess range of motion  - Assess patient's mobility; develop plan if impaired  - Assess patient's need for assistive devices and provide as appropriate  - Encourage maximum independence but intervene and supervise when necessary  - Involve family in performance of ADLs  - Assess for home care needs following discharge   - Consider OT consult to assist with ADL evaluation and planning for discharge  - Provide patient education as appropriate  Outcome: Progressing  Goal: Maintains/Returns to pre admission functional level  Description: INTERVENTIONS:  - Perform AM-PAC 6 Click Basic Mobility/ Daily Activity assessment daily.  - Set and communicate daily mobility goal to care team and patient/family/caregiver.   - Collaborate with rehabilitation services on mobility goals if consulted  - Out of bed for toileting  - Record patient progress and toleration of activity level   Outcome: Progressing     Problem: DISCHARGE PLANNING  Goal: Discharge to home or other facility with appropriate resources  Description: INTERVENTIONS:  - Identify barriers to discharge w/patient and caregiver  - Arrange for needed discharge resources and transportation as appropriate  - Identify discharge learning needs (meds, wound care, etc.)  - Arrange for interpretive services to assist at discharge as needed  - Refer to Case Management Department for coordinating discharge planning if the patient needs post-hospital services based on physician/advanced practitioner order or complex needs  related to functional status, cognitive ability, or social support system  Outcome: Progressing     Problem: Knowledge Deficit  Goal: Patient/family/caregiver demonstrates understanding of disease process, treatment plan, medications, and discharge instructions  Description: Complete learning assessment and assess knowledge base.  Interventions:  - Provide teaching at level of understanding  - Provide teaching via preferred learning methods  Outcome: Progressing     Problem: Prexisting or High Potential for Compromised Skin Integrity  Goal: Skin integrity is maintained or improved  Description: INTERVENTIONS:  - Identify patients at risk for skin breakdown  - Assess and monitor skin integrity  - Assess and monitor nutrition and hydration status  - Monitor labs   - Assess for incontinence   - Turn and reposition patient  - Assist with mobility/ambulation  - Relieve pressure over bony prominences  - Avoid friction and shearing  - Provide appropriate hygiene as needed including keeping skin clean and dry  - Evaluate need for skin moisturizer/barrier cream  - Collaborate with interdisciplinary team   - Patient/family teaching  - Consider wound care consult   Outcome: Progressing

## 2024-03-13 NOTE — UTILIZATION REVIEW
Initial Clinical Review    Admission: Date/Time/Statement:   Admission Orders (From admission, onward)       Ordered        03/12/24 0833  INPATIENT ADMISSION  Once                          Orders Placed This Encounter   Procedures    INPATIENT ADMISSION     Standing Status:   Standing     Number of Occurrences:   1     Order Specific Question:   Level of Care     Answer:   Med Surg [16]     Order Specific Question:   Estimated length of stay     Answer:   More than 2 Midnights     Order Specific Question:   Certification     Answer:   I certify that inpatient services are medically necessary for this patient for a duration of greater than two midnights. See H&P and MD Progress Notes for additional information about the patient's course of treatment.     ED Arrival Information       Expected   -    Arrival   3/12/2024 04:55    Acuity   Emergent              Means of arrival   Walk-In    Escorted by   Family Member    Service   GYN Oncology    Admission type   Emergency              Arrival complaint   post op problem             Chief Complaint   Patient presents with    Post-op Problem     Pt had laparoscopic hysterectomy 2 weeks ago- pt denies any problems after until tonight she woke up around 4am covered in bright red blood and passed a large clot with several small ones. Denies being dizzy/lightheaded currently but c/o but lower abdominal pain       Initial Presentation: 43 y.o. female to ED presents for vaginal bleeding this am. Pt passed a large clot. She has been having bleeding for the last week. Before this morning, she would describe it as a light period. She felt pretty lightheaded this morning after passing the clot but has felt better since arriving here. PMH for s/p RA-TLH, BSO, and SLND on 2/27.    Admit Inpatient level of care for Vaginal bleeding, S/p RA-TLH/BSO on 2/27, 2 wks ago. CTAP with severe pelvic collections noted. Mild leukocytosis, thrombocytosis and elevated INR. Wbc 16k. Hold on  antibiotics. IR consult. NPO. Hgb 10.6. Coags only slightly elevated.     3/12  IR cons; CT abdomen pelvis performed revealing several pelvic abscesses ranging from 5.7 to 7.2 cm in size.   Plan for CT guided aspiration of anterior abscess. NPO. Started on Iv antibiotics and continue.     3/12 S/p IR; Abdominal fluid collection aspiration   Specimens: 16 mL dark bloody fluid aspirate and sent to lab for cultures.   Findings: Successful anterior low abdomen fluid collection aspiration.    Date: 3/13   Day 2:   Progress notes; Single fever 3/12 at noon. Temp 100.9 on 3/12. S/p IR drainage for dark fluid non purulent. Cultures pending to determine antibiotics coverage need. Continue Iv antibiotics. Bld cultures pending. Pain control.       ED Triage Vitals   Temperature Pulse Respirations Blood Pressure SpO2   03/12/24 0522 03/12/24 0503 03/12/24 0503 03/12/24 0503 03/12/24 0503   97.6 °F (36.4 °C) 91 20 104/67 97 %      Temp Source Heart Rate Source Patient Position - Orthostatic VS BP Location FiO2 (%)   03/12/24 0522 03/12/24 0503 03/12/24 0503 03/12/24 0503 --   Tympanic Monitor Lying Left arm       Pain Score       03/12/24 0503       3          Wt Readings from Last 1 Encounters:   02/27/24 (!) 154 kg (340 lb)     Additional Vital Signs:   03/13/24 07:14:39 98.7 °F (37.1 °C) -- -- 112/65 81 -- None (Room air) Lying   03/13/24 01:22:11 99.1 °F (37.3 °C) 93 -- -- -- 92 % -- --   03/12/24 2100 -- -- -- -- -- 94 % None (Room air) --   03/12/24 15:27:57 100.2 °F (37.9 °C) -- -- 109/64 79 -- -- --   03/12/24 15:27:07 -- -- -- 109/64 79 -- -- --   03/12/24 12:13:08 100.9 °F (38.3 °C) Abnormal  99 -- 122/70 87 95 % -- --   03/12/24 09:54:42 98.2 °F (36.8 °C) 89 -- 128/70 89 95 % None (Room air) Lying   03/12/24 0800 -- 84 18 108/53 76 99 % -- --   03/12/24 0726 -- 81 18 98/55 75 98 % -- --   03/12/24 0600 -- 85 18 104/57 72 99 % None (Room air) Lying     Pertinent Labs/Diagnostic Test Results:   IR aspiration only    Final Result by Jameson Luque MD (03/12 1154)      Anterior lower abdomen fluid collection aspiration.      Plan:      Follow-up culture results.      Workstation performed: [id number]         CT abdomen pelvis with contrast   Final Result by Paco Allen MD (03/12 0720)      Several pelvic abscesses ranging in size from 5.0 cm to 7.2 cm.         I personally discussed this study with Dr. Ascencio on 3/12/2024 7:19 AM.               Workstation performed: [id number]               Results from last 7 days   Lab Units 03/13/24  0625 03/12/24  1405 03/12/24  0533   WBC Thousand/uL 17.54* 19.76* 16.21*   HEMOGLOBIN g/dL 9.0* 9.2* 10.6*   HEMATOCRIT % 29.0* 28.4* 33.3*   PLATELETS Thousands/uL 646* 590* 667*   NEUTROS ABS Thousands/µL 13.99* 17.35* 12.93*         Results from last 7 days   Lab Units 03/13/24  0625 03/12/24  0533   SODIUM mmol/L 136 136   POTASSIUM mmol/L 4.0 4.1   CHLORIDE mmol/L 99 98   CO2 mmol/L 27 27   ANION GAP mmol/L 10 11   BUN mg/dL 14 19   CREATININE mg/dL 0.91 0.95   EGFR ml/min/1.73sq m 77 73   CALCIUM mg/dL 8.7 9.0     Results from last 7 days   Lab Units 03/13/24  0625 03/12/24  0533   AST U/L 11* 13   ALT U/L 10 11   ALK PHOS U/L 79 88   TOTAL PROTEIN g/dL 7.1 7.3   ALBUMIN g/dL 3.2* 3.4*   TOTAL BILIRUBIN mg/dL 0.36 0.44         Results from last 7 days   Lab Units 03/13/24  0625 03/12/24  0533   GLUCOSE RANDOM mg/dL 124 140       Results from last 7 days   Lab Units 03/12/24  1405 03/12/24  0533   PROTIME seconds 15.8* 15.4*   INR  1.27* 1.23*   PTT seconds  --  38*       Results from last 7 days   Lab Units 03/12/24  1825 03/12/24  1139   BLOOD CULTURE  Received in Microbiology Lab. Culture in Progress.  --    GRAM STAIN RESULT   --  4+ Polys  No bacteria seen       ED Treatment:   Medication Administration from 03/12/2024 0455 to 03/12/2024 0949         Date/Time Order Dose Route Action     03/12/2024 2639 EDT multi-electrolyte (ISOLYTE-S PH 7.4) bolus 1,000 mL 1,000 mL  Intravenous New Bag     03/12/2024 0533 EDT diazepam (VALIUM) injection 2.5 mg 2.5 mg Intravenous Given     03/12/2024 0621 EDT iohexol (OMNIPAQUE) 350 MG/ML injection (MULTI-DOSE) 100 mL 100 mL Intravenous Given          Past Medical History:   Diagnosis Date    Asthma     Depression     Hyperlipemia     Hypertension     Obesity, morbid, BMI 50 or higher (HCC)     Papanicolaou smear of cervix with positive high risk human papilloma virus (HPV) test     12/2023 pap negative, + HPV type 16;  COLPO:    Sleep apnea      Present on Admission:   Endometrial carcinoma (HCC)      Admitting Diagnosis: Vaginal bleeding [N93.9]  Postprocedural intraabdominal abscess [T81.43XA]  Age/Sex: 43 y.o. female    Admission Orders:  Scheduled Medications:  enoxaparin, 60 mg, Subcutaneous, BID  FLUoxetine, 40 mg, Oral, HS  piperacillin-tazobactam, 4.5 g, Intravenous, Q8H  pravastatin, 40 mg, Oral, Daily With Dinner      Continuous IV Infusions: None     PRN Meds:  acetaminophen, 975 mg, Oral, Q6H PRN  albuterol, 2 puff, Inhalation, Q6H PRN  ondansetron, 4 mg, Intravenous, Q6H PRN        INPATIENT CONSULT TO IR    Network Utilization Review Department  ATTENTION: Please call with any questions or concerns to [phone] and carefully listen to the prompts so that you are directed to the right person. All voicemails are confidential.   For Discharge needs, contact Care Management DC Support Team at [phone] opt. 2  Send all requests for admission clinical reviews, approved or denied determinations and any other requests to dedicated fax number below belonging to the campus where the patient is receiving treatment. List of dedicated fax numbers for the Facilities:  FACILITY NAME UR FAX NUMBER   ADMISSION DENIALS (Administrative/Medical Necessity) [phone]   DISCHARGE SUPPORT TEAM (NETWORK) [phone]   PARENT CHILD HEALTH (Maternity/NICU/Pediatrics) [phone]   [facility] [phone]   [facility]  [facility] [phone]   [facility] [phone]   [facility] [phone]   [facility] [phone]   [facility] [phone]   [facility] [phone]   [facility] [phone]   [facility] [phone]   [facility] [phone]   [facility] [phone]   [facility] [phone]

## 2024-03-13 NOTE — PLAN OF CARE

## 2024-03-14 LAB
ALBUMIN SERPL BCP-MCNC: 3.1 G/DL (ref 3.5–5)
ALP SERPL-CCNC: 72 U/L (ref 34–104)
ALT SERPL W P-5'-P-CCNC: 15 U/L (ref 7–52)
ANION GAP SERPL CALCULATED.3IONS-SCNC: 9 MMOL/L (ref 4–13)
AST SERPL W P-5'-P-CCNC: 19 U/L (ref 13–39)
BASOPHILS # BLD AUTO: 0.05 THOUSANDS/ÂΜL (ref 0–0.1)
BASOPHILS NFR BLD AUTO: 0 % (ref 0–1)
BILIRUB SERPL-MCNC: 0.36 MG/DL (ref 0.2–1)
BUN SERPL-MCNC: 14 MG/DL (ref 5–25)
CALCIUM ALBUM COR SERPL-MCNC: 9.5 MG/DL (ref 8.3–10.1)
CALCIUM SERPL-MCNC: 8.8 MG/DL (ref 8.4–10.2)
CHLORIDE SERPL-SCNC: 100 MMOL/L (ref 96–108)
CO2 SERPL-SCNC: 26 MMOL/L (ref 21–32)
CREAT SERPL-MCNC: 0.78 MG/DL (ref 0.6–1.3)
EOSINOPHIL # BLD AUTO: 0.14 THOUSAND/ÂΜL (ref 0–0.61)
EOSINOPHIL NFR BLD AUTO: 1 % (ref 0–6)
ERYTHROCYTE [DISTWIDTH] IN BLOOD BY AUTOMATED COUNT: 13.1 % (ref 11.6–15.1)
GFR SERPL CREATININE-BSD FRML MDRD: 93 ML/MIN/1.73SQ M
GLUCOSE SERPL-MCNC: 100 MG/DL (ref 65–140)
HCT VFR BLD AUTO: 25.5 % (ref 34.8–46.1)
HGB BLD-MCNC: 8.5 G/DL (ref 11.5–15.4)
IMM GRANULOCYTES # BLD AUTO: 0.11 THOUSAND/UL (ref 0–0.2)
IMM GRANULOCYTES NFR BLD AUTO: 1 % (ref 0–2)
LYMPHOCYTES # BLD AUTO: 2.09 THOUSANDS/ÂΜL (ref 0.6–4.47)
LYMPHOCYTES NFR BLD AUTO: 15 % (ref 14–44)
MCH RBC QN AUTO: 28.1 PG (ref 26.8–34.3)
MCHC RBC AUTO-ENTMCNC: 33.3 G/DL (ref 31.4–37.4)
MCV RBC AUTO: 84 FL (ref 82–98)
MONOCYTES # BLD AUTO: 1.05 THOUSAND/ÂΜL (ref 0.17–1.22)
MONOCYTES NFR BLD AUTO: 8 % (ref 4–12)
NEUTROPHILS # BLD AUTO: 10.37 THOUSANDS/ÂΜL (ref 1.85–7.62)
NEUTS SEG NFR BLD AUTO: 75 % (ref 43–75)
NRBC BLD AUTO-RTO: 0 /100 WBCS
PLATELET # BLD AUTO: 584 THOUSANDS/UL (ref 149–390)
PMV BLD AUTO: 9 FL (ref 8.9–12.7)
POTASSIUM SERPL-SCNC: 4.1 MMOL/L (ref 3.5–5.3)
PROT SERPL-MCNC: 6.8 G/DL (ref 6.4–8.4)
RBC # BLD AUTO: 3.02 MILLION/UL (ref 3.81–5.12)
SODIUM SERPL-SCNC: 135 MMOL/L (ref 135–147)
WBC # BLD AUTO: 13.81 THOUSAND/UL (ref 4.31–10.16)

## 2024-03-14 PROCEDURE — 80053 COMPREHEN METABOLIC PANEL: CPT

## 2024-03-14 PROCEDURE — 99223 1ST HOSP IP/OBS HIGH 75: CPT | Performed by: INTERNAL MEDICINE

## 2024-03-14 PROCEDURE — 99024 POSTOP FOLLOW-UP VISIT: CPT | Performed by: OBSTETRICS & GYNECOLOGY

## 2024-03-14 PROCEDURE — 85025 COMPLETE CBC W/AUTO DIFF WBC: CPT

## 2024-03-14 RX ADMIN — ENOXAPARIN SODIUM 60 MG: 60 INJECTION SUBCUTANEOUS at 09:04

## 2024-03-14 RX ADMIN — FLUOXETINE 40 MG: 20 CAPSULE ORAL at 21:06

## 2024-03-14 RX ADMIN — PRAVASTATIN SODIUM 40 MG: 40 TABLET ORAL at 17:32

## 2024-03-14 RX ADMIN — PIPERACILLIN SODIUM AND TAZOBACTAM SODIUM 4.5 G: 36; 4.5 INJECTION, POWDER, LYOPHILIZED, FOR SOLUTION INTRAVENOUS at 05:12

## 2024-03-14 RX ADMIN — PIPERACILLIN SODIUM AND TAZOBACTAM SODIUM 4.5 G: 36; 4.5 INJECTION, POWDER, LYOPHILIZED, FOR SOLUTION INTRAVENOUS at 13:22

## 2024-03-14 RX ADMIN — ENOXAPARIN SODIUM 60 MG: 60 INJECTION SUBCUTANEOUS at 17:32

## 2024-03-14 NOTE — DISCHARGE SUMMARY
Discharge Summary   Antonella Irving MRN: [id number]  Unit/Bed#: -01 Encounter: [id number]      Admission Date: 3/12/2024     Discharge Date: 03/15/24    Attending: Daria Nuñez MD    Principal Diagnosis: Vaginal bleeding [N93.9]  Postprocedural intraabdominal abscess [T81.43XA]    Secondary Diagnosis:   44 yo Stage 1/grade 1 endometrial cancer now with pelvic fluid 2 week s/p RA-TLH/BSO.     Procedures: IR aspiration of pelvic fluid collection     Hospital course: Patient presented to ED 3/12 for vaginal bleeding 2 week s/p RA-TLH/BSO for endometrial cancer found to have pelvic fluid on CT. Surgery was complicated secondary to adiposity with difficult dissection. Raw edges and collections of old blood over abscess initially suspected. However, she had mild leukocytosis, thrombocytosis and elevated INR. IR performed aspiration and sent 16 mL of dark bloody fluid aspirate to pathology and the procedure had no complications. Antonella developed a fever on hospital day 1 and zosyn was initiated. On hospital day 2, Antonella met SIRS criteria in the setting of postop bleeding/collections with tachycardia, leukocytosis and transient fever. ID was consulted and agreed fever and WBC were likely reactive to postoperative bleeding/collections which do not appear infected. Antibiotics were stopped hospital day 2 and the patient remained afebrile for 24 hours. Both gyn onc and ID agreed patient was stable for discharge home. She was stable for discharge home on hospital day 3.     Lab Results:   Lab Results   Component Value Date    WBC 12.66 (H) 03/15/2024    HGB 7.1 (L) 03/15/2024    HCT 22.0 (L) 03/15/2024    MCV 87 03/15/2024     (H) 03/15/2024     Lab Results   Component Value Date    CALCIUM 8.9 03/15/2024    K 4.3 03/15/2024    CO2 25 03/15/2024     03/15/2024    BUN 14 03/15/2024    CREATININE 0.76 03/15/2024     Lab Results   Component Value Date/Time    POCGLU 114 02/27/2024 04:01 PM     Lab Results    Component Value Date    PTT 38 (H) 03/12/2024     Lab Results   Component Value Date    INR 1.27 (H) 03/12/2024    INR 1.23 (H) 03/12/2024    PROTIME 15.8 (H) 03/12/2024    PROTIME 15.4 (H) 03/12/2024       Complications: none apparent    Condition at discharge: stable     Discharge instructions/Information to patient and family:   See After Visit Summary for information provided to patient and family.      Provisions for Follow-Up Care:  See After Visit Summary for information related to follow-up care and any pertinent home health orders.      Disposition: See After Visit Summary for discharge disposition information.    Planned Readmission: No    Discharge Medications:  For a complete list of the patient's medications, please refer to her med rec.    Jessy Conley MD  3/15/2024  4:22 PM

## 2024-03-14 NOTE — PROGRESS NOTES
For questions/concerns on this patient, please reach out to the following:  [facility] GYN-GynOnc- Resident/AP  Gyn Oncology Progress note   Antonella Irving 43 y.o. female MRN: [id number]  Unit/Bed#: -01 Encounter: [id number]    Assessment/Plan:    43 y.o. S/p RA-TLH, BSO, lymph node dissection on 2/27 for stage 1 endometrial cancer presented with vaginal bleeding, found to have multiple pelvic collections on CT, developed fever and leukocytosis now being treated with antibiotics.    * Endometrial carcinoma (HCC)  Assessment & Plan  S/p RA-TLH, BSO, lymph node dissection on 2/27  CTAP 3/12: several pelvic collections noted, s/p IR drainage of anterior collection, cultures pending  Temp 100.9 on 3/12-->101 F on 3/13.   Zosyn started 3/12 and blood cultures pending  WBC 13 > 19k > 17k > 14k  Regular diet  Tylenol PRN  Home meds: crestor, prozac, albuterol, HTN meds held    Essential hypertension  Assessment & Plan  Home meds held due to hypotension    Recurrent major depressive disorder, in partial remission (HCC)  Assessment & Plan  Home Fluoxetine ordered       Subjective:    Antonella Irving has no current complaints.  Overnight events: Tmax to 101 at 1500 on 3/13. Pain is well controlled. She has had minimal vaginal spotting. She feels well.  Patient is currently voiding.  She is ambulating.  Patient is currently passing flatus and has had bowel movement. She is tolerating PO, and denies nausea or vomiting. Patient denies fever, chills, chest pain, shortness of breath, or calf tenderness.     Objective:  /76   Pulse 85   Temp 99.6 °F (37.6 °C)   Resp 18   LMP 02/12/2024   SpO2 95%     I/O last 3 completed shifts:  In: 1138 [P.O.:1138]  Out: -   No intake/output data recorded.    Lab Results   Component Value Date    WBC 13.81 (H) 03/14/2024    HGB 8.5 (L) 03/14/2024    HCT 25.5 (L) 03/14/2024    MCV 84 03/14/2024     (H) 03/14/2024       Lab Results   Component Value Date    CALCIUM 8.8  03/14/2024    K 4.1 03/14/2024    CO2 26 03/14/2024     03/14/2024    BUN 14 03/14/2024    CREATININE 0.78 03/14/2024           Physical Exam  Vitals reviewed.   Constitutional:       General: She is not in acute distress.  HENT:      Head: Normocephalic and atraumatic.   Eyes:      Extraocular Movements: Extraocular movements intact.   Cardiovascular:      Rate and Rhythm: Normal rate and regular rhythm.      Pulses: Normal pulses.   Pulmonary:      Effort: Pulmonary effort is normal. No respiratory distress.   Abdominal:      General: There is no distension.      Palpations: Abdomen is soft.      Tenderness: There is no abdominal tenderness.      Comments: 5 port sites with small ecchymosis around left two port sites   Musculoskeletal:         General: Normal range of motion.      Cervical back: Normal range of motion.   Skin:     General: Skin is warm and dry.   Neurological:      General: No focal deficit present.      Mental Status: She is alert. Mental status is at baseline.   Psychiatric:         Mood and Affect: Mood normal.         Behavior: Behavior normal.           Nisha Wagoner MD  3/14/2024  6:51 AM

## 2024-03-14 NOTE — CONSULTS
Consultation - Infectious Disease   Antonella Irving 43 y.o. female MRN: [id number]  Unit/Bed#: -01 Encounter: [id number]      IMPRESSION & RECOMMENDATIONS:   Impression/Recommendations:  SIRS versus sepsis, POA.    Fever, WBC.  Suspect all reactive to postoperative bleeding/collections which do not appear infected.  No appreciable source of infection.  ROS and exam otherwise benign.  LFTs, fluid cultures, blood cultures  and CT A/P otherwise negative.  No change with broad spectrum antibiotics which also favors a noninfectious etiology.  Fevers are not subjective.  Leukocytosis may also be partly reactive to mild blood-loss anemia.  Rec:  Discontinue antibiotics and follow closely  Follow temperatures closely  Recheck WBC in AM to monitor infection  Supportive care as per the primary service  If remains stable off antibiotics overnight, anticipate would be stable from ID perspective for D/C tomorrow with close outpatient Gyn/Onc follow-up    Postoperative pelvic fluid collections.    In setting of recent surgery as below.  Status post aspiration of dark bloody fluid.  Cultures negative.  Suspect due to old blood.  Low suspicion for abscess.  Rec:  Discontinue antibiotics as above  Follow drainage closely    Endometrial carcinoma.    Status post RA-TLH/BSO with LN dissection.  Procedure noted to be complicated secondaryt o body habitus and difficult dissection. Complicated by above.    MO.  BMI 56.  Risk factor for postoperative complications as above.    I discussed with Dr. Guo the above plan to stop antibiotics and observe closely overnight.  She agrees with the plan.    Antibiotics:  Zosyn #3    Thank you for this consultation.  We will follow along with you.    HISTORY OF PRESENT ILLNESS:  Reason for Consult: Fever    HPI: Antonella Irving is a 43 y.o. female with recently diagnosed endometrial carcinoma status post RA-TLH/BSO with LN dissection on 2/27.  Procedure noted to be complicated secondary to  body habitus and difficult dissection.  She presented to the ED 3/12 with vaginal bleeding.  Upon presentation noted to be afebrile with tachycardia but later developed fever.  Labs revealed leukocytosis and mild anemia as well as elevated INR.  Exam notable for intact cuff.  CT showed several enhancing fluid collections in the pelvis.  Was started on Zosyn.  Went to IR and had aspiration of one of the collections which yielded dark red blood.  Cultures negative.  She continues to have fever and WBC.  We are asked to comment on further evaluation and management.    In performing this consult, I have reviewed prior admission and outpatient visit records in detail.    REVIEW OF SYSTEMS:  Feels well.  Does not feel fevers.  Some pelvic pressure but no pain.  No dyuria or urinary complaints.  Some scant red-brown drainage from vagina, no bright red blood  A complete system-based review of systems is otherwise negative.    PAST MEDICAL HISTORY:  Past Medical History:   Diagnosis Date    Asthma     Depression     Hyperlipemia     Hypertension     Obesity, morbid, BMI 50 or higher (HCC)     Papanicolaou smear of cervix with positive high risk human papilloma virus (HPV) test     12/2023 pap negative, + HPV type 16;  COLPO:    Sleep apnea      Past Surgical History:   Procedure Laterality Date    HERNIA REPAIR  1999    IR ASPIRATION ONLY  3/12/2024    SC INJECTION PROCEDURE LYMPHANGIOGRAPHY  2/27/2024    Procedure: LYMPHOGRAPHY WITH INDOCYANINE GREEN (ICG);  Surgeon: Daria Nuñez MD;  Location: [facility];  Service: Gynecology Oncology    SC LAPS TOTAL HYSTERECT 250 GM/< W/RMVL TUBE/OVARY N/A 2/27/2024    Procedure: HYSTERECTOMY LAPAROSCOPIC TOTAL (LTH) W/ ROBOTICS, BILATERAL SALPINGO-OOPHORECTOMY, LYMPH NODE DISSECTION;  Surgeon: Daria Nuñez MD;  Location: [facility];  Service: Gynecology Oncology       FAMILY HISTORY:  Non-contributory    SOCIAL HISTORY:  Social History     Substance and Sexual Activity   Alcohol Use  Not Currently     Social History     Substance and Sexual Activity   Drug Use Not Currently    Frequency: 1.0 times per week    Types: Marijuana    Comment: usage in highschool     Social History     Tobacco Use   Smoking Status Former    Current packs/day: 0.00    Types: Cigarettes    Quit date: 2005    Years since quittin.2   Smokeless Tobacco Never   Tobacco Comments    10 per day. No passive smoke exposure       ALLERGIES:  Allergies   Allergen Reactions    Shellfish Allergy - Food Allergy Throat Swelling       MEDICATIONS:  All current active medications have been reviewed, including antibiotics as outlined above.    PHYSICAL EXAM:  Vitals:  Temp:  [98 °F (36.7 °C)-101 °F (38.3 °C)] 98.1 °F (36.7 °C)  HR:  [84-91] 88  Resp:  [18] 18  BP: (105-107)/(64-76) 107/75  SpO2:  [95 %-99 %] 98 %  Temp (24hrs), Av.3 °F (37.4 °C), Min:98 °F (36.7 °C), Max:101 °F (38.3 °C)  Current: Temperature: 98.1 °F (36.7 °C)     Physical Exam:  General:  Well-nourished, well-developed, in no acute distress  Eyes:  Conjunctive clear with no hemorrhages or effusions  Oropharynx:  No ulcers, no lesions  Neck:  Supple, no lymphadenopathy  Lungs:  Normal respiratory excursion, no accessory muscle use  Cardiac:  Regular rate and rhythm, extremities well perfused  Abdomen:  Obese, port sites intact without erythema or drainage  Extremities:  No peripheral cyanosis, clubbing, or edema  Skin:  No rashes, no ulcers  Neurological:  Moves all four extremities spontaneously, sensation grossly intact    LABS, IMAGING, & OTHER STUDIES:  Lab Results:  I have personally reviewed pertinent labs.  Results from last 7 days   Lab Units 24  0510 24  0625 24  0533   SODIUM mmol/L 135 136 136   POTASSIUM mmol/L 4.1 4.0 4.1   CHLORIDE mmol/L 100 99 98   CO2 mmol/L 26 27 27   BUN mg/dL 14 14 19   CREATININE mg/dL 0.78 0.91 0.95   EGFR ml/min/1.73sq m 93 77 73   CALCIUM mg/dL 8.8 8.7 9.0   AST U/L 19 11* 13   ALT U/L 15 10 11    ALK PHOS U/L 72 79 88     Results from last 7 days   Lab Units 03/14/24  0510 03/13/24  0625 03/12/24  1405   WBC Thousand/uL 13.81* 17.54* 19.76*   HEMOGLOBIN g/dL 8.5* 9.0* 9.2*   PLATELETS Thousands/uL 584* 646* 590*     Results from last 7 days   Lab Units 03/13/24  0901 03/12/24  1825 03/12/24  1139   BLOOD CULTURE  No Growth at 24 hrs. No Growth at 24 hrs.  --    GRAM STAIN RESULT   --   --  4+ Polys  No bacteria seen   BODY FLUID CULTURE, STERILE   --   --  No growth       Imaging Studies:   I have personally reviewed the following radiographic images in PACS:  CT A/P reviewed personally several enhancing collections in pelvis.

## 2024-03-14 NOTE — PLAN OF CARE
Problem: INFECTION - ADULT  Goal: Absence or prevention of progression during hospitalization  Description: INTERVENTIONS:  - Assess and monitor for signs and symptoms of infection  - Monitor lab/diagnostic results  - Monitor all insertion sites, i.e. indwelling lines, tubes, and drains  - Monitor endotracheal if appropriate and nasal secretions for changes in amount and color  - Flanagan appropriate cooling/warming therapies per order  - Administer medications as ordered  - Instruct and encourage patient and family to use good hand hygiene technique  - Identify and instruct in appropriate isolation precautions for identified infection/condition  Outcome: Progressing  Goal: Absence of fever/infection during neutropenic period  Description: INTERVENTIONS:  - Monitor WBC    Outcome: Progressing

## 2024-03-15 VITALS
RESPIRATION RATE: 18 BRPM | SYSTOLIC BLOOD PRESSURE: 109 MMHG | OXYGEN SATURATION: 96 % | HEART RATE: 83 BPM | TEMPERATURE: 98 F | DIASTOLIC BLOOD PRESSURE: 63 MMHG

## 2024-03-15 PROBLEM — R65.10 SIRS (SYSTEMIC INFLAMMATORY RESPONSE SYNDROME) (HCC): Status: ACTIVE | Noted: 2024-03-15

## 2024-03-15 PROBLEM — D50.9 IRON DEFICIENCY ANEMIA: Status: ACTIVE | Noted: 2024-03-15

## 2024-03-15 LAB
ALBUMIN SERPL BCP-MCNC: 3.3 G/DL (ref 3.5–5)
ALP SERPL-CCNC: 69 U/L (ref 34–104)
ALT SERPL W P-5'-P-CCNC: 28 U/L (ref 7–52)
ANION GAP SERPL CALCULATED.3IONS-SCNC: 11 MMOL/L (ref 4–13)
AST SERPL W P-5'-P-CCNC: 39 U/L (ref 13–39)
BACTERIA SPEC ANAEROBE CULT: NO GROWTH
BACTERIA SPEC BFLD CULT: NO GROWTH
BASOPHILS # BLD AUTO: 0.04 THOUSANDS/ÂΜL (ref 0–0.1)
BASOPHILS NFR BLD AUTO: 0 % (ref 0–1)
BILIRUB SERPL-MCNC: 0.33 MG/DL (ref 0.2–1)
BUN SERPL-MCNC: 14 MG/DL (ref 5–25)
CALCIUM ALBUM COR SERPL-MCNC: 9.5 MG/DL (ref 8.3–10.1)
CALCIUM SERPL-MCNC: 8.9 MG/DL (ref 8.4–10.2)
CHLORIDE SERPL-SCNC: 102 MMOL/L (ref 96–108)
CO2 SERPL-SCNC: 25 MMOL/L (ref 21–32)
CREAT SERPL-MCNC: 0.76 MG/DL (ref 0.6–1.3)
EOSINOPHIL # BLD AUTO: 0.22 THOUSAND/ÂΜL (ref 0–0.61)
EOSINOPHIL NFR BLD AUTO: 2 % (ref 0–6)
ERYTHROCYTE [DISTWIDTH] IN BLOOD BY AUTOMATED COUNT: 13 % (ref 11.6–15.1)
GFR SERPL CREATININE-BSD FRML MDRD: 96 ML/MIN/1.73SQ M
GLUCOSE SERPL-MCNC: 94 MG/DL (ref 65–140)
GRAM STN SPEC: NORMAL
GRAM STN SPEC: NORMAL
HCT VFR BLD AUTO: 22 % (ref 34.8–46.1)
HGB BLD-MCNC: 7.1 G/DL (ref 11.5–15.4)
IMM GRANULOCYTES # BLD AUTO: 0.14 THOUSAND/UL (ref 0–0.2)
IMM GRANULOCYTES NFR BLD AUTO: 1 % (ref 0–2)
LYMPHOCYTES # BLD AUTO: 2.55 THOUSANDS/ÂΜL (ref 0.6–4.47)
LYMPHOCYTES NFR BLD AUTO: 20 % (ref 14–44)
MCH RBC QN AUTO: 28.2 PG (ref 26.8–34.3)
MCHC RBC AUTO-ENTMCNC: 32.3 G/DL (ref 31.4–37.4)
MCV RBC AUTO: 87 FL (ref 82–98)
MONOCYTES # BLD AUTO: 0.94 THOUSAND/ÂΜL (ref 0.17–1.22)
MONOCYTES NFR BLD AUTO: 7 % (ref 4–12)
NEUTROPHILS # BLD AUTO: 8.77 THOUSANDS/ÂΜL (ref 1.85–7.62)
NEUTS SEG NFR BLD AUTO: 70 % (ref 43–75)
NRBC BLD AUTO-RTO: 0 /100 WBCS
PLATELET # BLD AUTO: 664 THOUSANDS/UL (ref 149–390)
PMV BLD AUTO: 9 FL (ref 8.9–12.7)
POTASSIUM SERPL-SCNC: 4.3 MMOL/L (ref 3.5–5.3)
PROT SERPL-MCNC: 6.6 G/DL (ref 6.4–8.4)
RBC # BLD AUTO: 2.52 MILLION/UL (ref 3.81–5.12)
SODIUM SERPL-SCNC: 138 MMOL/L (ref 135–147)
WBC # BLD AUTO: 12.66 THOUSAND/UL (ref 4.31–10.16)

## 2024-03-15 PROCEDURE — 99024 POSTOP FOLLOW-UP VISIT: CPT | Performed by: OBSTETRICS & GYNECOLOGY

## 2024-03-15 PROCEDURE — 85025 COMPLETE CBC W/AUTO DIFF WBC: CPT

## 2024-03-15 PROCEDURE — NC001 PR NO CHARGE: Performed by: OBSTETRICS & GYNECOLOGY

## 2024-03-15 PROCEDURE — 80053 COMPREHEN METABOLIC PANEL: CPT

## 2024-03-15 PROCEDURE — 99232 SBSQ HOSP IP/OBS MODERATE 35: CPT | Performed by: INTERNAL MEDICINE

## 2024-03-15 RX ADMIN — ACETAMINOPHEN 975 MG: 325 TABLET, FILM COATED ORAL at 11:29

## 2024-03-15 RX ADMIN — ACETAMINOPHEN 975 MG: 325 TABLET, FILM COATED ORAL at 00:05

## 2024-03-15 NOTE — ASSESSMENT & PLAN NOTE
Hgb 10 > 9 > 8 > 7.5 this admission  Patient asymptomatic  No indication for transfusion  Continue to monitor closely

## 2024-03-15 NOTE — ASSESSMENT & PLAN NOTE
Fever and WBC likely secondary to postoperative bleeding and collections without any infection  Per ID recommendations, discontinued antibiotics   Patient last febrile 3/14@1517 to 100.4. two consecutive days with isolated fevers at 1500.  Monitor today off of abx and f/u blood cultures for consideration for discharge tomorrow

## 2024-03-15 NOTE — PLAN OF CARE
Problem: DISCHARGE PLANNING  Goal: Discharge to home or other facility with appropriate resources  Description: INTERVENTIONS:  - Identify barriers to discharge w/patient and caregiver  - Arrange for needed discharge resources and transportation as appropriate  - Identify discharge learning needs (meds, wound care, etc.)  - Arrange for interpretive services to assist at discharge as needed  - Refer to Case Management Department for coordinating discharge planning if the patient needs post-hospital services based on physician/advanced practitioner order or complex needs related to functional status, cognitive ability, or social support system  Outcome: Progressing     Problem: INFECTION - ADULT  Goal: Absence or prevention of progression during hospitalization  Description: INTERVENTIONS:  - Assess and monitor for signs and symptoms of infection  - Monitor lab/diagnostic results  - Monitor all insertion sites, i.e. indwelling lines, tubes, and drains  - Monitor endotracheal if appropriate and nasal secretions for changes in amount and color  - [place] appropriate cooling/warming therapies per order  - Administer medications as ordered  - Instruct and encourage patient and family to use good hand hygiene technique  - Identify and instruct in appropriate isolation precautions for identified infection/condition  Outcome: Progressing  Goal: Absence of fever/infection during neutropenic period  Description: INTERVENTIONS:  - Monitor WBC    Outcome: Progressing

## 2024-03-15 NOTE — CASE MANAGEMENT
Case Management Assessment & Discharge Planning Note    Patient name Antonella Irving  Location /-01 MRN [id number]  : 1980 Date 3/15/2024       Current Admission Date: 3/12/2024  Current Admission Diagnosis:Endometrial carcinoma (HCC)   Patient Active Problem List    Diagnosis Date Noted    SIRS (systemic inflammatory response syndrome) (HCC) 03/15/2024    Iron deficiency anemia 03/15/2024    Endometrial carcinoma (HCC) 2024    Shoulder impingement syndrome, left 2023    Class 3 severe obesity due to excess calories with serious comorbidity and body mass index (BMI) of 50.0 to 59.9 in adult ([facility]) 2023    Mild episode of recurrent major depressive disorder ([facility]) 2023    Right hip pain 2023    Acute pain of right knee 2023    Vitamin D deficiency 2023    Otitis externa 2022    Vitamin D insufficiency 2021    Hyperglycemia 2021    Viral infection, unspecified 2021    Encounter for screening mammogram for malignant neoplasm of breast 2020    Acute laryngitis 2020    Depression, recurrent (HCC) 02/10/2020    Right wrist pain 02/10/2020    Anxiety 02/10/2020    Other hyperlipidemia 02/10/2020    Gastroesophageal reflux disease without esophagitis 2019    Menorrhagia with regular cycle 2019    Mid back pain 2019    Chronic left-sided low back pain without sciatica 2019    Essential hypertension 2019    Recurrent major depressive disorder, in partial remission (HCC) 2019    Immunization due 2019    Multiple environmental allergies 2015    Asthma 2015      LOS (days): 3  Geometric Mean LOS (GMLOS) (days): 2.9  Days to GMLOS:-0.3     OBJECTIVE:    Risk of Unplanned Readmission Score: 9.29         Current admission status: Inpatient       Preferred Pharmacy:   [facility]/pharmacy #[id number] - [place] - [address]  [address]  [place] [address]  Phone: [phone]  Fax: [phone]    [facility] [id number] [facility] [employer] [facility] [address]  [address] [address]  Phone: [phone] Fax: [phone]    Primary Care Provider: Ruddy Taylor MD    Primary Insurance: [facility]  Secondary Insurance:     ASSESSMENT:  Active Health Care Proxies    There are no active Health Care Proxies on file.       Advance Directives  Does patient have a Health Care POA?: No  Was patient offered paperwork?: No (Pt not interested at the time)  Does patient have Advance Directives?: No  Was patient offered paperwork?: No (Pt not interested at the time)         Readmission Root Cause  30 Day Readmission: No    Patient Information  Admitted from:: Home  Mental Status: Alert  During Assessment patient was accompanied by: Not accompanied during assessment  Assessment information provided by:: Patient  Primary Caregiver: Self  Support Systems: Parent (Mother)  County of Residence: [place]  What city do you live in?: [place]  Home entry access options. Select all that apply.: Stairs  Do the steps have railings?: Yes  Type of Current Residence: [address] [place] home  Upon entering residence, is there a bedroom on the main floor (no further steps)?: No  A bedroom is located on the following floor levels of residence (select all that apply):: 2nd Floor  Upon entering residence, is there a bathroom on the main floor (no further steps)?: No  Indicate which floors of current residence have a bathroom (select all the apply):: 2nd Floor  Number of steps to 2nd floor from main floor: One Flight  Living Arrangements: Lives w/ Extended Family, Lives w/ Parent(s)  Is patient a ?: No    Activities of Daily Living Prior to Admission  Functional Status: Independent  Completes ADLs independently?: Yes  Ambulates independently?: Yes  Does patient use assisted devices?: No  Does patient currently own DME?: No  Does patient have a history of Outpatient Therapy (PT/OT)?: No  Does  the patient have a history of Short-Term Rehab?: No  Does patient have a history of HHC?: No  Does patient currently have HHC?: No         Patient Information Continued  Income Source: Employed  Does patient have prescription coverage?: Yes  Does patient receive dialysis treatments?: No  Does patient have a history of substance abuse?: No  Does patient have a history of Mental Health Diagnosis?: Yes  Is patient receiving treatment for mental health?: Yes (Taking medication)  Has patient received inpatient treatment related to mental health in the last 2 years?: No         Means of Transportation  [facility]      Social Determinants of Health (SDOH)      Flowsheet Row Most Recent Value   Housing Stability    In the last 12 months, was there a time when you were not able to pay the mortgage or rent on time? N   In the last 12 months, how many places have you lived? 1   In the last 12 months, was there a time when you did not have a steady place to sleep or slept in a shelter (including now)? N   Transportation Needs    In the past 12 months, has lack of transportation kept you from medical appointments or from getting medications? no   In the past 12 months, has lack of transportation kept you from meetings, work, or from getting things needed for daily living? No   Food Insecurity    Within the past 12 months, you worried that your food would run out before you got the money to buy more. Never true   Within the past 12 months, the food you bought just didn't last and you didn't have money to get more. Never true   Utilities    In the past 12 months has the electric, gas, oil, or water company threatened to shut off services in your home? No            DISCHARGE DETAILS:    Discharge planning discussed with:: Patient  Freedom of Choice: Yes  Comments - Freedom of Choice: Pt will be discharged home. No service needed.  CM contacted family/caregiver?: No- see comments (Patient doesn't  want CM to contact family.)                  Requested Home Health Care         Is the patient interested in HHC at discharge?: No                   Treatment Team Recommendation: Home  Discharge Destination Plan:: Home  Transport at Discharge : Family           ETA of Transport (Date): 03/15/24           Accompanied by: Family member

## 2024-03-15 NOTE — PROGRESS NOTES
For questions/concerns on this patient, please reach out to the following:  [facility] GYN-GynOnc- Resident/AP  Gyn Oncology Progress note   Antonella Irving 43 y.o. female MRN: [id number]  Unit/Bed#: -01 Encounter: [id number]    Assessment/Plan:    43 y.o. S/p RA-TLH, BSO, lymph node dissection on 2/27 for stage 1 endometrial cancer presented with vaginal bleeding, found to have multiple pelvic collections on CT, developed fever and leukocytosis. Antibiotics discontinued yesterday. ID is following. Will monitor today off antibiotics today for consideration of discharge tomorrow. Leukocytosis is slowly trending down.     SIRS (systemic inflammatory response syndrome) ([facility])  Assessment & Plan  Fever and WBC likely secondary to postoperative bleeding and collections without any infection  Per ID recommendations, discontinued antibiotics   Patient last febrile 3/14@1517 to 100.4. two consecutive days with isolated fevers at 1500.  Monitor today off of abx and f/u blood cultures for consideration for discharge tomorrow    Recurrent major depressive disorder, in partial remission ([facility])  Assessment & Plan  Home Fluoxetine ordered    Essential hypertension  Assessment & Plan  Home meds held due to hypotension    * Endometrial carcinoma ([facility])  Assessment & Plan  S/p RA-TLH, BSO, lymph node dissection on 2/27  CTAP 3/12: several pelvic collections noted, s/p IR drainage of anterior collection, cultures pending  Temp 100.9 on 3/12-->101 F on 3/13.   Zosyn started 3/12 and blood cultures NG@24 hrs  WBC 13 > 19k > 17k > 14k > 13k  Regular diet  Tylenol PRN  Home meds: crestor, prozac, albuterol, HTN meds held       Subjective:    Antonella Irving has no current complaints.  Overnight events: Tmax to 100.4 at 1517 on 3/14. Pain is well controlled. She has had minimal vaginal spotting. She feels well.  Patient is currently voiding.  She is ambulating.  Patient is currently passing flatus and has had bowel movement. She is  tolerating PO, and denies nausea or vomiting. Patient denies fever, chills, chest pain, shortness of breath, or calf tenderness.     Objective:  /72 (BP Location: Right arm)   Pulse 86   Temp 99.4 °F (37.4 °C) (Oral)   Resp 17   LMP 02/12/2024   SpO2 95%     I/O last 3 completed shifts:  In: 1318 [P.O.:1318]  Out: -   No intake/output data recorded.    Lab Results   Component Value Date    WBC 12.66 (H) 03/15/2024    HGB 7.1 (L) 03/15/2024    HCT 22.0 (L) 03/15/2024    MCV 87 03/15/2024     (H) 03/15/2024       Lab Results   Component Value Date    CALCIUM 8.9 03/15/2024    K 4.3 03/15/2024    CO2 25 03/15/2024     03/15/2024    BUN 14 03/15/2024    CREATININE 0.76 03/15/2024           Physical Exam  Vitals reviewed.   Constitutional:       General: She is not in acute distress.  HENT:      Head: Normocephalic and atraumatic.   Eyes:      Extraocular Movements: Extraocular movements intact.   Cardiovascular:      Rate and Rhythm: Normal rate and regular rhythm.      Pulses: Normal pulses.   Pulmonary:      Effort: Pulmonary effort is normal. No respiratory distress.   Abdominal:      General: There is no distension.      Palpations: Abdomen is soft.      Tenderness: There is no abdominal tenderness.      Comments: 5 port sites with small ecchymosis around left two port sites   Musculoskeletal:         General: Normal range of motion.      Cervical back: Normal range of motion.   Skin:     General: Skin is warm and dry.   Neurological:      General: No focal deficit present.      Mental Status: She is alert. Mental status is at baseline.   Psychiatric:         Mood and Affect: Mood normal.         Behavior: Behavior normal.           Jessy Conley MD  3/15/2024  6:34 AM

## 2024-03-15 NOTE — PROGRESS NOTES
Progress Note - Infectious Disease   Antonella Irving 43 y.o. female MRN: [id number]  Unit/Bed#: -01 Encounter: [id number]      Impression/Recommendations:  SIRS, POA.    Fever, WBC.  Suspect all reactive to postoperative bleeding/collections which do not appear infected.  No appreciable source of infection.  ROS and exam otherwise benign.  LFTs, fluid cultures, blood cultures  and CT A/P otherwise negative.  No change with broad spectrum antibiotics which also favors a noninfectious etiology.  Fevers are not subjective.  Leukocytosis may also be partly reactive to mild blood-loss anemia.  Overall improving.  Rec:  Continue to follow closely off antibiotics  Follow temperatures closely  Recheck WBC next week  Supportive care as per the primary service     Postoperative pelvic fluid collections.    In setting of recent surgery as below.  Status post aspiration of dark bloody fluid.  Cultures negative.  Suspect due to old blood.  Low clinical suspicion for abscess.  Rec:  Discontinue antibiotics as above  Follow drainage closely     Endometrial carcinoma.    Status post RA-TLH/BSO with LN dissection.  Procedure noted to be complicated secondaryt o body habitus and difficult dissection. Complicated by above.     MO.  BMI 56.  Risk factor for postoperative complications as above.     The patient is stable from an ID standpoint for D/C home.  I discussed with Dr. Conley the above plan to consider D/C home later today.  She agrees with the plan.     Antibiotics:  Off antibiotics #1    Subjective/24 Hour Events:  Patient seen on AM rounds.  Feels great.  Just took a shower, walked in forbes, wants to go home.    Objective:  Vitals:  Temp:  [98.2 °F (36.8 °C)-100.4 °F (38 °C)] 98.2 °F (36.8 °C)  HR:  [82-86] 82  Resp:  [17-18] 18  BP: (100-111)/(44-73) 100/44  SpO2:  [95 %-96 %] 95 %  Temp (24hrs), Av.3 °F (37.4 °C), Min:98.2 °F (36.8 °C), Max:100.4 °F (38 °C)  Current: Temperature: 98.2 °F (36.8 °C)    Physical Exam:    General:  No acute distress  Psychiatric:  Awake and alert  Pulmonary:  Normal respiratory excursion without accessory muscle use  Abdomen:  Soft, nontender  Extremities:  No edema  Skin:  No rashes    Lab Results:  I have personally reviewed pertinent labs.  Results from last 7 days   Lab Units 03/15/24  0456 03/14/24  0510 03/13/24  0625   SODIUM mmol/L 138 135 136   POTASSIUM mmol/L 4.3 4.1 4.0   CHLORIDE mmol/L 102 100 99   CO2 mmol/L 25 26 27   BUN mg/dL 14 14 14   CREATININE mg/dL 0.76 0.78 0.91   EGFR ml/min/1.73sq m 96 93 77   CALCIUM mg/dL 8.9 8.8 8.7   AST U/L 39 19 11*   ALT U/L 28 15 10   ALK PHOS U/L 69 72 79     Results from last 7 days   Lab Units 03/15/24  0456 03/14/24  0510 03/13/24  0625   WBC Thousand/uL 12.66* 13.81* 17.54*   HEMOGLOBIN g/dL 7.1* 8.5* 9.0*   PLATELETS Thousands/uL 664* 584* 646*     Results from last 7 days   Lab Units 03/13/24  0901 03/12/24  1825 03/12/24  1139   BLOOD CULTURE  No Growth at 48 hrs. No Growth at 48 hrs.  --    GRAM STAIN RESULT   --   --  4+ Polys  No bacteria seen   BODY FLUID CULTURE, STERILE   --   --  No growth       Imaging Studies:   I have personally reviewed pertinent imaging study reports and images in PACS.    EKG, Pathology, and Other Studies:   I have personally reviewed pertinent reports.

## 2024-03-17 LAB
BACTERIA BLD CULT: NORMAL
BACTERIA BLD CULT: NORMAL

## 2024-03-18 ENCOUNTER — PATIENT OUTREACH (OUTPATIENT)
Dept: CASE MANAGEMENT | Facility: HOSPITAL | Age: 44
End: 2024-03-18

## 2024-03-18 ENCOUNTER — TELEPHONE (OUTPATIENT)
Dept: GYNECOLOGIC ONCOLOGY | Facility: CLINIC | Age: 44
End: 2024-03-18

## 2024-03-18 ENCOUNTER — TELEPHONE (OUTPATIENT)
Dept: HEMATOLOGY ONCOLOGY | Facility: CLINIC | Age: 44
End: 2024-03-18

## 2024-03-18 ENCOUNTER — TRANSITIONAL CARE MANAGEMENT (OUTPATIENT)
Dept: FAMILY MEDICINE CLINIC | Facility: CLINIC | Age: 44
End: 2024-03-18

## 2024-03-18 LAB
BACTERIA BLD CULT: NORMAL
FUNGUS SPEC CULT: NORMAL

## 2024-03-18 NOTE — TELEPHONE ENCOUNTER
Patient Call    Who are you speaking with? Patient    If it is not the patient, are they listed on an active communication consent form? N/A   What is the reason for this call? Patient calling in regards to her follow up care with Dr Nuñez.  Patient had surgery with Dr Nuñez on 2/27/24.  Patient had a post op appointment scheduled for 3/12/24 but patient was in the ED due to symptoms she was experiencing.      Patient would like a call back to discuss her care, post op appointment and possibly hormone therapy.    Patient states that she is still experiencing some light bleeding but is feeling good from her surgery.    Does this require a call back? Yes   If a call back is required, please list best call back number [phone]   If a call back is required, advise that a message will be forwarded to their care team and someone will return their call as soon as possible.   Did you relay this information to the patient? Yes

## 2024-03-18 NOTE — TELEPHONE ENCOUNTER
----- Message from Smita Riojas PA-C sent at 3/12/2024  2:17 PM EDT -----  Regarding: F/U drain cultures  Patient seen in ED on 3/12 with new pelvic fluid collections (hematoma vs abscess).   IR placed drain, with old blood. Patient was not sent home on abx.   Can you f/u on cultures?  Thanks!

## 2024-03-18 NOTE — PROGRESS NOTES
Biopsychosocial and Barriers Assessment    Cancer Diagnosis: endometrial   Home/Cell Phone: [phone]  Emergency Contact: Sean Rodriguez  Marital Status: single   Interpretation concerns, speaks another language, preferred language: English  Cultural concerns: none  Ability to read or write: yes    Caregiver/Support: mom and supportive family and friends  Children: no  Child/Elder care: no    Housing: house  Home Setup: 3 floors, no difficulty navigating   Lives With:  mom, brother, sister, sister's fiance and sister's 3 children  Daily Living Activities: independent   Durable Medical Equipment:no  Ambulation:  independent     [facility]erred [facility]acy[facility]  High co-pays with insurance: no   High co-pays with medication coverage: no  No medication coverage:  no    Primary Care Provider: Dr. Taylor   Hx of Home Health Care: no   Hx of Short term rehab: no  Mental Health Hx: depression which is managed by PCP and is stable per patient   Substance Abuse Hx: no  Employment: works full time and work has been supportive   Status/Location: no  Ability to pay bills: yes  POA/LW/AD: does not have, was agreeable to [facility] mailing out information  Transportation Plan/Concerns: no concerns       What do you know about your Cancer Diagnosis    What has your doctor told you about your cancer diagnosis: endometrial     What has your doctor told you about your cancer treatment: will have follow up with Dr. Nuñez on 3/25/24    What specific concerns do you have about your diagnosis and treatment: waiting to speak with Dr. Nuñez about hormone replacement therapy     Have you been made aware of any hair loss associated with treatment: did not discuss    Additional Comments:  [facility] outreached to complete assessment and DT. Patient self rated DT as 2/10. Patient does report some pain but is managed with tylenol. Patient does report difficulty with sleep because she has to urinate about every 2 hours. Patient reported that she doesn't have  much of an appetite. Patient does report some worry but manageable. Patient does worry about feeling like a burden, was experiencing loneliness while she was inpatient. Patient is curious what her insurance will cover and was encouraged to reach out to [facility] should she receive medical bills she can't afford so referral can be placed for [facility]. Patient denies additional needs at this time. ACP documentation to be mailed. [facility] will close however should patient need assistance, will be available.

## 2024-03-18 NOTE — UTILIZATION REVIEW
NOTIFICATION OF ADMISSION DISCHARGE   This is a Notification of Discharge from [facility]. Please be advised that this patient has been discharge from our facility. Below you will find the admission and discharge date and time including the patient’s disposition.   UTILIZATION REVIEW CONTACT:  Broderick Sow  Utilization   Network Utilization Review Department  Phone: [phone] x carefully listen to the prompts. All voicemails are confidential.  Email: [email or web address]     ADMISSION INFORMATION  PRESENTATION DATE: 3/12/2024  4:56 AM  OBERVATION ADMISSION DATE:   INPATIENT ADMISSION DATE: 3/12/24  8:33 AM   DISCHARGE DATE: 3/15/2024  6:03 PM   DISPOSITION:Home/Self Care    Network Utilization Review Department  ATTENTION: Please call with any questions or concerns to [phone] and carefully listen to the prompts so that you are directed to the right person. All voicemails are confidential.   For Discharge needs, contact Care Management DC Support Team at [phone] opt. 2  Send all requests for admission clinical reviews, approved or denied determinations and any other requests to dedicated fax number below belonging to the campus where the patient is receiving treatment. List of dedicated fax numbers for the Facilities:  FACILITY NAME UR FAX NUMBER   ADMISSION DENIALS (Administrative/Medical Necessity) [phone]   DISCHARGE SUPPORT TEAM ([facility]) [phone]   PARENT CHILD HEALTH (Maternity/NICU/Pediatrics) [phone]   [facility] [phone]   [facility] [phone]   [facility] [phone]   [facility] [phone]   [facility] [phone]   [facility] [phone]   [facility] [phone]   [facility] [phone]   [facility]  [facility] [phone]   [facility] [phone]   [facility] [phone]   [facility] [phone]

## 2024-03-18 NOTE — TELEPHONE ENCOUNTER
Return call placed.  Appointment with Dr Nuñez for Monday March 25 @ 4pm for f/u after hospitalization.  States she is doing well except for feeling emotional and a bit on edge (post menopausal symptoms).  She will discuss at her visit with Dr Nuñez.

## 2024-03-19 ENCOUNTER — DOCUMENTATION (OUTPATIENT)
Dept: GYNECOLOGIC ONCOLOGY | Facility: CLINIC | Age: 44
End: 2024-03-19

## 2024-03-19 DIAGNOSIS — E78.49 OTHER HYPERLIPIDEMIA: ICD-10-CM

## 2024-03-19 RX ORDER — ROSUVASTATIN CALCIUM 5 MG/1
5 TABLET, COATED ORAL DAILY
Qty: 90 TABLET | Refills: 1 | Status: SHIPPED | OUTPATIENT
Start: 2024-03-19

## 2024-03-19 NOTE — PROGRESS NOTES
Multidisciplinary Gynecologic Oncology Tumor Case Review       Physician Recommended Plan     Antonella Irving is a 44 y.o. 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 The group recommended to consider routine observation.     Follow-up appointment with Dr Nuñez on 3/25/2024.     NCCN guidelines were available for review.     The final treatment plan will be left to the discretion of the patient and the treating physician.       DISCLAIMERS:    TO THE TREATING PHYSICIAN:  This conference is a meeting of clinicians from various specialty areas who evaluate and discuss patients for whom a multidisciplinary treatment approach is being considered. Please note that the above opinion was a consensus of the conference attendees and is intended only to assist in quality care of the discussed patient.  The responsibility for follow up on the input given during the conference, along with any final decisions regarding plan of care, is that of the patient and the patient's provider. Accordingly, appointments have only been recommended based on this information and have NOT been scheduled unless otherwise noted.      TO THE PATIENT:  This summary is a brief record of major aspects of your cancer treatment. You may choose to share a copy with any of your doctors or nurses. However, this is not a detailed or comprehensive record of your care.

## 2024-03-20 ENCOUNTER — OFFICE VISIT (OUTPATIENT)
Dept: FAMILY MEDICINE CLINIC | Facility: CLINIC | Age: 44
End: 2024-03-20
Payer: COMMERCIAL

## 2024-03-20 VITALS
WEIGHT: 293 LBS | SYSTOLIC BLOOD PRESSURE: 128 MMHG | HEIGHT: 65 IN | RESPIRATION RATE: 16 BRPM | TEMPERATURE: 97.9 F | HEART RATE: 81 BPM | BODY MASS INDEX: 48.82 KG/M2 | DIASTOLIC BLOOD PRESSURE: 80 MMHG | OXYGEN SATURATION: 98 %

## 2024-03-20 DIAGNOSIS — E66.01 CLASS 3 SEVERE OBESITY DUE TO EXCESS CALORIES WITH SERIOUS COMORBIDITY AND BODY MASS INDEX (BMI) OF 50.0 TO 59.9 IN ADULT (HCC): ICD-10-CM

## 2024-03-20 DIAGNOSIS — F33.9 DEPRESSION, RECURRENT (HCC): ICD-10-CM

## 2024-03-20 DIAGNOSIS — E78.49 OTHER HYPERLIPIDEMIA: ICD-10-CM

## 2024-03-20 DIAGNOSIS — R73.9 HYPERGLYCEMIA: ICD-10-CM

## 2024-03-20 DIAGNOSIS — I10 ESSENTIAL HYPERTENSION: ICD-10-CM

## 2024-03-20 DIAGNOSIS — C54.1 ENDOMETRIAL CARCINOMA (HCC): Primary | ICD-10-CM

## 2024-03-20 PROCEDURE — 99495 TRANSJ CARE MGMT MOD F2F 14D: CPT | Performed by: FAMILY MEDICINE

## 2024-03-20 NOTE — ASSESSMENT & PLAN NOTE
Controlled: Continue taking Prozac 40 mg capsules as directed. Call if symptoms are worsening. Follow-up in 3 months

## 2024-03-20 NOTE — ASSESSMENT & PLAN NOTE
Continue to hold zepbound as she is 3 weeks s/p hysterectomy and is adjusting to menopausal symptoms. We will reconsider zepbound initiation in 3 months but for now continue with healthy lifestyle, diet, and exercise.

## 2024-03-20 NOTE — ASSESSMENT & PLAN NOTE
S/p total hysterectomy following with gyn/onc for post surgical management- next appointment scheduled for Monday

## 2024-03-20 NOTE — ASSESSMENT & PLAN NOTE
Controlled: Continue taking Crestor 5 mg daily as directed. Follow-up in 3 months with fasting lipid panel.

## 2024-03-20 NOTE — PROGRESS NOTES
Name: Antonella Irving      : 1980      MRN: [id number]  Encounter Provider: Ruddy Taylor MD  Encounter Date: 3/20/2024   Encounter department: [facility]    Assessment & Plan     1. Endometrial carcinoma (HCC)  Assessment & Plan:  S/p total hysterectomy following with gyn/onc for post surgical management- next appointment scheduled for Monday       2. Essential hypertension  Assessment & Plan:  Controlled: continue taking lisinopril-HCTZ once daily as directed.     Orders:  -     CBC and differential; Future  -     Comprehensive metabolic panel; Future  -     Lipid Panel with Direct LDL reflex; Future  -     TSH, 3rd generation with Free T4 reflex; Future    3. Depression, recurrent (HCC)  Assessment & Plan:  Controlled: Continue taking Prozac 40 mg capsules as directed. Call if symptoms are worsening. Follow-up in 3 months       4. Class 3 severe obesity due to excess calories with serious comorbidity and body mass index (BMI) of 50.0 to 59.9 in adult (HCC)  Assessment & Plan:  Continue to hold zepbound as she is 3 weeks s/p hysterectomy and is adjusting to menopausal symptoms. We will reconsider zepbound initiation in 3 months but for now continue with healthy lifestyle, diet, and exercise.       5. Other hyperlipidemia  Assessment & Plan:  Controlled: Continue taking Crestor 5 mg daily as directed. Follow-up in 3 months with fasting lipid panel.    Orders:  -     CBC and differential; Future  -     Comprehensive metabolic panel; Future  -     Lipid Panel with Direct LDL reflex; Future  -     TSH, 3rd generation with Free T4 reflex; Future    6. Hyperglycemia  Assessment & Plan:  Continue with low carb diet and exercise. Follow-up in 3 months with CMP and A1C    Orders:  -     Hemoglobin A1C; Future      TCM Call     Date and time call was made  3/18/2024  8:34 AM    Hospital care reviewed  Records reviewed    Patient was hospitial[facility] at  [facility]    Date of  Admission  03/12/24    Date of discharge  03/15/24    Diagnosis  Vaginal bleeding [N93.9] Postprocedural intraabdominal abscess    Disposition  Home    Were the patients medications reviewed and updated  No      TCM Call     Post hospital issues  None    Should patient be enrolled in anticoag monitoring?  No    Scheduled for follow up?  Yes    Did you obtain your prescribed medications  Yes    Do you need help managing your prescriptions or medications  No    Is transportation to your appointment needed  Yes    Specify why  pt has surgery and cannot drive right now    I have advised the patient to call PCP with any new or worsening symptoms  Morenita Morton RN    Are you recieving any outpatient services  No    Are you recieving home care services  No    Are you using any community resources  No    Current waiver services  No    Have you fallen in the last 12 months  No    Interperter language line needed  No               Subjective     Antonella is a 43 y/o female with PMH significant for endometrial carcinoma s/p hysterectomy, obesity, depression, HTN, and HLD presenting to the office for transitional care management. She is 3 weeks s/p total hysterectomy and was readmitted on 3/12/24 for intraabdominal abscess, fever, and vaginal bleeding. She was treated and discharged on hospital day 3, after 24 hours afebrile off of antibiotics. She has had no complications since. She is still having abdominal discomfort and mild vaginal bleeding since surgery, filling 1- 2 pads daily but she denies and fever, chills, or severe pain. She is experiencing mood swings, urinary frequency, cold intolerance, and hot flashes that are primarily worse at night since her hysterectomy. She is taking all of her medications as prescribed and without difficulty but will continue to hold the zepbound since her recent surgery and reevaluate in 3 months. She plans to follow-up with gyn-onc on 3/25/24.       Review of Systems   Constitutional:   Negative for chills, fatigue and fever.   HENT:  Negative for ear pain and sore throat.    Eyes:  Negative for pain and visual disturbance.   Respiratory:  Negative for cough and shortness of breath.    Cardiovascular:  Negative for chest pain and palpitations.   Gastrointestinal:  Positive for abdominal pain. Negative for abdominal distention, constipation, diarrhea and vomiting.   Endocrine: Positive for cold intolerance and heat intolerance.   Genitourinary:  Positive for frequency, pelvic pain and vaginal bleeding. Negative for dysuria.   Musculoskeletal:  Negative for arthralgias and back pain.   Skin:  Negative for color change and rash.   Neurological:  Negative for seizures and syncope.   Psychiatric/Behavioral:  Positive for dysphoric mood and sleep disturbance. Negative for decreased concentration, self-injury and suicidal ideas. The patient is not nervous/anxious.    All other systems reviewed and are negative.      Past Medical History:   Diagnosis Date   • Asthma    • Depression    • Hyperlipemia    • Hypertension    • Obesity, morbid, BMI 50 or higher (HCC)    • Papanicolaou smear of cervix with positive high risk human papilloma virus (HPV) test     12/2023 pap negative, + HPV type 16;  COLPO:   • Sleep apnea      Past Surgical History:   Procedure Laterality Date   • HERNIA REPAIR  1999   • IR ASPIRATION ONLY  3/12/2024   • TN INJECTION PROCEDURE LYMPHANGIOGRAPHY  2/27/2024    Procedure: LYMPHOGRAPHY WITH INDOCYANINE GREEN (ICG);  Surgeon: Daria Nuñez MD;  Location: [facility];  Service: Gynecology Oncology   • TN LAPS TOTAL HYSTERECT 250 GM/< W/RMVL TUBE/OVARY N/A 2/27/2024    Procedure: HYSTERECTOMY LAPAROSCOPIC TOTAL (LTH) W/ ROBOTICS, BILATERAL SALPINGO-OOPHORECTOMY, LYMPH NODE DISSECTION;  Surgeon: Daria Nuñez MD;  Location: [facility];  Service: Gynecology Oncology     Family History   Problem Relation Age of Onset   • Hypertension Mother    • Diabetes Mother         Mellitus   • Pancreatic  cancer Father    • Diabetes Father         Mellitus   • Coronary artery disease Father    • Cancer Father         Pancreatic   • No Known Problems Sister    • No Known Problems Sister    • No Known Problems Sister    • No Known Problems Sister    • No Known Problems Brother    • No Known Problems Brother    • No Known Problems Brother    • No Known Problems Maternal Grandmother    • No Known Problems Maternal Grandfather    • Breast cancer Paternal Grandmother    • Cancer Paternal Grandmother         Breast   • No Known Problems Paternal Grandfather    • Cervical cancer Maternal Aunt    • Cancer Maternal Aunt         cervical   • Breast cancer Paternal Aunt 64   • Diabetes Family         Mellitus   • Colon cancer Neg Hx    • Ovarian cancer Neg Hx    • Uterine cancer Neg Hx      Social History     Socioeconomic History   • Marital status: Single     Spouse name: None   • Number of children: None   • Years of education: None   • Highest education level: None   Occupational History   • None   Tobacco Use   • Smoking status: Former     Current packs/day: 0.00     Types: Cigarettes     Quit date: 2005     Years since quittin.2   • Smokeless tobacco: Never   • Tobacco comments:     10 per day. No passive smoke exposure   Vaping Use   • Vaping status: Never Used   Substance and Sexual Activity   • Alcohol use: Not Currently   • Drug use: Not Currently     Frequency: 1.0 times per week     Types: Marijuana     Comment: usage in highschool   • Sexual activity: Not Currently     Partners: Male     Birth control/protection: Condom Male     Comment: last SA one year ago   Other Topics Concern   • None   Social History Narrative   • None     Social Determinants of Health     Financial Resource Strain: Not on file   Food Insecurity: No Food Insecurity (3/15/2024)    Hunger Vital Sign    • Worried About Running Out of Food in the Last Year: Never true    • Ran Out of Food in the Last Year: Never true   Transportation  "Needs: No Transportation Needs (3/15/2024)    PRAPARE - Transportation    • Lack of Transportation (Medical): No    • Lack of Transportation (Non-Medical): No   Physical Activity: Not on file   Stress: Not on file   Social Connections: Not on file   Intimate Partner Violence: Not on file   Housing Stability: Low Risk  (3/15/2024)    Housing Stability Vital Sign    • Unable to Pay for Housing in the Last Year: No    • Number of Places Lived in the Last Year: 1    • Unstable Housing in the Last Year: No     Current Outpatient Medications on File Prior to Visit   Medication Sig   • albuterol (Ventolin HFA) 90 mcg/act inhaler Inhale 2 puffs every 6 (six) hours as needed for wheezing   • ergocalciferol (VITAMIN D2) 50,000 units TAKE 1 CAPSULE BY MOUTH ONE TIME PER WEEK   • FLUoxetine (PROzac) 40 MG capsule TAKE 1 CAPSULE BY MOUTH EVERY DAY   • lisinopril-hydrochlorothiazide (PRINZIDE,ZESTORETIC) 20-25 MG per tablet TAKE 1 TABLET BY MOUTH EVERY DAY   • rosuvastatin (CRESTOR) 5 mg tablet TAKE 1 TABLET (5 MG TOTAL) BY MOUTH DAILY.   • meloxicam (MOBIC) 15 mg tablet Take 1 tablet (15 mg total) by mouth daily as needed for moderate pain (Patient not taking: Reported on 1/29/2024)   • Zepbound 2.5 MG/0.5ML auto-injector Inject 2.5 mg under the skin once a week (Patient not taking: Reported on 1/29/2024)     Allergies   Allergen Reactions   • Shellfish Allergy - Food Allergy Throat Swelling     Immunization History   Administered Date(s) Administered   • COVID-19 PFIZER VACCINE 0.3 ML IM 08/02/2021, 08/23/2021, 02/22/2022   • COVID-19 Pfizer vac (Riley-sucrose, gray cap) 12 yr+ IM 02/22/2022   • Fluzone Split Quad 0.25 mL 10/05/2017   • INFLUENZA 10/05/2017       Objective     /80 (BP Location: Left arm, Patient Position: Sitting, Cuff Size: Large)   Pulse 81   Temp 97.9 °F (36.6 °C) (Tympanic)   Resp 16   Ht 5' 5\" (1.651 m)   Wt (!) 151 kg (333 lb)   LMP 02/12/2024   SpO2 98%   BMI 55.41 kg/m²     Physical " Exam  Constitutional:       General: She is not in acute distress.     Appearance: She is not ill-appearing or toxic-appearing.   Cardiovascular:      Rate and Rhythm: Normal rate and regular rhythm.      Heart sounds: Normal heart sounds. No murmur heard.     No friction rub. No gallop.   Pulmonary:      Effort: Pulmonary effort is normal. No respiratory distress.      Breath sounds: No wheezing, rhonchi or rales.   Abdominal:      General: There is no distension.      Palpations: Abdomen is soft.      Tenderness: There is no abdominal tenderness. There is no guarding.   Musculoskeletal:      Right lower leg: No edema.      Left lower leg: No edema.   Neurological:      General: No focal deficit present.      Mental Status: She is alert.   Psychiatric:         Mood and Affect: Mood normal.         Behavior: Behavior normal.       Ruddy Taylor MD

## 2024-03-21 DIAGNOSIS — I10 ESSENTIAL HYPERTENSION: ICD-10-CM

## 2024-03-21 RX ORDER — LISINOPRIL AND HYDROCHLOROTHIAZIDE 25; 20 MG/1; MG/1
1 TABLET ORAL DAILY
Qty: 90 TABLET | Refills: 1 | Status: SHIPPED | OUTPATIENT
Start: 2024-03-21

## 2024-03-25 ENCOUNTER — OFFICE VISIT (OUTPATIENT)
Dept: GYNECOLOGIC ONCOLOGY | Facility: CLINIC | Age: 44
End: 2024-03-25
Payer: COMMERCIAL

## 2024-03-25 VITALS
DIASTOLIC BLOOD PRESSURE: 80 MMHG | HEART RATE: 96 BPM | HEIGHT: 65 IN | BODY MASS INDEX: 48.82 KG/M2 | WEIGHT: 293 LBS | OXYGEN SATURATION: 98 % | SYSTOLIC BLOOD PRESSURE: 118 MMHG

## 2024-03-25 DIAGNOSIS — C54.1 ENDOMETRIAL CARCINOMA (HCC): Primary | ICD-10-CM

## 2024-03-25 DIAGNOSIS — Z90.710 HISTORY OF ROBOT-ASSISTED LAPAROSCOPIC HYSTERECTOMY: ICD-10-CM

## 2024-03-25 LAB — FUNGUS SPEC CULT: NORMAL

## 2024-03-25 PROCEDURE — 99215 OFFICE O/P EST HI 40 MIN: CPT | Performed by: OBSTETRICS & GYNECOLOGY

## 2024-03-25 NOTE — ASSESSMENT & PLAN NOTE
43yo with newly diagnosed stage IA endometrial adenocarcinoma, figo grade 1 presents for post op and treatment discussion    We discussed the results of her final pathology. We discussed that her case had been reviewed at our department pathology conference. We discussed the results of GOG 99, which was a phase III randomized trial of fully stage patients with intermediate risk endometrial cancer, which identified risk factors for patients who may benefit from postoperative radiation therapy in terms of local regional recurrences and not overall survival. She has a grade 1 tumor, no myometrial invasion and no lymphovascular space invasion. We discussed that having none of three risk factors she does not need postoperative radiation therapy.     We discussed surveillance, which according to NCCN guidelines, is a physical exam every 3-6 months for 2-3 years and then every 6 months or annually.  We discussed repeat imaging as clinically indicated.

## 2024-03-25 NOTE — PROGRESS NOTES
Assessment/Plan:    Problem List Items Addressed This Visit       Endometrial carcinoma (HCC) - Primary     45yo with newly diagnosed stage IA endometrial adenocarcinoma, figo grade 1 presents for post op and treatment discussion    We discussed the results of her final pathology. We discussed that her case had been reviewed at our department pathology conference. We discussed the results of GOG 99, which was a phase III randomized trial of fully stage patients with intermediate risk endometrial cancer, which identified risk factors for patients who may benefit from postoperative radiation therapy in terms of local regional recurrences and not overall survival. She has a grade 1 tumor, no myometrial invasion and no lymphovascular space invasion. We discussed that having none of three risk factors she does not need postoperative radiation therapy.     We discussed surveillance, which according to NCCN guidelines, is a physical exam every 3-6 months for 2-3 years and then every 6 months or annually.  We discussed repeat imaging as clinically indicated.              History of robot-assisted laparoscopic hysterectomy     Healing well post op  No further fevers or bleeding               CHIEF COMPLAINT: post op and treatment discussion       Problem:   Cancer Staging   Endometrial carcinoma (HCC)  Staging form: Corpus Uteri - Carcinoma, AJCC 8th Edition  - Clinical stage from 1/29/2024: FIGO Stage I (cT1, cM0) - Signed by Faustino Millan MD on 1/29/2024        Previous therapy:  Oncology History   Endometrial carcinoma (HCC)   1/26/2024 Initial Diagnosis    Endometrial carcinoma (HCC)     1/29/2024 -  Cancer Staged    Staging form: Corpus Uteri - Carcinoma, AJCC 8th Edition  - Clinical stage from 1/29/2024: FIGO Stage I (cT1, cM0) - Signed by Faustino Millan MD on 1/29/2024  Histopathologic type: Endometrioid adenocarcinoma, NOS  Stage prefix: Initial diagnosis  Histologic grade (G): G1  Histologic grading system: 3  grade system       2/27/2024 Surgery    RA-TLH/BSO/SLND    pMMR  HER2 2+ IHC, FISH neg           Patient ID: Antonella Irving is a 44 y.o. female  45yo with newly diagnosed stage IA endometrial adenocarcinoma, figo grade 1 presents for post op and treatment discussion. Post op c/b vaginal bleeding resulting in imaging showing pelvic collections. Collection was drained and did not result in any growth. No further fevers, bleeding sicne that time. Pain slowly improving.         The following portions of the patient's history were reviewed and updated as appropriate: allergies, current medications, past family history, past medical history, past social history, past surgical history, and problem list.    Review of Systems   Constitutional: Negative.    HENT: Negative.     Eyes: Negative.    Respiratory: Negative.     Cardiovascular: Negative.    Gastrointestinal: Negative.    Endocrine: Negative.    Genitourinary: Negative.    Musculoskeletal: Negative.    Neurological: Negative.    Psychiatric/Behavioral: Negative.         Current Outpatient Medications   Medication Sig Dispense Refill    albuterol (Ventolin HFA) 90 mcg/act inhaler Inhale 2 puffs every 6 (six) hours as needed for wheezing 18 g 1    ergocalciferol (VITAMIN D2) 50,000 units TAKE 1 CAPSULE BY MOUTH ONE TIME PER WEEK 12 capsule 1    FLUoxetine (PROzac) 40 MG capsule TAKE 1 CAPSULE BY MOUTH EVERY DAY 90 capsule 1    lisinopril-hydrochlorothiazide (PRINZIDE,ZESTORETIC) 20-25 MG per tablet TAKE 1 TABLET BY MOUTH EVERY DAY 90 tablet 1    rosuvastatin (CRESTOR) 5 mg tablet TAKE 1 TABLET (5 MG TOTAL) BY MOUTH DAILY. 90 tablet 1    meloxicam (MOBIC) 15 mg tablet Take 1 tablet (15 mg total) by mouth daily as needed for moderate pain (Patient not taking: Reported on 1/29/2024) 30 tablet 1    Zepbound 2.5 MG/0.5ML auto-injector Inject 2.5 mg under the skin once a week (Patient not taking: Reported on 1/29/2024)       No current facility-administered medications for  "this visit.           Objective:    Blood pressure 118/80, pulse 96, height 5' 5\" (1.651 m), weight (!) 151 kg (332 lb), last menstrual period 02/12/2024, SpO2 98%, unknown if currently breastfeeding.  Body mass index is 55.25 kg/m².  Body surface area is 2.46 meters squared.    Physical Exam  HENT:      Head: Normocephalic and atraumatic.   Cardiovascular:      Rate and Rhythm: Normal rate and regular rhythm.   Pulmonary:      Effort: Pulmonary effort is normal.   Abdominal:      General: There is no distension.      Palpations: Abdomen is soft. There is no mass.      Comments: Incisions c/d/i   Genitourinary:     Comments: Chaperone present for exam.  The external female genitalia is normal. The bartholin's, uretheral and skenes glands are normal. The urethral meatus is normal (midline with no lesions). Anus without fissure or lesion. Speculum exam reveals a grossly normal vagina cuff that is healing well. No masses, lesions,discharge or bleeding.  Bimanual exam notes a surgical absent cervix, uterus and adnexal structures. No masses or fullness. Bladder is without fullness, mass or tenderness.      Musculoskeletal:         General: Normal range of motion.      Cervical back: Normal range of motion.   Skin:     General: Skin is warm and dry.   Neurological:      Mental Status: She is alert and oriented to person, place, and time.           Lab Results   Component Value Date    K 4.3 03/15/2024     03/15/2024    CO2 25 03/15/2024    BUN 14 03/15/2024    CREATININE 0.76 03/15/2024    GLUF 98 02/14/2024    CALCIUM 8.9 03/15/2024    CORRECTEDCA 9.5 03/15/2024    AST 39 03/15/2024    ALT 28 03/15/2024    ALKPHOS 69 03/15/2024    EGFR 96 03/15/2024     Lab Results   Component Value Date    WBC 12.66 (H) 03/15/2024    HGB 7.1 (L) 03/15/2024    HCT 22.0 (L) 03/15/2024    MCV 87 03/15/2024     (H) 03/15/2024     Lab Results   Component Value Date    NEUTROABS 8.77 (H) 03/15/2024        Trend:  No results " "found for: \"\"     "

## 2024-04-01 LAB — FUNGUS SPEC CULT: NORMAL

## 2024-04-08 LAB — FUNGUS SPEC CULT: NORMAL

## 2024-04-15 LAB — FUNGUS SPEC CULT: NORMAL

## 2024-06-18 ENCOUNTER — APPOINTMENT (OUTPATIENT)
Dept: LAB | Facility: IMAGING CENTER | Age: 44
End: 2024-06-18
Payer: COMMERCIAL

## 2024-06-18 DIAGNOSIS — I10 ESSENTIAL HYPERTENSION: ICD-10-CM

## 2024-06-18 DIAGNOSIS — R73.9 HYPERGLYCEMIA: ICD-10-CM

## 2024-06-18 DIAGNOSIS — E78.49 OTHER HYPERLIPIDEMIA: ICD-10-CM

## 2024-06-18 LAB
ALBUMIN SERPL BCP-MCNC: 4 G/DL (ref 3.5–5)
ALP SERPL-CCNC: 47 U/L (ref 34–104)
ALT SERPL W P-5'-P-CCNC: 20 U/L (ref 7–52)
ANION GAP SERPL CALCULATED.3IONS-SCNC: 12 MMOL/L (ref 4–13)
AST SERPL W P-5'-P-CCNC: 28 U/L (ref 13–39)
BASOPHILS # BLD AUTO: 0.05 THOUSANDS/ÂΜL (ref 0–0.1)
BASOPHILS NFR BLD AUTO: 1 % (ref 0–1)
BILIRUB SERPL-MCNC: 0.45 MG/DL (ref 0.2–1)
BUN SERPL-MCNC: 18 MG/DL (ref 5–25)
CALCIUM SERPL-MCNC: 9.4 MG/DL (ref 8.4–10.2)
CHLORIDE SERPL-SCNC: 100 MMOL/L (ref 96–108)
CHOLEST SERPL-MCNC: 146 MG/DL
CO2 SERPL-SCNC: 26 MMOL/L (ref 21–32)
CREAT SERPL-MCNC: 0.9 MG/DL (ref 0.6–1.3)
EOSINOPHIL # BLD AUTO: 0.11 THOUSAND/ÂΜL (ref 0–0.61)
EOSINOPHIL NFR BLD AUTO: 2 % (ref 0–6)
ERYTHROCYTE [DISTWIDTH] IN BLOOD BY AUTOMATED COUNT: 13.4 % (ref 11.6–15.1)
EST. AVERAGE GLUCOSE BLD GHB EST-MCNC: 134 MG/DL
GFR SERPL CREATININE-BSD FRML MDRD: 78 ML/MIN/1.73SQ M
GLUCOSE P FAST SERPL-MCNC: 89 MG/DL (ref 65–99)
HBA1C MFR BLD: 6.3 %
HCT VFR BLD AUTO: 39.4 % (ref 34.8–46.1)
HDLC SERPL-MCNC: 56 MG/DL
HGB BLD-MCNC: 12.6 G/DL (ref 11.5–15.4)
IMM GRANULOCYTES # BLD AUTO: 0.01 THOUSAND/UL (ref 0–0.2)
IMM GRANULOCYTES NFR BLD AUTO: 0 % (ref 0–2)
LDLC SERPL CALC-MCNC: 53 MG/DL (ref 0–100)
LYMPHOCYTES # BLD AUTO: 1.9 THOUSANDS/ÂΜL (ref 0.6–4.47)
LYMPHOCYTES NFR BLD AUTO: 32 % (ref 14–44)
MCH RBC QN AUTO: 27.4 PG (ref 26.8–34.3)
MCHC RBC AUTO-ENTMCNC: 32 G/DL (ref 31.4–37.4)
MCV RBC AUTO: 86 FL (ref 82–98)
MONOCYTES # BLD AUTO: 0.5 THOUSAND/ÂΜL (ref 0.17–1.22)
MONOCYTES NFR BLD AUTO: 9 % (ref 4–12)
NEUTROPHILS # BLD AUTO: 3.3 THOUSANDS/ÂΜL (ref 1.85–7.62)
NEUTS SEG NFR BLD AUTO: 56 % (ref 43–75)
NRBC BLD AUTO-RTO: 0 /100 WBCS
PLATELET # BLD AUTO: 323 THOUSANDS/UL (ref 149–390)
PMV BLD AUTO: 10.2 FL (ref 8.9–12.7)
POTASSIUM SERPL-SCNC: 4.4 MMOL/L (ref 3.5–5.3)
PROT SERPL-MCNC: 6.7 G/DL (ref 6.4–8.4)
RBC # BLD AUTO: 4.6 MILLION/UL (ref 3.81–5.12)
SODIUM SERPL-SCNC: 138 MMOL/L (ref 135–147)
TRIGL SERPL-MCNC: 183 MG/DL
TSH SERPL DL<=0.05 MIU/L-ACNC: 1.15 UIU/ML (ref 0.45–4.5)
WBC # BLD AUTO: 5.87 THOUSAND/UL (ref 4.31–10.16)

## 2024-06-18 PROCEDURE — 36415 COLL VENOUS BLD VENIPUNCTURE: CPT

## 2024-06-18 PROCEDURE — 83036 HEMOGLOBIN GLYCOSYLATED A1C: CPT

## 2024-06-18 PROCEDURE — 80061 LIPID PANEL: CPT

## 2024-06-18 PROCEDURE — 84443 ASSAY THYROID STIM HORMONE: CPT

## 2024-06-18 PROCEDURE — 80053 COMPREHEN METABOLIC PANEL: CPT

## 2024-06-18 PROCEDURE — 85025 COMPLETE CBC W/AUTO DIFF WBC: CPT

## 2024-06-21 ENCOUNTER — OFFICE VISIT (OUTPATIENT)
Dept: FAMILY MEDICINE CLINIC | Facility: CLINIC | Age: 44
End: 2024-06-21
Payer: COMMERCIAL

## 2024-06-21 VITALS
BODY MASS INDEX: 48.82 KG/M2 | HEIGHT: 65 IN | WEIGHT: 293 LBS | SYSTOLIC BLOOD PRESSURE: 110 MMHG | DIASTOLIC BLOOD PRESSURE: 70 MMHG | OXYGEN SATURATION: 97 % | TEMPERATURE: 97.6 F | HEART RATE: 79 BPM | RESPIRATION RATE: 16 BRPM

## 2024-06-21 DIAGNOSIS — C54.1 ENDOMETRIAL CARCINOMA (HCC): ICD-10-CM

## 2024-06-21 DIAGNOSIS — I10 ESSENTIAL HYPERTENSION: Primary | ICD-10-CM

## 2024-06-21 DIAGNOSIS — R11.0 NAUSEA: ICD-10-CM

## 2024-06-21 DIAGNOSIS — F33.9 DEPRESSION, RECURRENT (HCC): ICD-10-CM

## 2024-06-21 DIAGNOSIS — E78.49 OTHER HYPERLIPIDEMIA: ICD-10-CM

## 2024-06-21 DIAGNOSIS — F41.9 ANXIETY: ICD-10-CM

## 2024-06-21 DIAGNOSIS — R73.9 HYPERGLYCEMIA: ICD-10-CM

## 2024-06-21 DIAGNOSIS — E66.01 CLASS 3 SEVERE OBESITY DUE TO EXCESS CALORIES WITH SERIOUS COMORBIDITY AND BODY MASS INDEX (BMI) OF 50.0 TO 59.9 IN ADULT (HCC): ICD-10-CM

## 2024-06-21 DIAGNOSIS — F51.01 PRIMARY INSOMNIA: ICD-10-CM

## 2024-06-21 PROCEDURE — 99214 OFFICE O/P EST MOD 30 MIN: CPT | Performed by: FAMILY MEDICINE

## 2024-06-21 RX ORDER — TIRZEPATIDE 5 MG/.5ML
5 INJECTION, SOLUTION SUBCUTANEOUS WEEKLY
Qty: 2 ML | Refills: 0 | Status: SHIPPED | OUTPATIENT
Start: 2024-06-21

## 2024-06-21 RX ORDER — ONDANSETRON 4 MG/1
4 TABLET, FILM COATED ORAL EVERY 8 HOURS PRN
Qty: 20 TABLET | Refills: 0 | Status: SHIPPED | OUTPATIENT
Start: 2024-06-21

## 2024-06-21 RX ORDER — TRAZODONE HYDROCHLORIDE 50 MG/1
50 TABLET ORAL
Qty: 30 TABLET | Refills: 2 | Status: SHIPPED | OUTPATIENT
Start: 2024-06-21

## 2024-06-21 NOTE — ASSESSMENT & PLAN NOTE
Not well-controlled. Reports increased after endometrial cancer diagnosis. Having trouble sleeping. Continue Prozac 40mg and add trazodone. Continue to monitor. Follow-up in 1 month

## 2024-06-21 NOTE — PROGRESS NOTES
Ambulatory Visit  Name: Antonella Irving      : 1980      MRN: [id number]  Encounter Provider: Ruddy Taylor MD  Encounter Date: 2024   Encounter department: [facility]    Assessment & Plan   1. Essential hypertension  Assessment & Plan:  Well-controlled on lisinopril-HCTZ once daily. Continue to monitor.  Orders:  -     CBC and differential; Future  -     Comprehensive metabolic panel; Future  -     Lipid Panel with Direct LDL reflex; Future  -     TSH, 3rd generation with Free T4 reflex; Future  2. Other hyperlipidemia  Assessment & Plan:  Stable. Continue Crestor 5mg daily. Will continue to monitor with fasting lipid panel.  Orders:  -     CBC and differential; Future  -     Comprehensive metabolic panel; Future  -     Lipid Panel with Direct LDL reflex; Future  -     TSH, 3rd generation with Free T4 reflex; Future  3. Hyperglycemia  Assessment & Plan:  It was discussed about low-carb diet and regular exercise. Will continue to monitor with A1c and fasting glucose.  Orders:  -     Hemoglobin A1C; Future  4. Class 3 severe obesity due to excess calories with serious comorbidity and body mass index (BMI) of 50.0 to 59.9 in adult (HCC)  Assessment & Plan:  Restart Zepbound 5mg SQ weekly injections. Discussed possible side effects. Follow-up in 1 month.  Orders:  -     tirzepatide (Zepbound) 5 mg/0.5 mL auto-injector; Inject 0.5 mL (5 mg total) under the skin once a week  5. Depression, recurrent (HCC)  Assessment & Plan:  Not well-controlled. Reports increased after endometrial cancer diagnosis. Having trouble sleeping. Continue Prozac 40mg and add trazodone. Continue to monitor. Follow-up in 1 month.  6. Anxiety  Assessment & Plan:  Not well-controlled. Reports increased after endometrial cancer diagnosis. Having trouble sleeping. Continue Prozac 40mg and add trazodone. Continue to monitor. Follow-up in 1 month   7. Endometrial carcinoma (HCC)  Assessment &  Plan:  Continue follow-up with gynecology oncology.  8. Nausea  -     ondansetron (ZOFRAN) 4 mg tablet; Take 1 tablet (4 mg total) by mouth every 8 (eight) hours as needed for nausea or vomiting  9. Primary insomnia  Assessment & Plan:  Start Trazodone 50mg daily at bedtime. Discussed possible side effects. Follow-up in 1 month.   Orders:  -     traZODone (DESYREL) 50 mg tablet; Take 1 tablet (50 mg total) by mouth daily at bedtime         History of Present Illness     45 y/o female presenting to the office for follow-up of multiple medical problems. She was diagnosed with endometrial cancer and had hysterectomy with complications of hemorrhage after surgery. She is reporting increased anxiety and difficulty sleeping due to this diagnosis and its complications. She reports decreased energy because of her lack of sleep. She is on Prozac 40mg and states that it helps with her symptoms but could be improved. She is taking all of her medications regularly without any reported side effects. We reviewed her blood work and patient expressed understanding. Her A1c is 6.3, triglycerides 163. She was on Zepbound but had to stop due to diagnosis and was advised to hold but would like to discuss starting Zepbound for weight loss.       Review of Systems   Constitutional:  Negative for chills and fever.   HENT:  Negative for ear pain and sore throat.    Eyes:  Negative for pain and visual disturbance.   Respiratory:  Negative for cough and shortness of breath.    Cardiovascular:  Negative for chest pain and palpitations.   Gastrointestinal:  Negative for abdominal pain and vomiting.   Genitourinary:  Negative for dysuria and hematuria.   Musculoskeletal:  Negative for arthralgias and back pain.   Skin:  Negative for color change and rash.   Neurological:  Negative for seizures and syncope.   Psychiatric/Behavioral:  Positive for dysphoric mood and sleep disturbance. The patient is nervous/anxious.    All other systems reviewed  and are negative.    Past Medical History:   Diagnosis Date   • Asthma    • Depression    • Hyperlipemia    • Hypertension    • Obesity, morbid, BMI 50 or higher (HCC)    • Papanicolaou smear of cervix with positive high risk human papilloma virus (HPV) test     12/2023 pap negative, + HPV type 16;  COLPO:   • Sleep apnea      Past Surgical History:   Procedure Laterality Date   • HERNIA REPAIR  1999   • IR ASPIRATION ONLY  3/12/2024   • GA INJECTION PROCEDURE LYMPHANGIOGRAPHY  2/27/2024    Procedure: LYMPHOGRAPHY WITH INDOCYANINE GREEN (ICG);  Surgeon: Daria Nuñez MD;  Location: [facility];  Service: Gynecology Oncology   • GA LAPS TOTAL HYSTERECT 250 GM/< W/RMVL TUBE/OVARY N/A 2/27/2024    Procedure: HYSTERECTOMY LAPAROSCOPIC TOTAL (LTH) W/ ROBOTICS, BILATERAL SALPINGO-OOPHORECTOMY, LYMPH NODE DISSECTION;  Surgeon: Daria Nuñez MD;  Location: [facility];  Service: Gynecology Oncology     Family History   Problem Relation Age of Onset   • Hypertension Mother    • Diabetes Mother         Mellitus   • Pancreatic cancer Father    • Diabetes Father         Mellitus   • Coronary artery disease Father    • Cancer Father         Pancreatic   • No Known Problems Sister    • No Known Problems Sister    • No Known Problems Sister    • No Known Problems Sister    • No Known Problems Brother    • No Known Problems Brother    • No Known Problems Brother    • No Known Problems Maternal Grandmother    • No Known Problems Maternal Grandfather    • Breast cancer Paternal Grandmother    • Cancer Paternal Grandmother         Breast   • No Known Problems Paternal Grandfather    • Cervical cancer Maternal Aunt    • Cancer Maternal Aunt         cervical   • Breast cancer Paternal Aunt 64   • Diabetes Family         Mellitus   • Colon cancer Neg Hx    • Ovarian cancer Neg Hx    • Uterine cancer Neg Hx      Social History     Tobacco Use   • Smoking status: Former     Current packs/day: 0.00     Types: Cigarettes     Quit date:  "2005     Years since quittin.4   • Smokeless tobacco: Never   • Tobacco comments:     10 per day. No passive smoke exposure   Vaping Use   • Vaping status: Never Used   Substance and Sexual Activity   • Alcohol use: Not Currently   • Drug use: Not Currently     Frequency: 1.0 times per week     Types: Marijuana     Comment: usage in highschool   • Sexual activity: Not Currently     Partners: Male     Birth control/protection: Condom Male     Comment: last SA one year ago     Current Outpatient Medications on File Prior to Visit   Medication Sig   • albuterol (Ventolin HFA) 90 mcg/act inhaler Inhale 2 puffs every 6 (six) hours as needed for wheezing   • FLUoxetine (PROzac) 40 MG capsule TAKE 1 CAPSULE BY MOUTH EVERY DAY   • lisinopril-hydrochlorothiazide (PRINZIDE,ZESTORETIC) 20-25 MG per tablet TAKE 1 TABLET BY MOUTH EVERY DAY   • rosuvastatin (CRESTOR) 5 mg tablet TAKE 1 TABLET (5 MG TOTAL) BY MOUTH DAILY.   • ergocalciferol (VITAMIN D2) 50,000 units TAKE 1 CAPSULE BY MOUTH ONE TIME PER WEEK   • meloxicam (MOBIC) 15 mg tablet Take 1 tablet (15 mg total) by mouth daily as needed for moderate pain (Patient not taking: Reported on 2024)   • [DISCONTINUED] Zepbound 2.5 MG/0.5ML auto-injector Inject 2.5 mg under the skin once a week (Patient not taking: Reported on 2024)     Allergies   Allergen Reactions   • Shellfish Allergy - Food Allergy Throat Swelling     Immunization History   Administered Date(s) Administered   • COVID-19 PFIZER VACCINE 0.3 ML IM 2021, 2021, 2022   • COVID-19 Pfizer vac (Riley-sucrose, gray cap) 12 yr+ IM 2022   • Fluzone Split Quad 0.25 mL 10/05/2017   • INFLUENZA 10/05/2017     Objective     /70 (BP Location: Left arm, Patient Position: Sitting, Cuff Size: Large)   Pulse 79   Temp 97.6 °F (36.4 °C) (Tympanic)   Resp 16   Ht 5' 5\" (1.651 m)   Wt (!) 153 kg (337 lb)   SpO2 97%   BMI 56.08 kg/m²     Physical Exam  Vitals and nursing note " reviewed.   Constitutional:       General: She is not in acute distress.     Appearance: She is well-developed.   HENT:      Head: Normocephalic and atraumatic.   Eyes:      Conjunctiva/sclera: Conjunctivae normal.   Cardiovascular:      Rate and Rhythm: Normal rate and regular rhythm.      Heart sounds: No murmur heard.  Pulmonary:      Effort: Pulmonary effort is normal. No respiratory distress.      Breath sounds: Normal breath sounds.   Abdominal:      Palpations: Abdomen is soft.      Tenderness: There is no abdominal tenderness.   Musculoskeletal:         General: No swelling.      Cervical back: Neck supple.   Skin:     General: Skin is warm and dry.      Capillary Refill: Capillary refill takes less than 2 seconds.   Neurological:      Mental Status: She is alert.   Psychiatric:         Mood and Affect: Mood normal.       Administrative Statements

## 2024-06-21 NOTE — ASSESSMENT & PLAN NOTE
Not well-controlled. Reports increased after endometrial cancer diagnosis. Having trouble sleeping. Continue Prozac 40mg and add trazodone. Continue to monitor. Follow-up in 1 month.

## 2024-06-21 NOTE — ASSESSMENT & PLAN NOTE
It was discussed about low-carb diet and regular exercise. Will continue to monitor with A1c and fasting glucose.

## 2024-07-02 ENCOUNTER — TELEPHONE (OUTPATIENT)
Age: 44
End: 2024-07-02

## 2024-07-02 NOTE — TELEPHONE ENCOUNTER
"Patient was calling to see what the hold up was with the tirzepatide (Zepbound) 5 mg/0.5 mL, it was added to her chart on 6/21/2024. And the provider told her it was sent in.     Per the medication list, it states \"This order has not been released to its destination.\"    Assuming this needs a Prior Auth.     Reviewed the previous Prior Auth and her insurance only authorized the 2.5 mg    Can a new PA be started for the 5 mg?  "

## 2024-07-08 ENCOUNTER — OFFICE VISIT (OUTPATIENT)
Dept: GYNECOLOGIC ONCOLOGY | Facility: CLINIC | Age: 44
End: 2024-07-08
Payer: COMMERCIAL

## 2024-07-08 VITALS
TEMPERATURE: 97.4 F | OXYGEN SATURATION: 98 % | WEIGHT: 293 LBS | BODY MASS INDEX: 54.91 KG/M2 | SYSTOLIC BLOOD PRESSURE: 112 MMHG | DIASTOLIC BLOOD PRESSURE: 78 MMHG | HEART RATE: 70 BPM

## 2024-07-08 DIAGNOSIS — Z85.42 ENCOUNTER FOR FOLLOW-UP SURVEILLANCE OF ENDOMETRIAL CANCER: Primary | ICD-10-CM

## 2024-07-08 DIAGNOSIS — Z08 ENCOUNTER FOR FOLLOW-UP SURVEILLANCE OF ENDOMETRIAL CANCER: Primary | ICD-10-CM

## 2024-07-08 DIAGNOSIS — E89.40 SURGICAL MENOPAUSE: ICD-10-CM

## 2024-07-08 PROBLEM — Z78.0 MENOPAUSE: Status: ACTIVE | Noted: 2024-07-08

## 2024-07-08 PROCEDURE — 99213 OFFICE O/P EST LOW 20 MIN: CPT | Performed by: OBSTETRICS & GYNECOLOGY

## 2024-07-08 NOTE — ASSESSMENT & PLAN NOTE
45yo with h/o Stage IA endometrial adenocarcinoma, figo grade 1 presents for surveillance visit.    Healed from surgery.  Exam c/w healed cuff and JULIETA    Continue with q3 month surveillance visits or sooner with symptoms. Will consdier spacing out next visit given low risk.

## 2024-07-08 NOTE — ASSESSMENT & PLAN NOTE
Pt with persistent emotional instability since surgery. Likely secondary to acute menopausal status. She is on prozac at baseline. She has no other symptoms of menopause. We discussed that effexor and paxil have been approved for menopausal symptoms including hot flashes however since she does not have these symptoms I would be hesitant to start her on a new antidepressant if her current medication is working.     She may need a dose adjustment in interim. She has appt with her PCP at the end of the month and will discuss further.

## 2024-07-08 NOTE — PROGRESS NOTES
Assessment/Plan:    Problem List Items Addressed This Visit       Encounter for follow-up surveillance of endometrial cancer - Primary     45yo with h/o Stage IA endometrial adenocarcinoma, figo grade 1 presents for surveillance visit.    Healed from surgery.  Exam c/w healed cuff and JULIETA    Continue with q3 month surveillance visits or sooner with symptoms. Will consdier spacing out next visit given low risk.              Surgical menopause     Pt with persistent emotional instability since surgery. Likely secondary to acute menopausal status. She is on prozac at baseline. She has no other symptoms of menopause. We discussed that effexor and paxil have been approved for menopausal symptoms including hot flashes however since she does not have these symptoms I would be hesitant to start her on a new antidepressant if her current medication is working.     She may need a dose adjustment in interim. She has appt with her PCP at the end of the month and will discuss further.                 CHIEF COMPLAINT: follow up       Problem:   Cancer Staging   Endometrial carcinoma (HCC)  Staging form: Corpus Uteri - Carcinoma, AJCC 8th Edition  - Clinical stage from 1/29/2024: FIGO Stage I (cT1, cM0) - Signed by Faustino Millan MD on 1/29/2024        Previous therapy:  Oncology History   Endometrial carcinoma (HCC)   1/26/2024 Initial Diagnosis    Endometrial carcinoma (HCC)     1/29/2024 -  Cancer Staged    Staging form: Corpus Uteri - Carcinoma, AJCC 8th Edition  - Clinical stage from 1/29/2024: FIGO Stage I (cT1, cM0) - Signed by Faustino Millan MD on 1/29/2024  Histopathologic type: Endometrioid adenocarcinoma, NOS  Stage prefix: Initial diagnosis  Histologic grade (G): G1  Histologic grading system: 3 grade system       2/27/2024 Surgery    RA-TLH/BSO/SLND    pMMR  HER2 2+ IHC, FISH neg           Patient ID: Antonella Irving is a 44 y.o. female  45yo with h/o Stage IA endometrial adenocarcinoma, figo grade 1 presents  for surveillance visit. 3 months s/p surgical intervention. No adjuvant therapy recommended.  Patient reports that she has recovered well from surgery.  She has noticed a change in her emotional stability with frequent crying episodes. No other menopausal symptoms. No vaginal bleeding/discharge. No changes in urination or bowel movements         The following portions of the patient's history were reviewed and updated as appropriate: allergies, current medications, past family history, past medical history, past social history, past surgical history, and problem list.    Review of Systems   Constitutional: Negative.    HENT: Negative.     Eyes: Negative.    Respiratory: Negative.     Cardiovascular: Negative.    Gastrointestinal: Negative.    Endocrine: Negative.    Genitourinary: Negative.    Musculoskeletal: Negative.    Neurological: Negative.    Psychiatric/Behavioral: Negative.          +emotional lability       Current Outpatient Medications   Medication Sig Dispense Refill    albuterol (Ventolin HFA) 90 mcg/act inhaler Inhale 2 puffs every 6 (six) hours as needed for wheezing 18 g 1    ergocalciferol (VITAMIN D2) 50,000 units TAKE 1 CAPSULE BY MOUTH ONE TIME PER WEEK 12 capsule 1    FLUoxetine (PROzac) 40 MG capsule TAKE 1 CAPSULE BY MOUTH EVERY DAY 90 capsule 1    lisinopril-hydrochlorothiazide (PRINZIDE,ZESTORETIC) 20-25 MG per tablet TAKE 1 TABLET BY MOUTH EVERY DAY 90 tablet 1    ondansetron (ZOFRAN) 4 mg tablet Take 1 tablet (4 mg total) by mouth every 8 (eight) hours as needed for nausea or vomiting 20 tablet 0    rosuvastatin (CRESTOR) 5 mg tablet TAKE 1 TABLET (5 MG TOTAL) BY MOUTH DAILY. 90 tablet 1    tirzepatide (Zepbound) 5 mg/0.5 mL auto-injector Inject 0.5 mL (5 mg total) under the skin once a week 2 mL 0    traZODone (DESYREL) 50 mg tablet Take 1 tablet (50 mg total) by mouth daily at bedtime 30 tablet 2    meloxicam (MOBIC) 15 mg tablet Take 1 tablet (15 mg total) by mouth daily as needed for  moderate pain (Patient not taking: Reported on 1/29/2024) 30 tablet 1     No current facility-administered medications for this visit.           Objective:    Blood pressure 112/78, pulse 70, temperature (!) 97.4 °F (36.3 °C), temperature source Temporal, weight (!) 150 kg (330 lb), SpO2 98%, unknown if currently breastfeeding.  Body mass index is 54.91 kg/m².  Body surface area is 2.45 meters squared.    Physical Exam  HENT:      Head: Normocephalic and atraumatic.   Cardiovascular:      Rate and Rhythm: Normal rate and regular rhythm.   Pulmonary:      Effort: Pulmonary effort is normal.   Abdominal:      General: There is no distension.      Palpations: Abdomen is soft. There is no mass.   Genitourinary:     Comments: Chaperone present for exam.  The external female genitalia is normal. The bartholin's, uretheral and skenes glands are normal. The urethral meatus is normal (midline with no lesions). Anus without fissure or lesion. Speculum exam reveals a grossly normal vagina cuff. No masses, lesions,discharge or bleeding. No significant cystocele or rectocele noted. Bimanual exam notes a surgical absent cervix, uterus and adnexal structures. No masses or fullness. Bladder is without fullness, mass or tenderness.    Musculoskeletal:         General: Normal range of motion.      Cervical back: Normal range of motion.   Skin:     General: Skin is warm and dry.   Neurological:      Mental Status: She is alert and oriented to person, place, and time.           Lab Results   Component Value Date    K 4.4 06/18/2024     06/18/2024    CO2 26 06/18/2024    BUN 18 06/18/2024    CREATININE 0.90 06/18/2024    GLUF 89 06/18/2024    CALCIUM 9.4 06/18/2024    CORRECTEDCA 9.5 03/15/2024    AST 28 06/18/2024    ALT 20 06/18/2024    ALKPHOS 47 06/18/2024    EGFR 78 06/18/2024     Lab Results   Component Value Date    WBC 5.87 06/18/2024    HGB 12.6 06/18/2024    HCT 39.4 06/18/2024    MCV 86 06/18/2024     06/18/2024  "    Lab Results   Component Value Date    NEUTROABS 3.30 06/18/2024        Trend:  No results found for: \"\"     "

## 2024-07-09 ENCOUNTER — TELEPHONE (OUTPATIENT)
Age: 44
End: 2024-07-09

## 2024-07-09 DIAGNOSIS — E66.01 CLASS 3 SEVERE OBESITY DUE TO EXCESS CALORIES WITH SERIOUS COMORBIDITY AND BODY MASS INDEX (BMI) OF 50.0 TO 59.9 IN ADULT (HCC): Primary | ICD-10-CM

## 2024-07-09 DIAGNOSIS — E66.01 CLASS 3 SEVERE OBESITY DUE TO EXCESS CALORIES WITH SERIOUS COMORBIDITY AND BODY MASS INDEX (BMI) OF 50.0 TO 59.9 IN ADULT (HCC): ICD-10-CM

## 2024-07-09 RX ORDER — TIRZEPATIDE 7.5 MG/.5ML
7.5 INJECTION, SOLUTION SUBCUTANEOUS WEEKLY
Qty: 2 ML | Refills: 0 | Status: SHIPPED | OUTPATIENT
Start: 2024-07-09

## 2024-07-09 NOTE — TELEPHONE ENCOUNTER
Patient stated  her needle broke when she was injecting her 3rd dose of Rx: Zepbound 2.5mg.    Patient would like to know if she should continue on the 2.5 mg or should she start the new dose of 7.5mg.    Please review and advice  Than you

## 2024-07-09 NOTE — TELEPHONE ENCOUNTER
Patient contacted the office this morning stating that she was going to inject the Zepbound but the needle broke, she was unsure what to do as she doesn't want to miss a dose. I did instruct to reach out to the pharmacy to see what they would suggest. Also was questioning PA for higher dose of Zepbound, will finish current dose next week and wants to know if this doesn't get approved by then what she should do.

## 2024-07-10 ENCOUNTER — TELEPHONE (OUTPATIENT)
Dept: FAMILY MEDICINE CLINIC | Facility: CLINIC | Age: 44
End: 2024-07-10

## 2024-07-10 DIAGNOSIS — E66.01 CLASS 3 SEVERE OBESITY DUE TO EXCESS CALORIES WITH SERIOUS COMORBIDITY AND BODY MASS INDEX (BMI) OF 50.0 TO 59.9 IN ADULT (HCC): Primary | ICD-10-CM

## 2024-07-10 NOTE — TELEPHONE ENCOUNTER
I called pt and she would like to continue with the zepbound 2.5 mg since she messed up two doses this month. The zepbound 7.5 mg was sent and they do not have it in stock when I called.    Please advise

## 2024-07-10 NOTE — TELEPHONE ENCOUNTER
Patient called regarding status of request for Zepbound. Warm transfer to office; patient advised it will be reviewed. She will be called back with result.

## 2024-07-10 NOTE — TELEPHONE ENCOUNTER
Call from access who said patient is asking about the status of his prior auth.  I see no faxes sent to the Pod.

## 2024-07-11 DIAGNOSIS — E66.01 CLASS 3 SEVERE OBESITY DUE TO EXCESS CALORIES WITH SERIOUS COMORBIDITY AND BODY MASS INDEX (BMI) OF 50.0 TO 59.9 IN ADULT (HCC): ICD-10-CM

## 2024-07-11 RX ORDER — TIRZEPATIDE 2.5 MG/.5ML
2.5 INJECTION, SOLUTION SUBCUTANEOUS WEEKLY
Qty: 2 ML | Refills: 0 | Status: SHIPPED | OUTPATIENT
Start: 2024-07-11 | End: 2024-07-26

## 2024-07-11 RX ORDER — TIRZEPATIDE 2.5 MG/.5ML
2.5 INJECTION, SOLUTION SUBCUTANEOUS WEEKLY
Qty: 2 ML | Refills: 0 | Status: SHIPPED | OUTPATIENT
Start: 2024-07-11 | End: 2024-08-08

## 2024-07-11 NOTE — TELEPHONE ENCOUNTER
Medication:   tirzepatide (Zepbound) 2.5 mg/0.5     Dose/Frequency: 2.5 mg     Quantity: 2 ml     [id number] [address] [[id number]]     Office:   [x] PCP/Provider -   [] Speciality/Provider -     Does the patient have enough for 3 days?   [] Yes   [x] No - Send as HP to POD       Script was sent to another pharmacy but they did not have it, can you please send it to the [facility] in [place] as listed above. Thank you so much!

## 2024-07-12 DIAGNOSIS — E55.9 VITAMIN D DEFICIENCY: ICD-10-CM

## 2024-07-12 RX ORDER — TIRZEPATIDE 2.5 MG/.5ML
2.5 INJECTION, SOLUTION SUBCUTANEOUS WEEKLY
Qty: 2 ML | Refills: 0 | OUTPATIENT
Start: 2024-07-12 | End: 2024-08-09

## 2024-07-12 RX ORDER — ERGOCALCIFEROL 1.25 MG/1
CAPSULE ORAL
Qty: 12 CAPSULE | Refills: 1 | Status: SHIPPED | OUTPATIENT
Start: 2024-07-12

## 2024-07-13 DIAGNOSIS — F51.01 PRIMARY INSOMNIA: ICD-10-CM

## 2024-07-14 RX ORDER — TRAZODONE HYDROCHLORIDE 50 MG/1
50 TABLET ORAL
Qty: 100 TABLET | Refills: 1 | Status: SHIPPED | OUTPATIENT
Start: 2024-07-14

## 2024-07-15 ENCOUNTER — TELEPHONE (OUTPATIENT)
Age: 44
End: 2024-07-15

## 2024-07-15 NOTE — TELEPHONE ENCOUNTER
Reason for call:   [x] Prior Auth  [] Other:     Caller:  [x] Patient  [] Pharmacy  Name:   Address:   Callback Number:     Medication: tirzepatide (Zepbound) 2.5 mg/0.5 mL auto-injector     Dose/Frequency: INJECT 0.5 ML (2.5 MG TOTAL) UNDER THE SKIN ONCE A WEEK FOR 28 DAYS,     Quantity:  2 mL     Ordering Provider: Ruddy Taylor MD   [x] PCP/Provider -   [] Speciality/Provider -     Has the patient tried other medications and failed? If failed, which medications did they fail?    [] No   [] Yes -     Is the patient's insurance updated in EPIC?   [x] Yes   [] No     Is a copy of the patient's insurance scanned in EPIC?   [x] Yes   [] No       The first balloon was inserted into the left anterior descending and middle left anterior descending.    Balloon reinflated a second time:

## 2024-07-17 NOTE — TELEPHONE ENCOUNTER
I do not see an approval for the 2.5mg  Zepbound 5mg is not on active med list - Please clarify which medication needs a PA

## 2024-07-18 NOTE — TELEPHONE ENCOUNTER
PA for Zepbound 2.5mg    Submitted via    [x]CMM-KEY [id number]  []Surescripts-Case ID #   []Faxed to plan   []Other website   []Phone call Case ID #     Office notes sent, clinical questions answered. Awaiting determination    Turnaround time for your insurance to make a decision on your Prior Authorization can take 7-21 business days.

## 2024-07-19 ENCOUNTER — RA CDI HCC (OUTPATIENT)
Dept: OTHER | Facility: HOSPITAL | Age: 44
End: 2024-07-19

## 2024-07-19 NOTE — PROGRESS NOTES
HCC coding opportunities          Chart Reviewed number of suggestions sent to Provider: 1   J45.20    Patients Insurance        Commercial Insurance: [employer]

## 2024-07-19 NOTE — TELEPHONE ENCOUNTER
PA for tirzepatide (Zepbound) 2.5 mg/0.5 mL auto-injector  Approved     Date(s) approved 05/18/2024 - 02/17/2025    Case #[id number]    Patient advised by          [] MyChart Message  [x] Phone call   []LMOM  []L/M to call office as no active Communication consent on file  []Unable to leave detailed message as VM not approved on Communication consent       Pharmacy advised by    [x]Fax  []Phone call    Approval letter scanned into Media Yes

## 2024-07-26 ENCOUNTER — OFFICE VISIT (OUTPATIENT)
Dept: FAMILY MEDICINE CLINIC | Facility: CLINIC | Age: 44
End: 2024-07-26
Payer: COMMERCIAL

## 2024-07-26 VITALS
RESPIRATION RATE: 16 BRPM | WEIGHT: 293 LBS | DIASTOLIC BLOOD PRESSURE: 70 MMHG | HEART RATE: 78 BPM | HEIGHT: 65 IN | SYSTOLIC BLOOD PRESSURE: 108 MMHG | BODY MASS INDEX: 48.82 KG/M2 | TEMPERATURE: 97.8 F | OXYGEN SATURATION: 97 %

## 2024-07-26 DIAGNOSIS — K21.9 GASTROESOPHAGEAL REFLUX DISEASE WITHOUT ESOPHAGITIS: ICD-10-CM

## 2024-07-26 DIAGNOSIS — E66.01 CLASS 3 SEVERE OBESITY DUE TO EXCESS CALORIES WITH SERIOUS COMORBIDITY AND BODY MASS INDEX (BMI) OF 50.0 TO 59.9 IN ADULT (HCC): Primary | ICD-10-CM

## 2024-07-26 PROCEDURE — 99213 OFFICE O/P EST LOW 20 MIN: CPT | Performed by: FAMILY MEDICINE

## 2024-07-26 RX ORDER — TIRZEPATIDE 5 MG/.5ML
5 INJECTION, SOLUTION SUBCUTANEOUS WEEKLY
Qty: 2 ML | Refills: 0 | Status: SHIPPED | OUTPATIENT
Start: 2024-07-26

## 2024-07-26 RX ORDER — PANTOPRAZOLE SODIUM 40 MG/1
40 TABLET, DELAYED RELEASE ORAL
Qty: 30 TABLET | Refills: 1 | Status: SHIPPED | OUTPATIENT
Start: 2024-07-26

## 2024-07-26 NOTE — PROGRESS NOTES
Ambulatory Visit  Name: Antonella Irving      : 1980      MRN: [id number]  Encounter Provider: Ruddy Taylor MD  Encounter Date: 2024   Encounter department: [facility] [address] [address] [facility]    Assessment & Plan   1. Class 3 severe obesity due to excess calories with serious comorbidity and body mass index (BMI) of 50.0 to 59.9 in adult ([facility])  Assessment & Plan:  Start Zepbound 5mg SQ weekly injections. Discussed possible side effects. Continue to follow.   Orders:  -     tirzepatide (Zepbound) 5 mg/0.5 mL auto-injector; Inject 0.5 mL (5 mg total) under the skin once a week  2. Gastroesophageal reflux disease without esophagitis  -     pantoprazole (PROTONIX) 40 mg tablet; Take 1 tablet (40 mg total) by mouth daily before breakfast         History of Present Illness     Patient presents for 1 month follow up for weight management, currently managed on zepbound 2.5mg. Presently without complaint and denies any medication side effects. Weight has decreased by 7lbs since her last visit. Discussed continue diet and lifestyle management.       Review of Systems   Constitutional:  Negative for chills and fever.   HENT:  Negative for ear pain and sore throat.    Eyes:  Negative for pain and visual disturbance.   Respiratory:  Negative for cough and shortness of breath.    Cardiovascular:  Negative for chest pain and palpitations.   Gastrointestinal:  Negative for abdominal pain and vomiting.   Genitourinary:  Negative for dysuria and hematuria.   Musculoskeletal:  Negative for arthralgias and back pain.   Skin:  Negative for color change and rash.   Neurological:  Negative for seizures and syncope.   All other systems reviewed and are negative.    Past Medical History:   Diagnosis Date   • Asthma    • Depression    • Hyperlipemia    • Hypertension    • Obesity, morbid, BMI 50 or higher ([facility])    • Papanicolaou smear of cervix with positive high risk human papilloma virus (HPV) test     2023 pap  negative, + HPV type 16;  COLPO:   • Sleep apnea      Past Surgical History:   Procedure Laterality Date   • HERNIA REPAIR     • IR ASPIRATION ONLY  3/12/2024   • [place] INJECTION PROCEDURE LYMPHANGIOGRAPHY  2024    Procedure: LYMPHOGRAPHY WITH INDOCYANINE GREEN (ICG);  Surgeon: Daria Nuñez MD;  Location: [facility] MAIN OR;  Service: Gynecology Oncology   • [place] LAPS TOTAL HYSTERECT 250 GM/< W/RMVL TUBE/OVARY N/A 2024    Procedure: HYSTERECTOMY LAPAROSCOPIC TOTAL (LTH) W/ ROBOTICS, BILATERAL SALPINGO-OOPHORECTOMY, LYMPH NODE DISSECTION;  Surgeon: Daria Nuñez MD;  Location: [facility];  Service: Gynecology Oncology     Family History   Problem Relation Age of Onset   • Hypertension Mother    • Diabetes Mother         Mellitus   • Pancreatic cancer Father    • Diabetes Father         Mellitus   • Coronary artery disease Father    • Cancer Father         Pancreatic   • No Known Problems Sister    • No Known Problems Sister    • No Known Problems Sister    • No Known Problems Sister    • No Known Problems Brother    • No Known Problems Brother    • No Known Problems Brother    • No Known Problems Maternal Grandmother    • No Known Problems Maternal Grandfather    • Breast cancer Paternal Grandmother    • Cancer Paternal Grandmother         Breast   • No Known Problems Paternal Grandfather    • Cervical cancer Maternal Aunt    • Cancer Maternal Aunt         cervical   • Breast cancer Paternal Aunt 64   • Diabetes Family         Mellitus   • Colon cancer Neg Hx    • Ovarian cancer Neg Hx    • Uterine cancer Neg Hx      Social History     Tobacco Use   • Smoking status: Former     Current packs/day: 0.00     Types: Cigarettes     Quit date: 2005     Years since quittin.5   • Smokeless tobacco: Never   • Tobacco comments:     10 per day. No passive smoke exposure   Vaping Use   • Vaping status: Never Used   Substance and Sexual Activity   • Alcohol use: Not Currently   • Drug use: Not Currently     Frequency:  "1.0 times per week     Types: Marijuana     Comment: usage in highschool   • Sexual activity: Not Currently     Partners: Male     Birth control/protection: Condom Male     Comment: last SA one year ago     Current Outpatient Medications on File Prior to Visit   Medication Sig   • albuterol (Ventolin HFA) 90 mcg/act inhaler Inhale 2 puffs every 6 (six) hours as needed for wheezing   • ergocalciferol (VITAMIN D2) 50,000 units TAKE 1 CAPSULE BY MOUTH ONE TIME PER WEEK   • FLUoxetine (PROzac) 40 MG capsule TAKE 1 CAPSULE BY MOUTH EVERY DAY   • lisinopril-hydrochlorothiazide (PRINZIDE,ZESTORETIC) 20-25 MG per tablet TAKE 1 TABLET BY MOUTH EVERY DAY   • ondansetron (ZOFRAN) 4 mg tablet Take 1 tablet (4 mg total) by mouth every 8 (eight) hours as needed for nausea or vomiting   • rosuvastatin (CRESTOR) 5 mg tablet TAKE 1 TABLET (5 MG TOTAL) BY MOUTH DAILY.   • traZODone (DESYREL) 50 mg tablet TAKE 1 TABLET BY MOUTH DAILY AT BEDTIME   • meloxicam (MOBIC) 15 mg tablet Take 1 tablet (15 mg total) by mouth daily as needed for moderate pain (Patient not taking: Reported on 1/29/2024)     Allergies   Allergen Reactions   • Shellfish Allergy - Food Allergy Throat Swelling     Immunization History   Administered Date(s) Administered   • COVID-19 PFIZER VACCINE 0.3 ML IM 08/02/2021, 08/23/2021, 02/22/2022   • COVID-19 Pfizer vac (Riley-sucrose, gray cap) 12 yr+ IM 02/22/2022   • Fluzone Split Quad 0.25 mL 10/05/2017   • INFLUENZA 10/05/2017     Objective     /70 (BP Location: Left arm, Patient Position: Sitting, Cuff Size: Large)   Pulse 78   Temp 97.8 °F (36.6 °C) (Tympanic)   Resp 16   Ht 5' 5\" (1.651 m)   Wt (!) 150 kg (330 lb 6.4 oz)   SpO2 97%   BMI 54.98 kg/m²     Physical Exam  Vitals and nursing note reviewed.   Constitutional:       General: She is not in acute distress.     Appearance: She is well-developed.   HENT:      Head: Normocephalic and atraumatic.   Eyes:      Conjunctiva/sclera: Conjunctivae normal. "   Cardiovascular:      Rate and Rhythm: Normal rate and regular rhythm.      Heart sounds: No murmur heard.  Pulmonary:      Effort: Pulmonary effort is normal. No respiratory distress.      Breath sounds: Normal breath sounds.   Abdominal:      Palpations: Abdomen is soft.      Tenderness: There is no abdominal tenderness.   Musculoskeletal:         General: No swelling.      Cervical back: Neck supple.   Skin:     General: Skin is warm and dry.      Capillary Refill: Capillary refill takes less than 2 seconds.   Neurological:      Mental Status: She is alert.   Psychiatric:         Mood and Affect: Mood normal.

## 2024-07-27 NOTE — PROGRESS NOTES
Depression Screening Follow-up Plan: Patient's depression screening was positive with a PHQ-2 score of . Their PHQ-9 score was 12. Patient assessed for underlying major depression. They have no active suicidal ideations. Brief counseling provided and recommend additional follow-up/re-evaluation next office visit.

## 2024-08-19 DIAGNOSIS — I10 ESSENTIAL HYPERTENSION: ICD-10-CM

## 2024-08-19 RX ORDER — LISINOPRIL AND HYDROCHLOROTHIAZIDE 20; 25 MG/1; MG/1
1 TABLET ORAL DAILY
Qty: 90 TABLET | Refills: 1 | Status: SHIPPED | OUTPATIENT
Start: 2024-08-19

## 2024-08-20 DIAGNOSIS — F32.A DEPRESSION, UNSPECIFIED DEPRESSION TYPE: ICD-10-CM

## 2024-08-20 RX ORDER — FLUOXETINE 40 MG/1
CAPSULE ORAL
Qty: 90 CAPSULE | Refills: 1 | Status: SHIPPED | OUTPATIENT
Start: 2024-08-20

## 2024-08-22 DIAGNOSIS — K21.9 GASTROESOPHAGEAL REFLUX DISEASE WITHOUT ESOPHAGITIS: ICD-10-CM

## 2024-08-22 RX ORDER — PANTOPRAZOLE SODIUM 40 MG/1
40 TABLET, DELAYED RELEASE ORAL
Qty: 90 TABLET | Refills: 1 | Status: SHIPPED | OUTPATIENT
Start: 2024-08-22

## 2024-08-26 ENCOUNTER — OFFICE VISIT (OUTPATIENT)
Dept: FAMILY MEDICINE CLINIC | Facility: CLINIC | Age: 44
End: 2024-08-26
Payer: COMMERCIAL

## 2024-08-26 VITALS
WEIGHT: 293 LBS | TEMPERATURE: 97.8 F | HEART RATE: 69 BPM | OXYGEN SATURATION: 98 % | SYSTOLIC BLOOD PRESSURE: 108 MMHG | DIASTOLIC BLOOD PRESSURE: 76 MMHG | BODY MASS INDEX: 48.82 KG/M2 | HEIGHT: 65 IN | RESPIRATION RATE: 16 BRPM

## 2024-08-26 DIAGNOSIS — E78.49 OTHER HYPERLIPIDEMIA: ICD-10-CM

## 2024-08-26 DIAGNOSIS — I10 ESSENTIAL HYPERTENSION: ICD-10-CM

## 2024-08-26 DIAGNOSIS — E66.01 CLASS 3 SEVERE OBESITY DUE TO EXCESS CALORIES WITH SERIOUS COMORBIDITY AND BODY MASS INDEX (BMI) OF 50.0 TO 59.9 IN ADULT (HCC): Primary | ICD-10-CM

## 2024-08-26 DIAGNOSIS — Z00.01 ABNORMAL WELLNESS EXAM: ICD-10-CM

## 2024-08-26 DIAGNOSIS — R00.2 PALPITATION: ICD-10-CM

## 2024-08-26 PROCEDURE — 99214 OFFICE O/P EST MOD 30 MIN: CPT | Performed by: FAMILY MEDICINE

## 2024-08-26 PROCEDURE — 99396 PREV VISIT EST AGE 40-64: CPT | Performed by: FAMILY MEDICINE

## 2024-08-26 RX ORDER — TIRZEPATIDE 7.5 MG/.5ML
7.5 INJECTION, SOLUTION SUBCUTANEOUS WEEKLY
Qty: 2 ML | Refills: 0 | Status: SHIPPED | OUTPATIENT
Start: 2024-08-26 | End: 2024-08-30 | Stop reason: SDUPTHER

## 2024-08-26 NOTE — ASSESSMENT & PLAN NOTE
It was discussed about immunizations, diet, exercise and safety measures.   Patient's daughter LVM requesting MRI results.

## 2024-08-26 NOTE — ASSESSMENT & PLAN NOTE
Improving.  She lost 11 pounds since her last visit.  I am going to increase Zepbound to 7.5 mg weekly.  Discussed with possible side effect.  Come back in 1 month.

## 2024-08-26 NOTE — PROGRESS NOTES
Ambulatory Visit  Name: Antonella Irving      : 1980      MRN: [id number]  Encounter Provider: Ruddy Taylor MD  Encounter Date: 2024   [facility]    Assessment & Plan   1. Class 3 severe obesity due to excess calories with serious comorbidity and body mass index (BMI) of 50.0 to 59.9 in adult (HCC)  Assessment & Plan:  Improving.  She lost 11 pounds since her last visit.  I am going to increase Zepbound to 7.5 mg weekly.  Discussed with possible side effect.  Come back in 1 month.  Orders:  -     tirzepatide (Zepbound) 7.5 mg/0.5 mL auto-injector; Inject 0.5 mL (7.5 mg total) under the skin once a week  2. Palpitation  Assessment & Plan:  I am going to check a blood work and Holter monitor.  If symptoms worse go to the emergency room.  Orders:  -     CBC and differential; Future  -     Comprehensive metabolic panel; Future  -     TSH, 3rd generation with Free T4 reflex; Future  -     Holter monitor; Future; Expected date: 2024  3. Abnormal wellness exam  Assessment & Plan:  It was discussed about immunizations, diet, exercise and safety measures.  4. Other hyperlipidemia  Assessment & Plan:  Stable. Continue Crestor 5mg daily. Will continue to monitor with fasting lipid panel.  5. Essential hypertension  Assessment & Plan:  Well-controlled on lisinopril-HCTZ once daily. Continue to monitor.         History of Present Illness     She is here today for follow-up multiple medical problems.  She has been taking her medications.  She denies any side effects.  She has been having problems with palpitations.  She denies any chest pain or shortness of breath.  She has been using Zepbound 5 mg weekly.  She lost 11 pounds since her last visit.  She denies any significant side effects.    Palpitations  Pertinent negatives include no abdominal pain, chest pain, chills, coughing, fever, headaches or rash.     Review of Systems   Constitutional:  Negative  for chills and fever.   HENT:  Negative for trouble swallowing.    Eyes:  Negative for visual disturbance.   Respiratory:  Negative for cough and shortness of breath.    Cardiovascular:  Positive for palpitations. Negative for chest pain and leg swelling.   Gastrointestinal:  Negative for abdominal pain, constipation and diarrhea.   Endocrine: Negative for cold intolerance and heat intolerance.   Genitourinary:  Negative for difficulty urinating and dysuria.   Musculoskeletal:  Negative for gait problem.   Skin:  Negative for rash.   Neurological:  Negative for dizziness, tremors, seizures and headaches.   Hematological:  Negative for adenopathy.   Psychiatric/Behavioral:  Negative for behavioral problems.      Past Medical History:   Diagnosis Date   • Asthma    • Depression    • Hyperlipemia    • Hypertension    • Obesity, morbid, BMI 50 or higher (HCC)    • Papanicolaou smear of cervix with positive high risk human papilloma virus (HPV) test     12/2023 pap negative, + HPV type 16;  COLPO:   • Sleep apnea      Past Surgical History:   Procedure Laterality Date   • HERNIA REPAIR  1999   • IR ASPIRATION ONLY  3/12/2024   • [place] INJECTION PROCEDURE LYMPHANGIOGRAPHY  2/27/2024    Procedure: LYMPHOGRAPHY WITH INDOCYANINE GREEN (ICG);  Surgeon: Daria Nuñez MD;  Location: [facility];  Service: Gynecology Oncology   • [place] LAPS TOTAL HYSTERECT 250 GM/< W/RMVL TUBE/OVARY N/A 2/27/2024    Procedure: HYSTERECTOMY LAPAROSCOPIC TOTAL (LTH) W/ ROBOTICS, BILATERAL SALPINGO-OOPHORECTOMY, LYMPH NODE DISSECTION;  Surgeon: Daria Nuñez MD;  Location: [facility];  Service: Gynecology Oncology     Family History   Problem Relation Age of Onset   • Hypertension Mother    • Diabetes Mother         Mellitus   • Pancreatic cancer Father    • Diabetes Father         Mellitus   • Coronary artery disease Father    • Cancer Father         Pancreatic   • No Known Problems Sister    • No Known Problems Sister    • No Known Problems Sister    •  No Known Problems Sister    • No Known Problems Brother    • No Known Problems Brother    • No Known Problems Brother    • No Known Problems Maternal Grandmother    • No Known Problems Maternal Grandfather    • Breast cancer Paternal Grandmother    • Cancer Paternal Grandmother         Breast   • No Known Problems Paternal Grandfather    • Cervical cancer Maternal Aunt    • Cancer Maternal Aunt         cervical   • Breast cancer Paternal Aunt 64   • Diabetes Family         Mellitus   • Colon cancer Neg Hx    • Ovarian cancer Neg Hx    • Uterine cancer Neg Hx      Social History     Tobacco Use   • Smoking status: Former     Current packs/day: 0.00     Types: Cigarettes     Quit date: 2005     Years since quittin.6   • Smokeless tobacco: Never   • Tobacco comments:     10 per day. No passive smoke exposure   Vaping Use   • Vaping status: Never Used   Substance and Sexual Activity   • Alcohol use: Not Currently   • Drug use: Not Currently     Frequency: 1.0 times per week     Types: Marijuana     Comment: usage in highschool   • Sexual activity: Not Currently     Partners: Male     Birth control/protection: Condom Male     Comment: last SA one year ago     Current Outpatient Medications on File Prior to Visit   Medication Sig   • albuterol (Ventolin HFA) 90 mcg/act inhaler Inhale 2 puffs every 6 (six) hours as needed for wheezing   • ergocalciferol (VITAMIN D2) 50,000 units TAKE 1 CAPSULE BY MOUTH ONE TIME PER WEEK   • FLUoxetine (PROzac) 40 MG capsule TAKE 1 CAPSULE BY MOUTH EVERY DAY   • lisinopril-hydrochlorothiazide (PRINZIDE,ZESTORETIC) 20-25 MG per tablet TAKE 1 TABLET BY MOUTH EVERY DAY   • ondansetron (ZOFRAN) 4 mg tablet Take 1 tablet (4 mg total) by mouth every 8 (eight) hours as needed for nausea or vomiting   • pantoprazole (PROTONIX) 40 mg tablet TAKE 1 TABLET BY MOUTH DAILY BEFORE BREAKFAST   • rosuvastatin (CRESTOR) 5 mg tablet TAKE 1 TABLET (5 MG TOTAL) BY MOUTH DAILY.   • traZODone (DESYREL)  "50 mg tablet TAKE 1 TABLET BY MOUTH DAILY AT BEDTIME   • [DISCONTINUED] tirzepatide (Zepbound) 5 mg/0.5 mL auto-injector Inject 0.5 mL (5 mg total) under the skin once a week   • meloxicam (MOBIC) 15 mg tablet Take 1 tablet (15 mg total) by mouth daily as needed for moderate pain (Patient not taking: Reported on 1/29/2024)     Allergies   Allergen Reactions   • Shellfish Allergy - Food Allergy Throat Swelling     Immunization History   Administered Date(s) Administered   • COVID-19 PFIZER VACCINE 0.3 ML IM 08/02/2021, 08/23/2021, 02/22/2022   • COVID-19 Pfizer vac (Riley-sucrose, gray cap) 12 yr+ IM 02/22/2022   • Fluzone Split Quad 0.25 mL 10/05/2017   • INFLUENZA 10/05/2017     Objective     /76 (BP Location: Left arm, Patient Position: Sitting, Cuff Size: Standard)   Pulse 69   Temp 97.8 °F (36.6 °C) (Tympanic)   Resp 16   Ht 5' 5\" (1.651 m)   Wt (!) 145 kg (319 lb 12.8 oz)   SpO2 98%   BMI 53.22 kg/m²     Physical Exam  Vitals and nursing note reviewed.   Constitutional:       Appearance: She is well-developed.   HENT:      Head: Normocephalic and atraumatic.   Eyes:      Pupils: Pupils are equal, round, and reactive to light.   Cardiovascular:      Rate and Rhythm: Normal rate and regular rhythm.      Heart sounds: Normal heart sounds.   Pulmonary:      Effort: Pulmonary effort is normal.      Breath sounds: Normal breath sounds.   Abdominal:      General: Bowel sounds are normal.      Palpations: Abdomen is soft.   Musculoskeletal:      Cervical back: Normal range of motion and neck supple.   Lymphadenopathy:      Cervical: No cervical adenopathy.   Skin:     General: Skin is warm.   Neurological:      Mental Status: She is alert and oriented to person, place, and time.         "

## 2024-08-27 ENCOUNTER — APPOINTMENT (OUTPATIENT)
Dept: LAB | Facility: IMAGING CENTER | Age: 44
End: 2024-08-27
Payer: COMMERCIAL

## 2024-08-27 DIAGNOSIS — R73.9 HYPERGLYCEMIA: ICD-10-CM

## 2024-08-27 DIAGNOSIS — R00.2 PALPITATION: ICD-10-CM

## 2024-08-27 DIAGNOSIS — E78.49 OTHER HYPERLIPIDEMIA: ICD-10-CM

## 2024-08-27 DIAGNOSIS — I10 ESSENTIAL HYPERTENSION: ICD-10-CM

## 2024-08-27 LAB
ALBUMIN SERPL BCG-MCNC: 4.2 G/DL (ref 3.5–5)
ALP SERPL-CCNC: 48 U/L (ref 34–104)
ALT SERPL W P-5'-P-CCNC: 24 U/L (ref 7–52)
ANION GAP SERPL CALCULATED.3IONS-SCNC: 7 MMOL/L (ref 4–13)
AST SERPL W P-5'-P-CCNC: 29 U/L (ref 13–39)
BASOPHILS # BLD AUTO: 0.06 THOUSANDS/ÂΜL (ref 0–0.1)
BASOPHILS NFR BLD AUTO: 1 % (ref 0–1)
BILIRUB SERPL-MCNC: 0.59 MG/DL (ref 0.2–1)
BUN SERPL-MCNC: 22 MG/DL (ref 5–25)
CALCIUM SERPL-MCNC: 9.7 MG/DL (ref 8.4–10.2)
CHLORIDE SERPL-SCNC: 101 MMOL/L (ref 96–108)
CHOLEST SERPL-MCNC: 118 MG/DL
CO2 SERPL-SCNC: 29 MMOL/L (ref 21–32)
CREAT SERPL-MCNC: 1.02 MG/DL (ref 0.6–1.3)
EOSINOPHIL # BLD AUTO: 0.1 THOUSAND/ÂΜL (ref 0–0.61)
EOSINOPHIL NFR BLD AUTO: 2 % (ref 0–6)
ERYTHROCYTE [DISTWIDTH] IN BLOOD BY AUTOMATED COUNT: 14.1 % (ref 11.6–15.1)
GFR SERPL CREATININE-BSD FRML MDRD: 67 ML/MIN/1.73SQ M
GLUCOSE P FAST SERPL-MCNC: 82 MG/DL (ref 65–99)
HCT VFR BLD AUTO: 42.3 % (ref 34.8–46.1)
HDLC SERPL-MCNC: 49 MG/DL
HGB BLD-MCNC: 13.7 G/DL (ref 11.5–15.4)
IMM GRANULOCYTES # BLD AUTO: 0.01 THOUSAND/UL (ref 0–0.2)
IMM GRANULOCYTES NFR BLD AUTO: 0 % (ref 0–2)
LDLC SERPL CALC-MCNC: 45 MG/DL (ref 0–100)
LYMPHOCYTES # BLD AUTO: 1.79 THOUSANDS/ÂΜL (ref 0.6–4.47)
LYMPHOCYTES NFR BLD AUTO: 34 % (ref 14–44)
MCH RBC QN AUTO: 28.1 PG (ref 26.8–34.3)
MCHC RBC AUTO-ENTMCNC: 32.4 G/DL (ref 31.4–37.4)
MCV RBC AUTO: 87 FL (ref 82–98)
MONOCYTES # BLD AUTO: 0.45 THOUSAND/ÂΜL (ref 0.17–1.22)
MONOCYTES NFR BLD AUTO: 9 % (ref 4–12)
NEUTROPHILS # BLD AUTO: 2.91 THOUSANDS/ÂΜL (ref 1.85–7.62)
NEUTS SEG NFR BLD AUTO: 54 % (ref 43–75)
NRBC BLD AUTO-RTO: 0 /100 WBCS
PLATELET # BLD AUTO: 354 THOUSANDS/UL (ref 149–390)
PMV BLD AUTO: 10.5 FL (ref 8.9–12.7)
POTASSIUM SERPL-SCNC: 4.4 MMOL/L (ref 3.5–5.3)
PROT SERPL-MCNC: 7 G/DL (ref 6.4–8.4)
RBC # BLD AUTO: 4.88 MILLION/UL (ref 3.81–5.12)
SODIUM SERPL-SCNC: 137 MMOL/L (ref 135–147)
TRIGL SERPL-MCNC: 122 MG/DL
TSH SERPL DL<=0.05 MIU/L-ACNC: 0.82 UIU/ML (ref 0.45–4.5)
WBC # BLD AUTO: 5.32 THOUSAND/UL (ref 4.31–10.16)

## 2024-08-27 PROCEDURE — 83036 HEMOGLOBIN GLYCOSYLATED A1C: CPT

## 2024-08-27 PROCEDURE — 80053 COMPREHEN METABOLIC PANEL: CPT

## 2024-08-27 PROCEDURE — 80061 LIPID PANEL: CPT

## 2024-08-27 PROCEDURE — 84443 ASSAY THYROID STIM HORMONE: CPT

## 2024-08-27 PROCEDURE — 85025 COMPLETE CBC W/AUTO DIFF WBC: CPT

## 2024-08-27 PROCEDURE — 36415 COLL VENOUS BLD VENIPUNCTURE: CPT

## 2024-08-28 LAB
EST. AVERAGE GLUCOSE BLD GHB EST-MCNC: 117 MG/DL
HBA1C MFR BLD: 5.7 %

## 2024-08-29 ENCOUNTER — TELEPHONE (OUTPATIENT)
Age: 44
End: 2024-08-29

## 2024-08-29 DIAGNOSIS — E66.01 CLASS 3 SEVERE OBESITY DUE TO EXCESS CALORIES WITH SERIOUS COMORBIDITY AND BODY MASS INDEX (BMI) OF 50.0 TO 59.9 IN ADULT (HCC): ICD-10-CM

## 2024-08-29 NOTE — TELEPHONE ENCOUNTER
Patient called to make sure that pre-auth has been started for zepbound 7.5mg dosage. Told her that it had been started and someone will call her when the pre-auth is complete.

## 2024-08-30 ENCOUNTER — OFFICE VISIT (OUTPATIENT)
Dept: URGENT CARE | Facility: MEDICAL CENTER | Age: 44
End: 2024-08-30
Payer: COMMERCIAL

## 2024-08-30 VITALS
OXYGEN SATURATION: 98 % | HEART RATE: 64 BPM | SYSTOLIC BLOOD PRESSURE: 111 MMHG | TEMPERATURE: 97.7 F | DIASTOLIC BLOOD PRESSURE: 57 MMHG | RESPIRATION RATE: 18 BRPM

## 2024-08-30 DIAGNOSIS — H65.91 MEE (MIDDLE EAR EFFUSION), RIGHT: Primary | ICD-10-CM

## 2024-08-30 PROCEDURE — 99213 OFFICE O/P EST LOW 20 MIN: CPT | Performed by: NURSE PRACTITIONER

## 2024-08-30 RX ORDER — TIRZEPATIDE 7.5 MG/.5ML
7.5 INJECTION, SOLUTION SUBCUTANEOUS WEEKLY
Qty: 2 ML | Refills: 0 | Status: SHIPPED | OUTPATIENT
Start: 2024-08-30

## 2024-08-30 RX ORDER — PREDNISONE 20 MG/1
40 TABLET ORAL DAILY
Qty: 10 TABLET | Refills: 0 | Status: SHIPPED | OUTPATIENT
Start: 2024-08-30 | End: 2024-09-04

## 2024-08-30 NOTE — PATIENT INSTRUCTIONS
Daily allergy medicine like Claritin, Zyrtec or Allegra  Saline nasal spray 1-2 times a day  Prednisone as directed  Follow up with PCP if not improving

## 2024-08-30 NOTE — TELEPHONE ENCOUNTER
PA for Zepbound 7.5mg/0.5 mL CANCELLED due to     [x]Approval on file-dates approved 05/18/2024 - 02/17/2025 for all doses  []Medication already on Formulary  []Brand Name Preferred  []Patient no longer covered by insurance    Patient advised by     []My Chart Message  []Phone call    Message sent to office clinical pool   Yes    Scanned into Media  Yes

## 2024-08-30 NOTE — PROGRESS NOTES
[facility]        NAME: Antonella Irving is a 44 y.o. female  : 1980    MRN: [id number]  DATE: 2024  TIME: 2:26 PM    Assessment and Plan   MARSHA (middle ear effusion), right [H65.91]  1. MARSHA (middle ear effusion), right  predniSONE 20 mg tablet        Patient in NAD and VSS upon exam. Discussed with patient exam findings, noted MARSHA to right ear. Discussed with patient abx not indicated at this time, will give short course of steroids. Discussed supportive care and return precautions.       Patient Instructions       Follow up with PCP in 3-5 days.  Proceed to  ER if symptoms worsen.    If tests have been performed at [facility], our office will contact you with results if changes need to be made to the care plan discussed with you at the visit.  [facility]    Chief Complaint     Chief Complaint   Patient presents with    Earache     Patient c/o right ear pain x 2 weeks.          History of Present Illness       Started: 2 weeks  Positive: right ear pain, congestion r/t allergies  Negative: drainage, changes to hearing, fevers  Treatment: daily nasal spray and allergy medication        Review of Systems   Review of Systems   HENT:  Positive for congestion and ear pain. Negative for ear discharge and hearing loss.    All other systems reviewed and are negative.        Current Medications       Current Outpatient Medications:     predniSONE 20 mg tablet, Take 2 tablets (40 mg total) by mouth daily for 5 days, Disp: 10 tablet, Rfl: 0    albuterol (Ventolin HFA) 90 mcg/act inhaler, Inhale 2 puffs every 6 (six) hours as needed for wheezing, Disp: 18 g, Rfl: 1    ergocalciferol (VITAMIN D2) 50,000 units, TAKE 1 CAPSULE BY MOUTH ONE TIME PER WEEK, Disp: 12 capsule, Rfl: 1    FLUoxetine (PROzac) 40 MG capsule, TAKE 1 CAPSULE BY MOUTH EVERY DAY, Disp: 90 capsule, Rfl: 1    lisinopril-hydrochlorothiazide (PRINZIDE,ZESTORETIC) 20-25 MG per tablet, TAKE 1  TABLET BY MOUTH EVERY DAY, Disp: 90 tablet, Rfl: 1    ondansetron (ZOFRAN) 4 mg tablet, Take 1 tablet (4 mg total) by mouth every 8 (eight) hours as needed for nausea or vomiting, Disp: 20 tablet, Rfl: 0    pantoprazole (PROTONIX) 40 mg tablet, TAKE 1 TABLET BY MOUTH DAILY BEFORE BREAKFAST, Disp: 90 tablet, Rfl: 1    rosuvastatin (CRESTOR) 5 mg tablet, TAKE 1 TABLET (5 MG TOTAL) BY MOUTH DAILY., Disp: 90 tablet, Rfl: 1    tirzepatide (Zepbound) 7.5 mg/0.5 mL auto-injector, Inject 0.5 mL (7.5 mg total) under the skin once a week, Disp: 2 mL, Rfl: 0    traZODone (DESYREL) 50 mg tablet, TAKE 1 TABLET BY MOUTH DAILY AT BEDTIME, Disp: 100 tablet, Rfl: 1    Current Allergies     Allergies as of 08/30/2024 - Reviewed 08/30/2024   Allergen Reaction Noted    Shellfish allergy - food allergy Throat Swelling 09/11/2019            The following portions of the patient's history were reviewed and updated as appropriate: allergies, current medications, past family history, past medical history, past social history, past surgical history and problem list.     Past Medical History:   Diagnosis Date    Asthma     Depression     Hyperlipemia     Hypertension     Obesity, morbid, BMI 50 or higher ([facility])     Papanicolaou smear of cervix with positive high risk human papilloma virus (HPV) test     12/2023 pap negative, + HPV type 16;  COLPO:    Sleep apnea        Past Surgical History:   Procedure Laterality Date    HERNIA REPAIR  1999    IR ASPIRATION ONLY  3/12/2024    [place] INJECTION PROCEDURE LYMPHANGIOGRAPHY  2/27/2024    Procedure: LYMPHOGRAPHY WITH INDOCYANINE GREEN (ICG);  Surgeon: Daria Nuñez MD;  [facility];  Service: Gynecology Oncology    ME LAPS TOTAL HYSTERECT 250 GM/< W/RMVL TUBE/OVARY N/A 2/27/2024    Procedure: HYSTERECTOMY LAPAROSCOPIC TOTAL (LTH) W/ ROBOTICS, BILATERAL SALPINGO-OOPHORECTOMY, LYMPH NODE DISSECTION;  Surgeon: Daria Nuñez MD;  Location[facility];  Service: Gynecology Oncology       Family  History   Problem Relation Age of Onset    Hypertension Mother     Diabetes Mother         Mellitus    Pancreatic cancer Father     Diabetes Father         Mellitus    Coronary artery disease Father     Cancer Father         Pancreatic    No Known Problems Sister     No Known Problems Sister     No Known Problems Sister     No Known Problems Sister     No Known Problems Brother     No Known Problems Brother     No Known Problems Brother     No Known Problems Maternal Grandmother     No Known Problems Maternal Grandfather     Breast cancer Paternal Grandmother     Cancer Paternal Grandmother         Breast    No Known Problems Paternal Grandfather     Cervical cancer Maternal Aunt     Cancer Maternal Aunt         cervical    Breast cancer Paternal Aunt 64    Diabetes Family         Mellitus    Colon cancer Neg Hx     Ovarian cancer Neg Hx     Uterine cancer Neg Hx          Medications have been verified.        Objective   /57   Pulse 64   Temp 97.7 °F (36.5 °C)   Resp 18   SpO2 98%   No LMP recorded.       Physical Exam     Physical Exam  Constitutional:       General: She is not in acute distress.     Appearance: Normal appearance. She is not ill-appearing.   HENT:      Head: Normocephalic and atraumatic.      Right Ear: Hearing, ear canal and external ear normal. A middle ear effusion is present. Tympanic membrane is not injected, erythematous or bulging.      Left Ear: Hearing, tympanic membrane, ear canal and external ear normal.   Cardiovascular:      Rate and Rhythm: Normal rate.   Pulmonary:      Effort: Pulmonary effort is normal.   Skin:     General: Skin is warm and dry.   Neurological:      Mental Status: She is alert.

## 2024-09-04 ENCOUNTER — HOSPITAL ENCOUNTER (OUTPATIENT)
Dept: NON INVASIVE DIAGNOSTICS | Facility: HOSPITAL | Age: 44
Discharge: HOME | End: 2024-09-04
Attending: FAMILY MEDICINE
Payer: COMMERCIAL

## 2024-09-04 DIAGNOSIS — R00.2 PALPITATION: ICD-10-CM

## 2024-09-04 PROCEDURE — 93225 XTRNL ECG REC<48 HRS REC: CPT

## 2024-09-04 PROCEDURE — 93226 XTRNL ECG REC<48 HR SCAN A/R: CPT

## 2024-09-09 PROCEDURE — 93227 XTRNL ECG REC<48 HR R&I: CPT | Performed by: INTERNAL MEDICINE

## 2024-09-12 ENCOUNTER — RA CDI HCC (OUTPATIENT)
Dept: OTHER | Facility: HOSPITAL | Age: 44
End: 2024-09-12

## 2024-09-12 NOTE — PROGRESS NOTES
HCC coding opportunities          Chart Reviewed number of suggestions sent to Provider: 1   J45.909    Patients Insurance        Commercial Insurance: [employer]

## 2024-09-14 DIAGNOSIS — E78.49 OTHER HYPERLIPIDEMIA: ICD-10-CM

## 2024-09-16 ENCOUNTER — TELEPHONE (OUTPATIENT)
Dept: FAMILY MEDICINE CLINIC | Facility: CLINIC | Age: 44
End: 2024-09-16

## 2024-09-16 RX ORDER — ROSUVASTATIN CALCIUM 5 MG/1
5 TABLET, COATED ORAL DAILY
Qty: 90 TABLET | Refills: 1 | Status: SHIPPED | OUTPATIENT
Start: 2024-09-16

## 2024-09-16 NOTE — TELEPHONE ENCOUNTER
----- Message from Ruddy Taylor MD sent at 9/15/2024 10:34 PM EDT -----  Holter monitor did not show any significant arrhythmia.

## 2024-09-16 NOTE — TELEPHONE ENCOUNTER
----- Message from Ruddy Taylor MD sent at 9/15/2024 10:33 PM EDT -----  A1c improved to 5 7.  All the rest of blood work came back stable.

## 2024-09-23 ENCOUNTER — OFFICE VISIT (OUTPATIENT)
Dept: FAMILY MEDICINE CLINIC | Facility: CLINIC | Age: 44
End: 2024-09-23
Payer: COMMERCIAL

## 2024-09-23 VITALS
OXYGEN SATURATION: 96 % | HEIGHT: 65 IN | SYSTOLIC BLOOD PRESSURE: 100 MMHG | BODY MASS INDEX: 48.82 KG/M2 | RESPIRATION RATE: 16 BRPM | HEART RATE: 68 BPM | TEMPERATURE: 97.9 F | DIASTOLIC BLOOD PRESSURE: 66 MMHG | WEIGHT: 293 LBS

## 2024-09-23 DIAGNOSIS — E66.813 CLASS 3 SEVERE OBESITY DUE TO EXCESS CALORIES WITH SERIOUS COMORBIDITY AND BODY MASS INDEX (BMI) OF 50.0 TO 59.9 IN ADULT (HCC): Primary | ICD-10-CM

## 2024-09-23 DIAGNOSIS — I10 ESSENTIAL HYPERTENSION: ICD-10-CM

## 2024-09-23 DIAGNOSIS — E66.01 CLASS 3 SEVERE OBESITY DUE TO EXCESS CALORIES WITH SERIOUS COMORBIDITY AND BODY MASS INDEX (BMI) OF 50.0 TO 59.9 IN ADULT (HCC): Primary | ICD-10-CM

## 2024-09-23 DIAGNOSIS — E78.49 OTHER HYPERLIPIDEMIA: ICD-10-CM

## 2024-09-23 PROCEDURE — 99213 OFFICE O/P EST LOW 20 MIN: CPT | Performed by: FAMILY MEDICINE

## 2024-09-23 RX ORDER — TIRZEPATIDE 10 MG/.5ML
10 INJECTION, SOLUTION SUBCUTANEOUS WEEKLY
Qty: 2 ML | Refills: 0 | Status: SHIPPED | OUTPATIENT
Start: 2024-09-23

## 2024-09-23 RX ORDER — TIRZEPATIDE 10 MG/.5ML
10 INJECTION, SOLUTION SUBCUTANEOUS WEEKLY
Qty: 2 ML | Refills: 0 | Status: SHIPPED | OUTPATIENT
Start: 2024-09-23 | End: 2024-09-23 | Stop reason: SDUPTHER

## 2024-09-23 NOTE — ASSESSMENT & PLAN NOTE
Pt advised to monitor BP at home. Continue with lisinopril 20-25 mg as prescribed, but pt should cut dose in half if she notices consistent low BP readings. Will continue to monitor and adjust as needed.

## 2024-09-23 NOTE — PROGRESS NOTES
Ambulatory Visit  Name: Antonella Irving      : 1980      MRN: [id number]  Encounter Provider: Ruddy Taylor MD  Encounter Date: 2024   Encounter department:  [facility] [address] [facility]ARE    Assessment & Plan  Class 3 severe obesity due to excess calories with serious comorbidity and body mass index (BMI) of 50.0 to 59.9 in adult (HCC)  Increased pt Zepbound dose to 10 mg. Will continue to monitor with follow-up in one month.   Orders:    tirzepatide (Zepbound) 10 mg/0.5 mL auto-injector; Inject 0.5 mL (10 mg total) under the skin once a week    Essential hypertension  Pt advised to monitor BP at home. Continue with lisinopril 20-25 mg as prescribed, but pt should cut dose in half if she notices consistent low BP readings. Will continue to monitor and adjust as needed.          Other hyperlipidemia  Recent blood work came back stable. Pt advised to continue with Crestor 5 mg as prescribed, as well as follow a low-fat diet. Will continue to monitor.               History of Present Illness     AC is a 43 yo female with a PMH of HTN, GERD, and hyperlipidemia, presenting to the office for a 1-month follow up after starting Zepbound 7.5 mg. She has no acute complaints today, and notes no issues with the dose increase. She denies chest pain, palpitations, SOB, and all other ROS were negative. Recent blood work came back stable.       Review of Systems   Constitutional:  Negative for chills and fever.   HENT:  Negative for ear pain and sore throat.    Eyes:  Negative for pain and visual disturbance.   Respiratory:  Negative for cough and shortness of breath.    Cardiovascular:  Negative for chest pain and palpitations.   Gastrointestinal:  Negative for abdominal pain and vomiting.   Genitourinary:  Negative for dysuria and hematuria.   Musculoskeletal:  Negative for arthralgias and back pain.   Skin:  Negative for color change and rash.   Neurological:  Negative for seizures and syncope.   All  other systems reviewed and are negative.    Past Medical History:   Diagnosis Date    Asthma     Depression     Hyperlipemia     Hypertension     Obesity, morbid, BMI 50 or higher (HCC)     Papanicolaou smear of cervix with positive high risk human papilloma virus (HPV) test     12/2023 pap negative, + HPV type 16;  COLPO:    Sleep apnea      Past Surgical History:   Procedure Laterality Date    HERNIA REPAIR  1999    IR ASPIRATION ONLY  3/12/2024    TX INJECTION PROCEDURE LYMPHANGIOGRAPHY  2/27/2024    Procedure: LYMPHOGRAPHY WITH INDOCYANINE GREEN (ICG);  Surgeon: Daria Nuñez MD;  Location: [facility];  Service: Gynecology Oncology    TX LAPS TOTAL HYSTERECT 250 GM/< W/RMVL TUBE/OVARY N/A 2/27/2024    Procedure: HYSTERECTOMY LAPAROSCOPIC TOTAL (LTH) W/ ROBOTICS, BILATERAL SALPINGO-OOPHORECTOMY, LYMPH NODE DISSECTION;  Surgeon: Daria Nuñez MD;  Location: [facility];  Service: Gynecology Oncology     Family History   Problem Relation Age of Onset    Hypertension Mother     Diabetes Mother         Mellitus    Pancreatic cancer Father     Diabetes Father         Mellitus    Coronary artery disease Father     Cancer Father         Pancreatic    No Known Problems Sister     No Known Problems Sister     No Known Problems Sister     No Known Problems Sister     No Known Problems Brother     No Known Problems Brother     No Known Problems Brother     No Known Problems Maternal Grandmother     No Known Problems Maternal Grandfather     Breast cancer Paternal Grandmother     Cancer Paternal Grandmother         Breast    No Known Problems Paternal Grandfather     Cervical cancer Maternal Aunt     Cancer Maternal Aunt         cervical    Breast cancer Paternal Aunt 64    Diabetes Family         Mellitus    Colon cancer Neg Hx     Ovarian cancer Neg Hx     Uterine cancer Neg Hx      Social History     Tobacco Use    Smoking status: Former     Current packs/day: 0.00     Types: Cigarettes     Quit date: 1/1/2005     Years  "since quittin.7    Smokeless tobacco: Never    Tobacco comments:     10 per day. No passive smoke exposure   Vaping Use    Vaping status: Never Used   Substance and Sexual Activity    Alcohol use: Not Currently    Drug use: Not Currently     Frequency: 1.0 times per week     Types: Marijuana     Comment: usage in highschool    Sexual activity: Not Currently     Partners: Male     Birth control/protection: Condom Male     Comment: last SA one year ago     Current Outpatient Medications on File Prior to Visit   Medication Sig    albuterol (Ventolin HFA) 90 mcg/act inhaler Inhale 2 puffs every 6 (six) hours as needed for wheezing    ergocalciferol (VITAMIN D2) 50,000 units TAKE 1 CAPSULE BY MOUTH ONE TIME PER WEEK    FLUoxetine (PROzac) 40 MG capsule TAKE 1 CAPSULE BY MOUTH EVERY DAY    lisinopril-hydrochlorothiazide (PRINZIDE,ZESTORETIC) 20-25 MG per tablet TAKE 1 TABLET BY MOUTH EVERY DAY    ondansetron (ZOFRAN) 4 mg tablet Take 1 tablet (4 mg total) by mouth every 8 (eight) hours as needed for nausea or vomiting    pantoprazole (PROTONIX) 40 mg tablet TAKE 1 TABLET BY MOUTH DAILY BEFORE BREAKFAST    rosuvastatin (CRESTOR) 5 mg tablet TAKE 1 TABLET (5 MG TOTAL) BY MOUTH DAILY.    traZODone (DESYREL) 50 mg tablet TAKE 1 TABLET BY MOUTH DAILY AT BEDTIME     Allergies   Allergen Reactions    Shellfish Allergy - Food Allergy Throat Swelling     Immunization History   Administered Date(s) Administered    COVID-19 PFIZER VACCINE 0.3 ML IM 2021, 2021, 2022    COVID-19 Pfizer vac (Riley-sucrose, gray cap) 12 yr+ IM 2022    Fluzone Split Quad 0.25 mL 10/05/2017    INFLUENZA 10/05/2017     Objective     /66 (BP Location: Left arm, Patient Position: Sitting, Cuff Size: Large)   Pulse 68   Temp 97.9 °F (36.6 °C) (Tympanic)   Resp 16   Ht 5' 5\" (1.651 m)   Wt (!) 143 kg (315 lb 6.4 oz)   SpO2 96%   BMI 52.49 kg/m²     Physical Exam  Vitals and nursing note reviewed.   Constitutional:       " General: She is not in acute distress.     Appearance: She is well-developed.   HENT:      Head: Normocephalic and atraumatic.   Eyes:      Conjunctiva/sclera: Conjunctivae normal.   Cardiovascular:      Rate and Rhythm: Normal rate and regular rhythm.      Heart sounds: No murmur heard.  Pulmonary:      Effort: Pulmonary effort is normal. No respiratory distress.      Breath sounds: Normal breath sounds.   Abdominal:      Palpations: Abdomen is soft.      Tenderness: There is no abdominal tenderness.   Musculoskeletal:         General: No swelling.      Cervical back: Neck supple.   Skin:     General: Skin is warm and dry.      Capillary Refill: Capillary refill takes less than 2 seconds.   Neurological:      Mental Status: She is alert.   Psychiatric:         Mood and Affect: Mood normal.

## 2024-09-23 NOTE — ASSESSMENT & PLAN NOTE
Increased pt Zepbound dose to 10 mg. Will continue to monitor with follow-up in one month.   Orders:    tirzepatide (Zepbound) 10 mg/0.5 mL auto-injector; Inject 0.5 mL (10 mg total) under the skin once a week

## 2024-09-23 NOTE — ASSESSMENT & PLAN NOTE
Recent blood work came back stable. Pt advised to continue with Crestor 5 mg as prescribed, as well as follow a low-fat diet. Will continue to monitor.

## 2024-10-08 NOTE — PROGRESS NOTES
Assessment/Plan:    Problem List Items Addressed This Visit       Class 3 severe obesity due to excess calories with serious comorbidity and body mass index (BMI) of 50.0 to 59.9 in adult (HCC)     Has lost 55lb on zepbound  Minimal complaints.            Encounter for follow-up surveillance of endometrial cancer - Primary     43yo with h/o Stage IA endometrial adenocarcinoma, figo grade 1 presents for surveillance visit.    Clinically JULIETA  Exam benign.    Continue with q3-6month surveillance visits              CHIEF COMPLAINT: follow up       Problem:   Cancer Staging   Endometrial carcinoma (HCC)  Staging form: Corpus Uteri - Carcinoma, AJCC 8th Edition  - Clinical stage from 1/29/2024: FIGO Stage I (cT1, cM0) - Signed by Faustino Millan MD on 1/29/2024        Previous therapy:  Oncology History   Endometrial carcinoma ([facility])   1/26/2024 Initial Diagnosis    Endometrial carcinoma ([facility])     1/29/2024 -  Cancer Staged    Staging form: Corpus Uteri - Carcinoma, AJCC 8th Edition  - Clinical stage from 1/29/2024: FIGO Stage I (cT1, cM0) - Signed by Faustino Millan MD on 1/29/2024  Histopathologic type: Endometrioid adenocarcinoma, NOS  Stage prefix: Initial diagnosis  Histologic grade (G): G1  Histologic grading system: 3 grade system       2/27/2024 Surgery    RA-TLH/BSO/SLND    pMMR  HER2 2+ IHC, FISH neg           Patient ID: Antonella Irving is a 44 y.o. female  43yo with h/o Stage IA endometrial adenocarcinoma, figo grade 1 presents for surveillance visit. Pt denies abdominal bloating/pain/cramping. No vaginal bleeding/discharge. No changes in BM/urination. She has been on zepboudn and lost 55lb. She has GERD related to medication but no other side effects. She is managing symptoms.             The following portions of the patient's history were reviewed and updated as appropriate: allergies, current medications, past family history, past medical history, past social history, past surgical history, and problem  "list.    Review of Systems   Constitutional: Negative.    HENT: Negative.     Eyes: Negative.    Respiratory: Negative.     Cardiovascular: Negative.    Gastrointestinal: Negative.    Endocrine: Negative.    Genitourinary: Negative.    Musculoskeletal: Negative.    Neurological: Negative.    Psychiatric/Behavioral: Negative.         Current Outpatient Medications   Medication Sig Dispense Refill    albuterol (Ventolin HFA) 90 mcg/act inhaler Inhale 2 puffs every 6 (six) hours as needed for wheezing 18 g 1    ergocalciferol (VITAMIN D2) 50,000 units TAKE 1 CAPSULE BY MOUTH ONE TIME PER WEEK 12 capsule 1    FLUoxetine (PROzac) 40 MG capsule TAKE 1 CAPSULE BY MOUTH EVERY DAY 90 capsule 1    lisinopril-hydrochlorothiazide (PRINZIDE,ZESTORETIC) 20-25 MG per tablet TAKE 1 TABLET BY MOUTH EVERY DAY 90 tablet 1    ondansetron (ZOFRAN) 4 mg tablet Take 1 tablet (4 mg total) by mouth every 8 (eight) hours as needed for nausea or vomiting 20 tablet 0    pantoprazole (PROTONIX) 40 mg tablet TAKE 1 TABLET BY MOUTH DAILY BEFORE BREAKFAST 90 tablet 1    rosuvastatin (CRESTOR) 5 mg tablet TAKE 1 TABLET (5 MG TOTAL) BY MOUTH DAILY. 90 tablet 1    tirzepatide (Zepbound) 10 mg/0.5 mL auto-injector Inject 0.5 mL (10 mg total) under the skin once a week 2 mL 0    traZODone (DESYREL) 50 mg tablet TAKE 1 TABLET BY MOUTH DAILY AT BEDTIME 100 tablet 1     No current facility-administered medications for this visit.           Objective:    Blood pressure 122/84, pulse 78, resp. rate 18, height 5' 5\" (1.651 m), weight (!) 142 kg (313 lb), last menstrual period 02/12/2024, SpO2 98%, not currently breastfeeding.  Body mass index is 52.09 kg/m².  Body surface area is 2.39 meters squared.    Physical Exam  Constitutional:       Appearance: She is obese.   HENT:      Head: Normocephalic and atraumatic.   Cardiovascular:      Rate and Rhythm: Normal rate and regular rhythm.   Pulmonary:      Effort: Pulmonary effort is normal.   Abdominal:      " "General: There is no distension.      Palpations: Abdomen is soft. There is no mass.   Genitourinary:     Comments: Chaperone present for exam.  The external female genitalia is normal. The bartholin's, uretheral and skenes glands are normal. The urethral meatus is normal (midline with no lesions). Anus without fissure or lesion. Speculum exam reveals a grossly normal vagina cuff. No masses, lesions,discharge or bleeding. No significant cystocele or rectocele noted. Bimanual exam notes a surgical absent cervix, uterus and adnexal structures. No masses or fullness. Bladder is without fullness, mass or tenderness.    Musculoskeletal:         General: Normal range of motion.      Cervical back: Normal range of motion.   Skin:     General: Skin is warm and dry.   Neurological:      Mental Status: She is alert and oriented to person, place, and time.           Lab Results   Component Value Date    K 4.4 08/27/2024     08/27/2024    CO2 29 08/27/2024    BUN 22 08/27/2024    CREATININE 1.02 08/27/2024    GLUF 82 08/27/2024    CALCIUM 9.7 08/27/2024    CORRECTEDCA 9.5 03/15/2024    AST 29 08/27/2024    ALT 24 08/27/2024    ALKPHOS 48 08/27/2024    EGFR 67 08/27/2024     Lab Results   Component Value Date    WBC 5.32 08/27/2024    HGB 13.7 08/27/2024    HCT 42.3 08/27/2024    MCV 87 08/27/2024     08/27/2024     Lab Results   Component Value Date    NEUTROABS 2.91 08/27/2024        Trend:  No results found for: \"\"     "

## 2024-10-08 NOTE — ASSESSMENT & PLAN NOTE
45yo with h/o Stage IA endometrial adenocarcinoma, figo grade 1 presents for surveillance visit.    Clinically JULIETA  Exam benign.    Continue with q3-6month surveillance visits

## 2024-10-14 ENCOUNTER — OFFICE VISIT (OUTPATIENT)
Dept: GYNECOLOGIC ONCOLOGY | Facility: CLINIC | Age: 44
End: 2024-10-14
Payer: COMMERCIAL

## 2024-10-14 VITALS
BODY MASS INDEX: 48.82 KG/M2 | RESPIRATION RATE: 18 BRPM | HEART RATE: 78 BPM | WEIGHT: 293 LBS | DIASTOLIC BLOOD PRESSURE: 84 MMHG | SYSTOLIC BLOOD PRESSURE: 122 MMHG | HEIGHT: 65 IN | OXYGEN SATURATION: 98 %

## 2024-10-14 DIAGNOSIS — Z08 ENCOUNTER FOR FOLLOW-UP SURVEILLANCE OF ENDOMETRIAL CANCER: Primary | ICD-10-CM

## 2024-10-14 DIAGNOSIS — E66.813 CLASS 3 SEVERE OBESITY DUE TO EXCESS CALORIES WITH SERIOUS COMORBIDITY AND BODY MASS INDEX (BMI) OF 50.0 TO 59.9 IN ADULT (HCC): ICD-10-CM

## 2024-10-14 DIAGNOSIS — Z85.42 ENCOUNTER FOR FOLLOW-UP SURVEILLANCE OF ENDOMETRIAL CANCER: Primary | ICD-10-CM

## 2024-10-14 DIAGNOSIS — E66.01 CLASS 3 SEVERE OBESITY DUE TO EXCESS CALORIES WITH SERIOUS COMORBIDITY AND BODY MASS INDEX (BMI) OF 50.0 TO 59.9 IN ADULT (HCC): ICD-10-CM

## 2024-10-14 PROCEDURE — 99459 PELVIC EXAMINATION: CPT | Performed by: OBSTETRICS & GYNECOLOGY

## 2024-10-14 PROCEDURE — 99213 OFFICE O/P EST LOW 20 MIN: CPT | Performed by: OBSTETRICS & GYNECOLOGY

## 2024-10-18 DIAGNOSIS — E66.813 CLASS 3 SEVERE OBESITY DUE TO EXCESS CALORIES WITH SERIOUS COMORBIDITY AND BODY MASS INDEX (BMI) OF 50.0 TO 59.9 IN ADULT (HCC): ICD-10-CM

## 2024-10-18 DIAGNOSIS — E66.01 CLASS 3 SEVERE OBESITY DUE TO EXCESS CALORIES WITH SERIOUS COMORBIDITY AND BODY MASS INDEX (BMI) OF 50.0 TO 59.9 IN ADULT (HCC): ICD-10-CM

## 2024-10-18 RX ORDER — TIRZEPATIDE 10 MG/.5ML
10 INJECTION, SOLUTION SUBCUTANEOUS WEEKLY
Qty: 2 ML | Refills: 0 | Status: SHIPPED | OUTPATIENT
Start: 2024-10-18 | End: 2024-10-22

## 2024-10-22 ENCOUNTER — OFFICE VISIT (OUTPATIENT)
Dept: FAMILY MEDICINE CLINIC | Facility: CLINIC | Age: 44
End: 2024-10-22
Payer: COMMERCIAL

## 2024-10-22 ENCOUNTER — TELEPHONE (OUTPATIENT)
Age: 44
End: 2024-10-22

## 2024-10-22 VITALS
OXYGEN SATURATION: 98 % | BODY MASS INDEX: 48.82 KG/M2 | HEIGHT: 65 IN | WEIGHT: 293 LBS | RESPIRATION RATE: 16 BRPM | TEMPERATURE: 96.3 F | SYSTOLIC BLOOD PRESSURE: 110 MMHG | HEART RATE: 89 BPM | DIASTOLIC BLOOD PRESSURE: 72 MMHG

## 2024-10-22 DIAGNOSIS — C54.1 ENDOMETRIAL CARCINOMA (HCC): ICD-10-CM

## 2024-10-22 DIAGNOSIS — E66.813 CLASS 3 SEVERE OBESITY DUE TO EXCESS CALORIES WITH SERIOUS COMORBIDITY AND BODY MASS INDEX (BMI) OF 50.0 TO 59.9 IN ADULT (HCC): Primary | ICD-10-CM

## 2024-10-22 DIAGNOSIS — E66.01 CLASS 3 SEVERE OBESITY DUE TO EXCESS CALORIES WITH SERIOUS COMORBIDITY AND BODY MASS INDEX (BMI) OF 50.0 TO 59.9 IN ADULT (HCC): Primary | ICD-10-CM

## 2024-10-22 DIAGNOSIS — I10 ESSENTIAL HYPERTENSION: ICD-10-CM

## 2024-10-22 PROCEDURE — 99214 OFFICE O/P EST MOD 30 MIN: CPT | Performed by: FAMILY MEDICINE

## 2024-10-22 RX ORDER — TIRZEPATIDE 12.5 MG/.5ML
12.5 INJECTION, SOLUTION SUBCUTANEOUS WEEKLY
Qty: 2 ML | Refills: 0 | Status: SHIPPED | OUTPATIENT
Start: 2024-10-22

## 2024-10-22 NOTE — TELEPHONE ENCOUNTER
Pt called and was seen today.  She was told that her zepbound was going to be increased.  She received a notice stating Mounjaro was being sent in.    Pt wants to confirm this is correct, as she has only had zepbound name.    Attempted to warm transfer, unavailable.    Please advise

## 2024-10-22 NOTE — PROGRESS NOTES
Ambulatory Visit  Name: Antonella Ivring      : 1980      MRN: [id number]  Encounter Provider: uRddy Taylor MD  Encounter Date: 10/22/2024   Encounter department: [facility] [facility]    Assessment & Plan  Class 3 severe obesity due to excess calories with serious comorbidity and body mass index (BMI) of 50.0 to 59.9 in adult (HCC)  Improving.  I am going to increase Zepbound to 12.5 mg weekly.  Discussed with possible side effect.  Discussed about low-carb diet and regular exercise.  Come back in 6 weeks for follow-up.    Orders:    tirzepatide (Mounjaro) 12.5 MG/0.5ML; Inject 0.5 mL (12.5 mg total) under the skin every 7 days    Essential hypertension  Blood pressure well-controlled.  Discussed with patient to try to hold off on taking the medicine and just monitor her blood pressure.  She was told to go back on the medication if her blood pressure goes above 140/90.  Come back in 6 weeks.    Orders:    CBC and differential; Future    Comprehensive metabolic panel; Future    Lipid Panel with Direct LDL reflex; Future    TSH, 3rd generation with Free T4 reflex; Future    Endometrial carcinoma (HCC)  Continue to follow-up with oncology.              History of Present Illness     She is here today for follow-up multiple medical problems.  She has been taking her Zepbound 10 mg weekly.  She lost 5 pounds since her last visit.  Denies any side effect.  She has been watching her blood pressure and has been well-controlled running in the 100s systolic.  She cut her lisinopril hydrochlorothiazide tablet in half.  He denies any other complaint.      Review of Systems   Constitutional:  Negative for chills and fever.   HENT:  Negative for trouble swallowing.    Eyes:  Negative for visual disturbance.   Respiratory:  Negative for cough and shortness of breath.    Cardiovascular:  Negative for chest pain, palpitations and leg swelling.   Gastrointestinal:  Negative for abdominal pain,  constipation and diarrhea.   Endocrine: Negative for cold intolerance and heat intolerance.   Genitourinary:  Negative for difficulty urinating and dysuria.   Musculoskeletal:  Negative for gait problem.   Skin:  Negative for rash.   Neurological:  Negative for dizziness, tremors, seizures and headaches.   Hematological:  Negative for adenopathy.   Psychiatric/Behavioral:  Negative for behavioral problems.      Past Medical History:   Diagnosis Date    Asthma     Depression     Hyperlipemia     Hypertension     Obesity, morbid, BMI 50 or higher (HCC)     Papanicolaou smear of cervix with positive high risk human papilloma virus (HPV) test     12/2023 pap negative, + HPV type 16;  COLPO:    Sleep apnea      Past Surgical History:   Procedure Laterality Date    HERNIA REPAIR  1999    IR ASPIRATION ONLY  3/12/2024    DE INJECTION PROCEDURE LYMPHANGIOGRAPHY  2/27/2024    Procedure: LYMPHOGRAPHY WITH INDOCYANINE GREEN (ICG);  Surgeon: Daria Nuñez MD;  [facility];  Service: Gynecology Oncology    DE LAPS TOTAL HYSTERECT 250 GM/< W/RMVL TUBE/OVARY N/A 2/27/2024    Procedure: HYSTERECTOMY LAPAROSCOPIC TOTAL (LTH) W/ ROBOTICS, BILATERAL SALPINGO-OOPHORECTOMY, LYMPH NODE DISSECTION;  Surgeon: Daria Nuñez MD;  Location: [facility];  Service: Gynecology Oncology     Family History   Problem Relation Age of Onset    Hypertension Mother     Diabetes Mother         Mellitus    Pancreatic cancer Father     Diabetes Father         Mellitus    Coronary artery disease Father     Cancer Father         Pancreatic    No Known Problems Sister     No Known Problems Sister     No Known Problems Sister     No Known Problems Sister     No Known Problems Brother     No Known Problems Brother     No Known Problems Brother     No Known Problems Maternal Grandmother     No Known Problems Maternal Grandfather     Breast cancer Paternal Grandmother     Cancer Paternal Grandmother         Breast    No Known Problems Paternal  Grandfather     Cervical cancer Maternal Aunt     Cancer Maternal Aunt         cervical    Breast cancer Paternal Aunt 64    Diabetes Family         Mellitus    Colon cancer Neg Hx     Ovarian cancer Neg Hx     Uterine cancer Neg Hx      Social History     Tobacco Use    Smoking status: Former     Current packs/day: 0.00     Types: Cigarettes     Quit date: 2005     Years since quittin.8    Smokeless tobacco: Never    Tobacco comments:     10 per day. No passive smoke exposure   Vaping Use    Vaping status: Never Used   Substance and Sexual Activity    Alcohol use: Not Currently    Drug use: Not Currently     Frequency: 1.0 times per week     Types: Marijuana     Comment: usage in [place]    Sexual activity: Not Currently     Partners: Male     Birth control/protection: Condom Male     Comment: last SA one year ago     Current Outpatient Medications on File Prior to Visit   Medication Sig    albuterol (Ventolin HFA) 90 mcg/act inhaler Inhale 2 puffs every 6 (six) hours as needed for wheezing    ergocalciferol (VITAMIN D2) 50,000 units TAKE 1 CAPSULE BY MOUTH ONE TIME PER WEEK    FLUoxetine (PROzac) 40 MG capsule TAKE 1 CAPSULE BY MOUTH EVERY DAY    lisinopril-hydrochlorothiazide (PRINZIDE,ZESTORETIC) 20-25 MG per tablet TAKE 1 TABLET BY MOUTH EVERY DAY    ondansetron (ZOFRAN) 4 mg tablet Take 1 tablet (4 mg total) by mouth every 8 (eight) hours as needed for nausea or vomiting    pantoprazole (PROTONIX) 40 mg tablet TAKE 1 TABLET BY MOUTH DAILY BEFORE BREAKFAST    rosuvastatin (CRESTOR) 5 mg tablet TAKE 1 TABLET (5 MG TOTAL) BY MOUTH DAILY.    Zepbound 10 MG/0.5ML auto-injector INJECT 10MG SUBCUTANEOUSLY (UNDER THE SKIN) ONCE WEEKLY    traZODone (DESYREL) 50 mg tablet TAKE 1 TABLET BY MOUTH DAILY AT BEDTIME (Patient not taking: Reported on 10/22/2024)     Allergies   Allergen Reactions    Shellfish Allergy - Food Allergy Throat Swelling     Immunization History   Administered Date(s) Administered     "COVID-19 PFIZER VACCINE 0.3 ML IM 08/02/2021, 08/23/2021, 02/22/2022    COVID-19 Pfizer vac (Riley-sucrose, gray cap) 12 yr+ IM 02/22/2022    Fluzone Split Quad 0.25 mL 10/05/2017    INFLUENZA 10/05/2017     Objective     /72 (BP Location: Left arm, Patient Position: Sitting, Cuff Size: Large)   Pulse 89   Temp (!) 96.3 °F (35.7 °C) (Tympanic)   Resp 16   Ht 5' 5\" (1.651 m)   Wt (!) 141 kg (310 lb)   LMP 02/12/2024   SpO2 98%   BMI 51.59 kg/m²     Physical Exam  Vitals and nursing note reviewed.   Constitutional:       Appearance: She is well-developed.   HENT:      Head: Normocephalic and atraumatic.   Eyes:      Pupils: Pupils are equal, round, and reactive to light.   Cardiovascular:      Rate and Rhythm: Normal rate and regular rhythm.      Heart sounds: Normal heart sounds.   Pulmonary:      Effort: Pulmonary effort is normal.      Breath sounds: Normal breath sounds.   Abdominal:      General: Bowel sounds are normal.      Palpations: Abdomen is soft.   Musculoskeletal:      Cervical back: Normal range of motion and neck supple.   Lymphadenopathy:      Cervical: No cervical adenopathy.   Skin:     General: Skin is warm.   Neurological:      Mental Status: She is alert and oriented to person, place, and time.         "

## 2024-10-22 NOTE — ASSESSMENT & PLAN NOTE
Improving.  I am going to increase Zepbound to 12.5 mg weekly.  Discussed with possible side effect.  Discussed about low-carb diet and regular exercise.  Come back in 6 weeks for follow-up.    Orders:    tirzepatide (Mounjaro) 12.5 MG/0.5ML; Inject 0.5 mL (12.5 mg total) under the skin every 7 days

## 2024-10-22 NOTE — ASSESSMENT & PLAN NOTE
Blood pressure well-controlled.  Discussed with patient to try to hold off on taking the medicine and just monitor her blood pressure.  She was told to go back on the medication if her blood pressure goes above 140/90.  Come back in 6 weeks.    Orders:    CBC and differential; Future    Comprehensive metabolic panel; Future    Lipid Panel with Direct LDL reflex; Future    TSH, 3rd generation with Free T4 reflex; Future

## 2024-10-22 NOTE — TELEPHONE ENCOUNTER
I sent a prescription for Zepbound 12.5 mg weekly to [facility] pharmacy.  I canceled the Mounjaro.

## 2024-10-22 NOTE — TELEPHONE ENCOUNTER
Please advise.   Pt was in office today and her zepbound was suppose to be increased to 12.5mg. Mounjaro 12.5mg was sent to the pharmacy. Pt would like to know if she is suppose to be on the zepbound or Mounjaro.

## 2024-11-06 ENCOUNTER — OFFICE VISIT (OUTPATIENT)
Dept: FAMILY MEDICINE CLINIC | Facility: CLINIC | Age: 44
End: 2024-11-06
Payer: COMMERCIAL

## 2024-11-06 VITALS
DIASTOLIC BLOOD PRESSURE: 72 MMHG | HEIGHT: 65 IN | WEIGHT: 293 LBS | OXYGEN SATURATION: 97 % | HEART RATE: 84 BPM | BODY MASS INDEX: 48.82 KG/M2 | SYSTOLIC BLOOD PRESSURE: 112 MMHG | TEMPERATURE: 97.7 F | RESPIRATION RATE: 16 BRPM

## 2024-11-06 DIAGNOSIS — J01.00 ACUTE NON-RECURRENT MAXILLARY SINUSITIS: Primary | ICD-10-CM

## 2024-11-06 DIAGNOSIS — I10 ESSENTIAL HYPERTENSION: ICD-10-CM

## 2024-11-06 PROCEDURE — 99213 OFFICE O/P EST LOW 20 MIN: CPT | Performed by: NURSE PRACTITIONER

## 2024-11-06 RX ORDER — AZITHROMYCIN 250 MG/1
TABLET, FILM COATED ORAL
Qty: 6 TABLET | Refills: 0 | Status: SHIPPED | OUTPATIENT
Start: 2024-11-06 | End: 2024-11-10

## 2024-11-06 NOTE — ASSESSMENT & PLAN NOTE
- She was told to hold her blood pressure medication last visit. BP is well controlled in the office today.  - Will continue to monitor at next follow up.

## 2024-11-06 NOTE — PROGRESS NOTES
Ambulatory Visit  Name: Antonella Irving      : 1980      MRN: [id number]  Encounter Provider: MICHAELLE Hodges  Encounter Date: 2024   Encounter department: [facility]    Assessment & Plan  Acute non-recurrent maxillary sinusitis  - Prescription sent for Z-wesly. Advised of side effects.  - Recommend Flonase.   - Increase oral hydration and use humidifier.  - Contact office if symptoms do not improve.   Orders:    azithromycin (Zithromax) 250 mg tablet; Take 2 tablets (500 mg total) by mouth daily for 1 day, THEN 1 tablet (250 mg total) daily for 4 days.    Essential hypertension  - She was told to hold her blood pressure medication last visit. BP is well controlled in the office today.  - Will continue to monitor at next follow up.               History of Present Illness     Patient presents to office today with complaints of congestion, headaches, and sinus pain/pressure. Symptoms have been ongoing for the past 3 weeks. Reports sick contacts at home. Has been taking OTC allergy medication with no improvement. She denies any other concerns or complaints today.         Review of Systems   Constitutional:  Negative for fatigue and fever.   HENT:  Positive for congestion, sinus pressure and sinus pain. Negative for trouble swallowing.    Eyes:  Negative for visual disturbance.   Respiratory:  Negative for cough and shortness of breath.    Cardiovascular:  Negative for chest pain and palpitations.   Gastrointestinal:  Negative for abdominal pain and blood in stool.   Endocrine: Negative for cold intolerance and heat intolerance.   Genitourinary:  Negative for difficulty urinating and dysuria.   Musculoskeletal:  Negative for gait problem.   Skin:  Negative for rash.   Neurological:  Positive for headaches. Negative for dizziness and syncope.   Hematological:  Negative for adenopathy.   Psychiatric/Behavioral:  Negative for behavioral problems.      Past Medical History:    Diagnosis Date    Asthma     Depression     Hyperlipemia     Hypertension     Obesity, morbid, BMI 50 or higher (HCC)     Papanicolaou smear of cervix with positive high risk human papilloma virus (HPV) test     2023 pap negative, + HPV type 16;  COLPO:    Sleep apnea      Past Surgical History:   Procedure Laterality Date    HERNIA REPAIR      IR ASPIRATION ONLY  3/12/2024    WI INJECTION PROCEDURE LYMPHANGIOGRAPHY  2024    Procedure: LYMPHOGRAPHY WITH INDOCYANINE GREEN (ICG);  Surgeon: Daria Nuñez MD;  Location: [facility];  Service: Gynecology Oncology    WI LAPS TOTAL HYSTERECT 250 GM/< W/RMVL TUBE/OVARY N/A 2024    Procedure: HYSTERECTOMY LAPAROSCOPIC TOTAL (LTH) W/ ROBOTICS, BILATERAL SALPINGO-OOPHORECTOMY, LYMPH NODE DISSECTION;  Surgeon: Daria Nuñez MD;  Location: [facility];  Service: Gynecology Oncology     Family History   Problem Relation Age of Onset    Hypertension Mother     Diabetes Mother         Mellitus    Pancreatic cancer Father     Diabetes Father         Mellitus    Coronary artery disease Father     Cancer Father         Pancreatic    No Known Problems Sister     No Known Problems Sister     No Known Problems Sister     No Known Problems Sister     No Known Problems Brother     No Known Problems Brother     No Known Problems Brother     No Known Problems Maternal Grandmother     No Known Problems Maternal Grandfather     Breast cancer Paternal Grandmother     Cancer Paternal Grandmother         Breast    No Known Problems Paternal Grandfather     Cervical cancer Maternal Aunt     Cancer Maternal Aunt         cervical    Breast cancer Paternal Aunt 64    Diabetes Family         Mellitus    Colon cancer Neg Hx     Ovarian cancer Neg Hx     Uterine cancer Neg Hx      Social History     Tobacco Use    Smoking status: Former     Current packs/day: 0.00     Types: Cigarettes     Quit date: 2005     Years since quittin.8    Smokeless tobacco: Never    Tobacco  "comments:     10 per day. No passive smoke exposure   Vaping Use    Vaping status: Never Used   Substance and Sexual Activity    Alcohol use: Not Currently    Drug use: Not Currently     Frequency: 1.0 times per week     Types: Marijuana     Comment: usage in highschool    Sexual activity: Not Currently     Partners: Male     Birth control/protection: Condom Male     Comment: last SA one year ago     Current Outpatient Medications on File Prior to Visit   Medication Sig    albuterol (Ventolin HFA) 90 mcg/act inhaler Inhale 2 puffs every 6 (six) hours as needed for wheezing    ergocalciferol (VITAMIN D2) 50,000 units TAKE 1 CAPSULE BY MOUTH ONE TIME PER WEEK    FLUoxetine (PROzac) 40 MG capsule TAKE 1 CAPSULE BY MOUTH EVERY DAY    lisinopril-hydrochlorothiazide (PRINZIDE,ZESTORETIC) 20-25 MG per tablet TAKE 1 TABLET BY MOUTH EVERY DAY    ondansetron (ZOFRAN) 4 mg tablet Take 1 tablet (4 mg total) by mouth every 8 (eight) hours as needed for nausea or vomiting    pantoprazole (PROTONIX) 40 mg tablet TAKE 1 TABLET BY MOUTH DAILY BEFORE BREAKFAST    rosuvastatin (CRESTOR) 5 mg tablet TAKE 1 TABLET (5 MG TOTAL) BY MOUTH DAILY.    tirzepatide (Zepbound) 12.5 mg/0.5 mL auto-injector Inject 0.5 mL (12.5 mg total) under the skin once a week    traZODone (DESYREL) 50 mg tablet TAKE 1 TABLET BY MOUTH DAILY AT BEDTIME (Patient not taking: Reported on 10/22/2024)     Allergies   Allergen Reactions    Shellfish Allergy - Food Allergy Throat Swelling     Immunization History   Administered Date(s) Administered    COVID-19 PFIZER VACCINE 0.3 ML IM 08/02/2021, 08/23/2021, 02/22/2022    COVID-19 Pfizer vac (Riley-sucrose, gray cap) 12 yr+ IM 02/22/2022    Fluzone Split Quad 0.25 mL 10/05/2017    INFLUENZA 10/05/2017     Objective     /72 (BP Location: Left arm, Patient Position: Sitting, Cuff Size: Large)   Pulse 84   Temp 97.7 °F (36.5 °C) (Tympanic)   Resp 16   Ht 5' 5\" (1.651 m)   Wt (!) 140 kg (309 lb 9.6 oz)   LMP " 02/12/2024   SpO2 97%   BMI 51.52 kg/m²     Physical Exam  Vitals and nursing note reviewed.   Constitutional:       General: She is not in acute distress.     Appearance: Normal appearance. She is well-developed. She is ill-appearing.   HENT:      Head: Normocephalic and atraumatic.      Right Ear: Tympanic membrane, ear canal and external ear normal.      Left Ear: Tympanic membrane, ear canal and external ear normal.      Nose: Congestion present.      Mouth/Throat:      Mouth: Mucous membranes are moist.   Eyes:      Conjunctiva/sclera: Conjunctivae normal.   Cardiovascular:      Rate and Rhythm: Normal rate and regular rhythm.      Heart sounds: Normal heart sounds.   Pulmonary:      Effort: Pulmonary effort is normal.      Breath sounds: Normal breath sounds.   Musculoskeletal:         General: Normal range of motion.      Cervical back: Normal range of motion.   Skin:     General: Skin is warm and dry.   Neurological:      Mental Status: She is alert and oriented to person, place, and time.   Psychiatric:         Mood and Affect: Mood normal.         Behavior: Behavior normal.

## 2024-11-10 DIAGNOSIS — E66.813 CLASS 3 SEVERE OBESITY DUE TO EXCESS CALORIES WITH SERIOUS COMORBIDITY AND BODY MASS INDEX (BMI) OF 50.0 TO 59.9 IN ADULT (HCC): ICD-10-CM

## 2024-11-10 DIAGNOSIS — E66.01 CLASS 3 SEVERE OBESITY DUE TO EXCESS CALORIES WITH SERIOUS COMORBIDITY AND BODY MASS INDEX (BMI) OF 50.0 TO 59.9 IN ADULT (HCC): ICD-10-CM

## 2024-11-11 RX ORDER — TIRZEPATIDE 12.5 MG/.5ML
INJECTION, SOLUTION SUBCUTANEOUS
Qty: 2 ML | Refills: 0 | Status: SHIPPED | OUTPATIENT
Start: 2024-11-11

## 2024-11-26 ENCOUNTER — RA CDI HCC (OUTPATIENT)
Dept: OTHER | Facility: HOSPITAL | Age: 44
End: 2024-11-26

## 2024-12-03 ENCOUNTER — OFFICE VISIT (OUTPATIENT)
Dept: FAMILY MEDICINE CLINIC | Facility: CLINIC | Age: 44
End: 2024-12-03
Payer: COMMERCIAL

## 2024-12-03 VITALS
OXYGEN SATURATION: 99 % | WEIGHT: 293 LBS | BODY MASS INDEX: 48.82 KG/M2 | RESPIRATION RATE: 16 BRPM | HEART RATE: 63 BPM | SYSTOLIC BLOOD PRESSURE: 122 MMHG | HEIGHT: 65 IN | TEMPERATURE: 96 F | DIASTOLIC BLOOD PRESSURE: 72 MMHG

## 2024-12-03 DIAGNOSIS — F33.0 MILD EPISODE OF RECURRENT MAJOR DEPRESSIVE DISORDER (HCC): ICD-10-CM

## 2024-12-03 DIAGNOSIS — I10 ESSENTIAL HYPERTENSION: ICD-10-CM

## 2024-12-03 DIAGNOSIS — R73.9 HYPERGLYCEMIA: ICD-10-CM

## 2024-12-03 DIAGNOSIS — E78.49 OTHER HYPERLIPIDEMIA: ICD-10-CM

## 2024-12-03 DIAGNOSIS — J45.20 MILD INTERMITTENT ASTHMA WITHOUT COMPLICATION: ICD-10-CM

## 2024-12-03 DIAGNOSIS — E66.01 CLASS 3 SEVERE OBESITY DUE TO EXCESS CALORIES WITH SERIOUS COMORBIDITY AND BODY MASS INDEX (BMI) OF 50.0 TO 59.9 IN ADULT (HCC): Primary | ICD-10-CM

## 2024-12-03 DIAGNOSIS — C54.1 ENDOMETRIAL CARCINOMA (HCC): ICD-10-CM

## 2024-12-03 DIAGNOSIS — E66.813 CLASS 3 SEVERE OBESITY DUE TO EXCESS CALORIES WITH SERIOUS COMORBIDITY AND BODY MASS INDEX (BMI) OF 50.0 TO 59.9 IN ADULT (HCC): Primary | ICD-10-CM

## 2024-12-03 PROBLEM — F33.41 RECURRENT MAJOR DEPRESSIVE DISORDER, IN PARTIAL REMISSION (HCC): Status: RESOLVED | Noted: 2019-02-05 | Resolved: 2024-12-03

## 2024-12-03 PROBLEM — F33.9 DEPRESSION, RECURRENT (HCC): Status: RESOLVED | Noted: 2020-02-10 | Resolved: 2024-12-03

## 2024-12-03 PROCEDURE — 99214 OFFICE O/P EST MOD 30 MIN: CPT | Performed by: FAMILY MEDICINE

## 2024-12-03 RX ORDER — TIRZEPATIDE 15 MG/.5ML
15 INJECTION, SOLUTION SUBCUTANEOUS WEEKLY
Qty: 2 ML | Refills: 3 | Status: SHIPPED | OUTPATIENT
Start: 2024-12-03

## 2024-12-03 NOTE — PROGRESS NOTES
Name: Antonella Irving      : 1980      MRN: [id number]  Encounter Provider: Ruddy Taylor MD  Encounter Date: 12/3/2024   Encounter department: [facility]  :  Assessment & Plan  Class 3 severe obesity due to excess calories with serious comorbidity and body mass index (BMI) of 50.0 to 59.9 in adult (HCC)  Increased to Tirzepatide 15 mg weekly dose.   Dicussed low-carb diet and regular exercise.  We will continue to monitor.  Follow up 3 months.  Essential hypertension  Blood pressure well controlled without medication.   Continue to monitor blood pressure at home. Patient may continue to take a half tablet of Ptinzide-Zestoretic 20-25 mg as needed for elevated blood pressure.   We will continue to monitor.    Orders:    CBC and differential; Future    Comprehensive metabolic panel; Future    Lipid Panel with Direct LDL reflex; Future    TSH, 3rd generation with Free T4 reflex; Future    Other hyperlipidemia  Continue rosuvastatin 5 mg tablets PO daily.   We will continue to monitor.  Hyperglycemia  Discussed low-carb diet and regular exercise.   Will continue to monitor with A1c and fasting glucose.    Orders:    Hemoglobin A1C; Future    Mild intermittent asthma without complication    Stable.  Continue same.  Will continue to monitor.         Endometrial carcinoma (HCC)  Continue to follow-up with oncology.               Mild episode of recurrent major depressive disorder (HCC)  - Well controlled on Prozac 40 mg daily. Continue same.   - Will continue to monitor.                History of Present Illness     Antonella is a 44 year old female presenting to the office for the management for multiple medical problems. She is taking her medication as directed and denies any side effects. She started tirzepatide 12.5 mg dose injections 3 weeks ago and has noticed a 1lb weight loss. She continues to monitor her blood pressure at home daily and notes it is usually 110-120/70. If her  "diastolic blood pressure is in the 80's she takes half of Prinzide-Zestoretic 20-25 mg tablet. She took the medication 5 times last month. Since her hysterectomy in February she is experiencing brain fog, palpitations, and headaches. She has seen cardiology and worn a 24-hour Holter monitor which was benign.       Review of Systems   Constitutional:  Negative for chills and fever.   HENT:  Negative for ear pain and sore throat.    Eyes:  Negative for pain and visual disturbance.   Respiratory:  Negative for cough and shortness of breath.    Cardiovascular:  Negative for chest pain, palpitations and leg swelling.   Gastrointestinal:  Negative for abdominal pain, constipation, diarrhea, nausea and vomiting.   Genitourinary:  Negative for dysuria and hematuria.   Musculoskeletal:  Negative for arthralgias and back pain.   Skin:  Negative for color change and rash.   Neurological:  Negative for seizures and syncope.   All other systems reviewed and are negative.         Objective   /72 (BP Location: Left arm, Patient Position: Sitting, Cuff Size: Standard)   Pulse 63   Temp (!) 96 °F (35.6 °C) (Tympanic)   Resp 16   Ht 5' 5\" (1.651 m)   Wt (!) 140 kg (308 lb)   LMP 02/12/2024   SpO2 99%   BMI 51.25 kg/m²      Physical Exam  Vitals and nursing note reviewed.   Constitutional:       General: She is not in acute distress.     Appearance: She is well-developed.   HENT:      Head: Normocephalic and atraumatic.      Right Ear: Tympanic membrane normal.      Left Ear: Tympanic membrane normal.      Mouth/Throat:      Mouth: Mucous membranes are moist.      Pharynx: Oropharynx is clear.   Eyes:      Conjunctiva/sclera: Conjunctivae normal.   Cardiovascular:      Rate and Rhythm: Normal rate and regular rhythm.      Heart sounds: No murmur heard.  Pulmonary:      Effort: Pulmonary effort is normal. No respiratory distress.      Breath sounds: Normal breath sounds.   Musculoskeletal:         General: No swelling.    "   Cervical back: Neck supple.   Skin:     General: Skin is warm and dry.      Capillary Refill: Capillary refill takes less than 2 seconds.   Neurological:      Mental Status: She is alert.   Psychiatric:         Mood and Affect: Mood normal.

## 2024-12-03 NOTE — ASSESSMENT & PLAN NOTE
Increased to Tirzepatide 15 mg weekly dose.   Dicussed low-carb diet and regular exercise.  We will continue to monitor.  Follow up 3 months.

## 2024-12-03 NOTE — ASSESSMENT & PLAN NOTE
Discussed low-carb diet and regular exercise.   Will continue to monitor with A1c and fasting glucose.    Orders:    Hemoglobin A1C; Future     Unable to determine.

## 2024-12-03 NOTE — ASSESSMENT & PLAN NOTE
Blood pressure well controlled without medication.   Continue to monitor blood pressure at home. Patient may continue to take a half tablet of Ptinzide-Zestoretic 20-25 mg as needed for elevated blood pressure.   We will continue to monitor.    Orders:    CBC and differential; Future    Comprehensive metabolic panel; Future    Lipid Panel with Direct LDL reflex; Future    TSH, 3rd generation with Free T4 reflex; Future

## 2024-12-04 ENCOUNTER — TELEPHONE (OUTPATIENT)
Dept: FAMILY MEDICINE CLINIC | Facility: CLINIC | Age: 44
End: 2024-12-04

## 2024-12-18 DIAGNOSIS — E55.9 VITAMIN D DEFICIENCY: ICD-10-CM

## 2024-12-19 RX ORDER — ERGOCALCIFEROL 1.25 MG/1
CAPSULE, LIQUID FILLED ORAL
Qty: 12 CAPSULE | Refills: 1 | Status: SHIPPED | OUTPATIENT
Start: 2024-12-19

## 2024-12-26 DIAGNOSIS — E66.01 CLASS 3 SEVERE OBESITY DUE TO EXCESS CALORIES WITH SERIOUS COMORBIDITY AND BODY MASS INDEX (BMI) OF 50.0 TO 59.9 IN ADULT (HCC): ICD-10-CM

## 2024-12-26 DIAGNOSIS — E66.813 CLASS 3 SEVERE OBESITY DUE TO EXCESS CALORIES WITH SERIOUS COMORBIDITY AND BODY MASS INDEX (BMI) OF 50.0 TO 59.9 IN ADULT (HCC): ICD-10-CM

## 2024-12-26 RX ORDER — TIRZEPATIDE 15 MG/.5ML
15 INJECTION, SOLUTION SUBCUTANEOUS WEEKLY
Qty: 2 ML | Refills: 0 | Status: SHIPPED | OUTPATIENT
Start: 2024-12-26

## 2025-01-01 ENCOUNTER — PATIENT MESSAGE (OUTPATIENT)
Dept: FAMILY MEDICINE CLINIC | Facility: CLINIC | Age: 45
End: 2025-01-01

## 2025-01-11 DIAGNOSIS — E66.01 CLASS 3 SEVERE OBESITY DUE TO EXCESS CALORIES WITH SERIOUS COMORBIDITY AND BODY MASS INDEX (BMI) OF 50.0 TO 59.9 IN ADULT (HCC): ICD-10-CM

## 2025-01-11 DIAGNOSIS — E66.813 CLASS 3 SEVERE OBESITY DUE TO EXCESS CALORIES WITH SERIOUS COMORBIDITY AND BODY MASS INDEX (BMI) OF 50.0 TO 59.9 IN ADULT (HCC): ICD-10-CM

## 2025-01-13 RX ORDER — TIRZEPATIDE 15 MG/.5ML
INJECTION, SOLUTION SUBCUTANEOUS
Qty: 2 ML | Refills: 0 | Status: SHIPPED | OUTPATIENT
Start: 2025-01-13

## 2025-01-15 NOTE — PROGRESS NOTES
Name: Antonella Irving      : 1980      MRN: [id number]  Encounter Provider: Daria Nuñez MD  Encounter Date: 2025   Encounter department: [facility]  :  Assessment & Plan  Encounter for follow-up surveillance of endometrial cancer  43yo with h/o Stage IA endometrial adenocarcinoma, figo grade 1 presents for surveillance visit.    Clinically JULIETA  Exam benign - c/w mild cystocele likely the cause of pelvic pressure. Will continue to monitor       1 year from diagnosis. Continue with q6 month surveillance visits                History of Present Illness   Reason for Visit / CC: follow up    Antonella Irving is a 44 y.o. female   43yo with h/o Stage IA endometrial adenocarcinoma, figo grade 1 presents for surveillance visit. Pt continues to lose weight. She is about 75lb down. She reports new onset pelvic pressure that is intermittent, not related to anything. No incontinence, no changes in bowel movements. Pt denies abdominal bloating/pain/cramping. No vaginal bleeding/discharge. No early satiety/Appetite adequate.            Pertinent Medical History      Oncology History   Cancer Staging   Endometrial carcinoma (HCC)  Staging form: Corpus Uteri - Carcinoma, AJCC 8th Edition  - Clinical stage from 2024: FIGO Stage I (cT1, cM0) - Signed by Faustino Millan MD on 2024  Histopathologic type: Endometrioid adenocarcinoma, NOS  Stage prefix: Initial diagnosis  Histologic grade (G): G1  Histologic grading system: 3 grade system  Oncology History   Endometrial carcinoma (HCC)   2024 Initial Diagnosis    Endometrial carcinoma (HCC)     2024 -  Cancer Staged    Staging form: Corpus Uteri - Carcinoma, AJCC 8th Edition  - Clinical stage from 2024: FIGO Stage I (cT1, cM0) - Signed by Faustino Millan MD on 2024  Histopathologic type: Endometrioid adenocarcinoma, NOS  Stage prefix: Initial diagnosis  Histologic grade (G): G1  Histologic grading system:  3 grade system       2/27/2024 Surgery    RA-TLH/BSO/SLND    pMMR  HER2 2+ IHC, FISH neg        Review of Systems   Constitutional: Negative.    HENT: Negative.     Eyes: Negative.    Respiratory: Negative.     Cardiovascular: Negative.    Gastrointestinal: Negative.    Endocrine: Negative.    Genitourinary: Negative.    Musculoskeletal: Negative.    Neurological: Negative.    Psychiatric/Behavioral: Negative.      A complete review of systems is negative other than that noted above in the HPI.       Objective   /78 (BP Location: Left arm, Patient Position: Sitting, Cuff Size: Large)   Pulse 79   Temp (!) 97.4 °F (36.3 °C) (Temporal)   Wt (!) 137 kg (301 lb)   LMP 02/12/2024   SpO2 97%   BMI 50.09 kg/m²     Body mass index is 50.09 kg/m².  Pain Screening:  Pain Score: 0-No pain  ECOG ECOG Performance Status: 0 - Fully active, able to carry on all pre-disease performance without restriction   Physical Exam  HENT:      Head: Normocephalic and atraumatic.   Cardiovascular:      Rate and Rhythm: Normal rate and regular rhythm.   Pulmonary:      Effort: Pulmonary effort is normal.   Abdominal:      General: There is no distension.      Palpations: Abdomen is soft. There is no mass.   Genitourinary:     Comments: Chaperone present for exam.  The external female genitalia is normal. The bartholin's, uretheral and skenes glands are normal. The urethral meatus is normal (midline with no lesions). Anus without fissure or lesion. Speculum exam reveals a grossly normal vagina cuff. No masses, lesions,discharge or bleeding. Mild cystocele. Bimanual exam notes a surgical absent cervix, uterus and adnexal structures. No masses or fullness. Bladder is without fullness, mass or tenderness.    Musculoskeletal:         General: Normal range of motion.      Cervical back: Normal range of motion.   Skin:     General: Skin is warm and dry.   Neurological:      Mental Status: She is alert and oriented to person, place, and  "time.          Labs: I have reviewed pertinent labs.   No results found for: \"\"  Lab Results   Component Value Date/Time    Potassium 4.4 08/27/2024 09:26 AM    Chloride 101 08/27/2024 09:26 AM    CO2 29 08/27/2024 09:26 AM    BUN 22 08/27/2024 09:26 AM    Creatinine 1.02 08/27/2024 09:26 AM    Glucose, Fasting 82 08/27/2024 09:26 AM    Calcium 9.7 08/27/2024 09:26 AM    AST 29 08/27/2024 09:26 AM    ALT 24 08/27/2024 09:26 AM    Alkaline Phosphatase 48 08/27/2024 09:26 AM    eGFR 67 08/27/2024 09:26 AM     Lab Results   Component Value Date/Time    WBC 5.32 08/27/2024 09:26 AM    Hemoglobin 13.7 08/27/2024 09:26 AM    Hematocrit 42.3 08/27/2024 09:26 AM    MCV 87 08/27/2024 09:26 AM    Platelets 354 08/27/2024 09:26 AM     Lab Results   Component Value Date/Time    Absolute Neutrophils 2.91 08/27/2024 09:26 AM        Trend:  No results found for: \"\"            "

## 2025-01-15 NOTE — ASSESSMENT & PLAN NOTE
45yo with h/o Stage IA endometrial adenocarcinoma, figo grade 1 presents for surveillance visit.    Clinically JULIETA  Exam benign - c/w mild cystocele likely the cause of pelvic pressure. Will continue to monitor       1 year from diagnosis. Continue with q6 month surveillance visits

## 2025-01-17 ENCOUNTER — OFFICE VISIT (OUTPATIENT)
Dept: GYNECOLOGIC ONCOLOGY | Facility: CLINIC | Age: 45
End: 2025-01-17
Payer: COMMERCIAL

## 2025-01-17 VITALS
SYSTOLIC BLOOD PRESSURE: 130 MMHG | DIASTOLIC BLOOD PRESSURE: 78 MMHG | OXYGEN SATURATION: 97 % | HEART RATE: 79 BPM | WEIGHT: 293 LBS | BODY MASS INDEX: 50.09 KG/M2 | TEMPERATURE: 97.4 F

## 2025-01-17 DIAGNOSIS — Z08 ENCOUNTER FOR FOLLOW-UP SURVEILLANCE OF ENDOMETRIAL CANCER: Primary | ICD-10-CM

## 2025-01-17 DIAGNOSIS — Z85.42 ENCOUNTER FOR FOLLOW-UP SURVEILLANCE OF ENDOMETRIAL CANCER: Primary | ICD-10-CM

## 2025-01-17 PROCEDURE — 99213 OFFICE O/P EST LOW 20 MIN: CPT | Performed by: OBSTETRICS & GYNECOLOGY

## 2025-01-17 PROCEDURE — 99459 PELVIC EXAMINATION: CPT | Performed by: OBSTETRICS & GYNECOLOGY

## 2025-01-20 NOTE — TELEPHONE ENCOUNTER
Patient called in regards to her insurance no longer covering zepbound and has sent mychart messages and has not heard back from office. Patient would like to know what should she do as her insurance no longer covers medication.

## 2025-01-22 ENCOUNTER — OFFICE VISIT (OUTPATIENT)
Dept: FAMILY MEDICINE CLINIC | Facility: CLINIC | Age: 45
End: 2025-01-22
Payer: COMMERCIAL

## 2025-01-22 VITALS
SYSTOLIC BLOOD PRESSURE: 112 MMHG | HEIGHT: 65 IN | TEMPERATURE: 97.8 F | WEIGHT: 293 LBS | BODY MASS INDEX: 48.82 KG/M2 | OXYGEN SATURATION: 98 % | DIASTOLIC BLOOD PRESSURE: 78 MMHG | RESPIRATION RATE: 16 BRPM | HEART RATE: 84 BPM

## 2025-01-22 DIAGNOSIS — R35.0 FREQUENCY OF URINATION: ICD-10-CM

## 2025-01-22 DIAGNOSIS — J45.20 MILD INTERMITTENT ASTHMA WITHOUT COMPLICATION: ICD-10-CM

## 2025-01-22 DIAGNOSIS — E66.813 CLASS 3 SEVERE OBESITY DUE TO EXCESS CALORIES WITH SERIOUS COMORBIDITY AND BODY MASS INDEX (BMI) OF 50.0 TO 59.9 IN ADULT (HCC): ICD-10-CM

## 2025-01-22 DIAGNOSIS — E66.01 CLASS 3 SEVERE OBESITY DUE TO EXCESS CALORIES WITH SERIOUS COMORBIDITY AND BODY MASS INDEX (BMI) OF 50.0 TO 59.9 IN ADULT (HCC): ICD-10-CM

## 2025-01-22 DIAGNOSIS — C54.1 ENDOMETRIAL CARCINOMA (HCC): ICD-10-CM

## 2025-01-22 DIAGNOSIS — R29.818 SUSPECTED SLEEP APNEA: Primary | ICD-10-CM

## 2025-01-22 DIAGNOSIS — I10 ESSENTIAL HYPERTENSION: ICD-10-CM

## 2025-01-22 PROCEDURE — 99214 OFFICE O/P EST MOD 30 MIN: CPT | Performed by: FAMILY MEDICINE

## 2025-01-22 NOTE — ASSESSMENT & PLAN NOTE
Doing well on Zepbound but insurance does not cover weight loss medications anymore.  Was told if she is diagnosed with obstructive sleep apnea we could consider sending the medication since it is approved for sleep apnea.

## 2025-01-22 NOTE — ASSESSMENT & PLAN NOTE
Continue to follow-up with oncology.          SmartLink not supported outside of the Encounter Diagnoses SmartSection.

## 2025-01-22 NOTE — PROGRESS NOTES
Name: Antonella Irving      : 1980      MRN: [id number]  Encounter Provider: Ruddy Taylor MD  Encounter Date: 2025   Encounter department: [facility]    Assessment & Plan  Suspected sleep apnea  Referral to sleep medicine  Orders:  •  Ambulatory Referral to Sleep Medicine; Future    Endometrial carcinoma (HCC)  Continue to follow-up with oncology       Mild intermittent asthma without complication  Stable.  Continue same inhalers and we will continue to monitor.       Essential hypertension  Well-controlled on Prinzide  Continue to monitor blood pressure at home.   Orders:  •  CBC and differential; Future  •  Comprehensive metabolic panel; Future  •  Lipid Panel with Direct LDL reflex; Future  •  TSH, 3rd generation with Free T4 reflex; Future    Class 3 severe obesity due to excess calories with serious comorbidity and body mass index (BMI) of 50.0 to 59.9 in adult (HCC)  Doing well on Zepbound but insurance does not cover weight loss medications anymore.  Was told if she is diagnosed with obstructive sleep apnea we could consider sending the medication since it is approved for sleep apnea.       Frequency of urination    Orders:  •  UA w Reflex to Microscopic w Reflex to Culture; Future         History of Present Illness     She is here today for follow-up for weight management.  She stated her insurance is not going to cover Zepbound anymore.  She lost 6 pounds since last visit.  She denies any side effect from the medication.  She complained of pressure with urination      Review of Systems   Constitutional:  Negative for chills and fever.   HENT:  Negative for trouble swallowing.    Eyes:  Negative for visual disturbance.   Respiratory:  Negative for cough and shortness of breath.    Cardiovascular:  Negative for chest pain, palpitations and leg swelling.   Gastrointestinal:  Negative for abdominal pain, constipation and diarrhea.   Endocrine: Negative for cold  intolerance and heat intolerance.   Genitourinary:  Positive for difficulty urinating. Negative for dysuria.   Musculoskeletal:  Negative for gait problem.   Skin:  Negative for rash.   Neurological:  Negative for dizziness, tremors, seizures and headaches.   Hematological:  Negative for adenopathy.   Psychiatric/Behavioral:  Negative for behavioral problems.      Past Medical History:   Diagnosis Date   • Asthma    • Depression    • Hyperlipemia    • Hypertension    • Obesity, morbid, BMI 50 or higher (HCC)    • Papanicolaou smear of cervix with positive high risk human papilloma virus (HPV) test     12/2023 pap negative, + HPV type 16;  COLPO:   • Sleep apnea      Past Surgical History:   Procedure Laterality Date   • HERNIA REPAIR  1999   • IR ASPIRATION ONLY  3/12/2024   • AR INJECTION PROCEDURE LYMPHANGIOGRAPHY  2/27/2024    Procedure: LYMPHOGRAPHY WITH INDOCYANINE GREEN (ICG);  Surgeon: Daria Nuñez MD;  Location: [facility];  Service: Gynecology Oncology   • AR LAPS TOTAL HYSTERECT 250 GM/< W/RMVL TUBE/OVARY N/A 2/27/2024    Procedure: HYSTERECTOMY LAPAROSCOPIC TOTAL (LTH) W/ ROBOTICS, BILATERAL SALPINGO-OOPHORECTOMY, LYMPH NODE DISSECTION;  Surgeon: Daria Nuñez MD;  Location: [facility];  Service: Gynecology Oncology     Family History   Problem Relation Age of Onset   • Hypertension Mother    • Diabetes Mother         Mellitus   • Pancreatic cancer Father    • Diabetes Father         Mellitus   • Coronary artery disease Father    • Cancer Father         Pancreatic   • No Known Problems Sister    • No Known Problems Sister    • No Known Problems Sister    • No Known Problems Sister    • No Known Problems Brother    • No Known Problems Brother    • No Known Problems Brother    • No Known Problems Maternal Grandmother    • No Known Problems Maternal Grandfather    • Breast cancer Paternal Grandmother    • Cancer Paternal Grandmother         Breast   • No Known Problems Paternal Grandfather    • Cervical  cancer Maternal Aunt    • Cancer Maternal Aunt         cervical   • Breast cancer Paternal Aunt 64   • Diabetes Family         Mellitus   • Colon cancer Neg Hx    • Ovarian cancer Neg Hx    • Uterine cancer Neg Hx      Social History     Tobacco Use   • Smoking status: Former     Current packs/day: 0.00     Types: Cigarettes     Quit date: 2005     Years since quittin.0   • Smokeless tobacco: Never   • Tobacco comments:     10 per day. No passive smoke exposure   Vaping Use   • Vaping status: Never Used   Substance and Sexual Activity   • Alcohol use: Not Currently   • Drug use: Not Currently     Frequency: 1.0 times per week     Types: Marijuana     Comment: usage in [employer]   • Sexual activity: Not Currently     Partners: Male     Birth control/protection: Condom Male     Comment: last SA one year ago     Current Outpatient Medications on File Prior to Visit   Medication Sig   • albuterol (Ventolin HFA) 90 mcg/act inhaler Inhale 2 puffs every 6 (six) hours as needed for wheezing   • ergocalciferol (VITAMIN D2) 50,000 units TAKE 1 CAPSULE BY MOUTH ONE TIME PER WEEK   • FLUoxetine (PROzac) 40 MG capsule TAKE 1 CAPSULE BY MOUTH EVERY DAY   • lisinopril-hydrochlorothiazide (PRINZIDE,ZESTORETIC) 20-25 MG per tablet TAKE 1 TABLET BY MOUTH EVERY DAY   • ondansetron (ZOFRAN) 4 mg tablet Take 1 tablet (4 mg total) by mouth every 8 (eight) hours as needed for nausea or vomiting   • pantoprazole (PROTONIX) 40 mg tablet TAKE 1 TABLET BY MOUTH DAILY BEFORE BREAKFAST   • rosuvastatin (CRESTOR) 5 mg tablet TAKE 1 TABLET (5 MG TOTAL) BY MOUTH DAILY.   • Zepbound 15 MG/0.5ML auto-injector INJECT 15MG SUBCUTANEOUSLY (UNDER THE SKIN) ONCE WEEKLY   • traZODone (DESYREL) 50 mg tablet TAKE 1 TABLET BY MOUTH DAILY AT BEDTIME (Patient not taking: Reported on 10/22/2024)     Allergies   Allergen Reactions   • Shellfish Allergy - Food Allergy Throat Swelling     Immunization History   Administered Date(s) Administered   •  "COVID-19 PFIZER VACCINE 0.3 ML IM 08/02/2021, 08/23/2021, 02/22/2022   • COVID-19 Pfizer vac (Riley-sucrose, gray cap) 12 yr+ IM 02/22/2022   • Fluzone Split Quad 0.25 mL 10/05/2017   • INFLUENZA 10/05/2017     Objective   /78 (BP Location: Left arm, Patient Position: Sitting, Cuff Size: Large)   Pulse 84   Temp 97.8 °F (36.6 °C) (Tympanic)   Resp 16   Ht 5' 5\" (1.651 m)   Wt (!) 137 kg (301 lb)   LMP 02/12/2024   SpO2 98%   BMI 50.09 kg/m²     Physical Exam  Vitals and nursing note reviewed.   Constitutional:       Appearance: She is well-developed.   HENT:      Head: Normocephalic and atraumatic.   Eyes:      Pupils: Pupils are equal, round, and reactive to light.   Cardiovascular:      Rate and Rhythm: Normal rate and regular rhythm.      Heart sounds: Normal heart sounds.   Pulmonary:      Effort: Pulmonary effort is normal.      Breath sounds: Normal breath sounds.   Abdominal:      General: Bowel sounds are normal.      Palpations: Abdomen is soft.   Musculoskeletal:      Cervical back: Normal range of motion and neck supple.   Lymphadenopathy:      Cervical: No cervical adenopathy.   Skin:     General: Skin is warm.   Neurological:      Mental Status: She is alert and oriented to person, place, and time.         "

## 2025-01-22 NOTE — ASSESSMENT & PLAN NOTE
Well-controlled on Prinzide  Continue to monitor blood pressure at home.   Orders:  •  CBC and differential; Future  •  Comprehensive metabolic panel; Future  •  Lipid Panel with Direct LDL reflex; Future  •  TSH, 3rd generation with Free T4 reflex; Future

## 2025-01-23 ENCOUNTER — TELEPHONE (OUTPATIENT)
Age: 45
End: 2025-01-23

## 2025-01-23 NOTE — TELEPHONE ENCOUNTER
Patient called is unable to accept appointment for today but would like to remain on wait list.  Thank you!

## 2025-02-07 ENCOUNTER — TELEPHONE (OUTPATIENT)
Age: 45
End: 2025-02-07

## 2025-02-07 NOTE — TELEPHONE ENCOUNTER
PA for Zepbound 15MG/0.5ML SUBMITTED to [employer]    via    [x]CMM-KEY: [id number]  []Surescripts-Case ID #   []Availity-Auth ID # NDC #   []Faxed to plan   []Other website   []Phone call Case ID #     [x]PA sent as URGENT    All office notes, labs and other pertaining documents and studies sent. Clinical questions answered. Awaiting determination from insurance company.     Turnaround time for your insurance to make a decision on your Prior Authorization can take 7-21 business days.

## 2025-02-10 NOTE — TELEPHONE ENCOUNTER
Zepbound excluded from benefits, I called pt and she got a message from the auth team stating we can appeal it to try to get it approved.   Would you like to appeal?

## 2025-02-10 NOTE — TELEPHONE ENCOUNTER
PA for Zepbound 15MG/0.5ML EXCLUDED from plan     Reason:        Message sent to office clinical pool Yes

## 2025-02-11 NOTE — TELEPHONE ENCOUNTER
Please start the appeal process. Pt has a history of endometrial carcinoma,HTN,possible sleep apnea, asthma and a BMI greater then 50.

## 2025-02-12 NOTE — TELEPHONE ENCOUNTER
The patient states that she has spoken to her insurance and the representative suggested that a peer to peer review be completed. Patient has been taking the medication x 6 months and stressing the importance of continued therapy. Patient has lost 70 pounds. Please call the patient after the peer to peer review.

## 2025-02-27 ENCOUNTER — APPOINTMENT (OUTPATIENT)
Dept: LAB | Facility: IMAGING CENTER | Age: 45
End: 2025-02-27
Payer: COMMERCIAL

## 2025-02-27 ENCOUNTER — HOSPITAL ENCOUNTER (OUTPATIENT)
Dept: RADIOLOGY | Facility: IMAGING CENTER | Age: 45
End: 2025-02-27
Payer: COMMERCIAL

## 2025-02-27 VITALS — WEIGHT: 293 LBS | BODY MASS INDEX: 48.82 KG/M2 | HEIGHT: 65 IN

## 2025-02-27 DIAGNOSIS — E78.49 OTHER HYPERLIPIDEMIA: ICD-10-CM

## 2025-02-27 DIAGNOSIS — I10 ESSENTIAL HYPERTENSION: ICD-10-CM

## 2025-02-27 DIAGNOSIS — R35.0 FREQUENCY OF URINATION: ICD-10-CM

## 2025-02-27 DIAGNOSIS — R73.9 HYPERGLYCEMIA: ICD-10-CM

## 2025-02-27 DIAGNOSIS — Z12.31 ENCOUNTER FOR SCREENING MAMMOGRAM FOR MALIGNANT NEOPLASM OF BREAST: ICD-10-CM

## 2025-02-27 LAB
ALBUMIN SERPL BCG-MCNC: 4.1 G/DL (ref 3.5–5)
ALP SERPL-CCNC: 63 U/L (ref 34–104)
ALT SERPL W P-5'-P-CCNC: 22 U/L (ref 7–52)
ANION GAP SERPL CALCULATED.3IONS-SCNC: 6 MMOL/L (ref 4–13)
AST SERPL W P-5'-P-CCNC: 19 U/L (ref 13–39)
BASOPHILS # BLD AUTO: 0.05 THOUSANDS/ÂΜL (ref 0–0.1)
BASOPHILS NFR BLD AUTO: 1 % (ref 0–1)
BILIRUB SERPL-MCNC: 0.47 MG/DL (ref 0.2–1)
BILIRUB UR QL STRIP: NEGATIVE
BUN SERPL-MCNC: 16 MG/DL (ref 5–25)
CALCIUM SERPL-MCNC: 9.3 MG/DL (ref 8.4–10.2)
CHLORIDE SERPL-SCNC: 104 MMOL/L (ref 96–108)
CHOLEST SERPL-MCNC: 142 MG/DL (ref ?–200)
CLARITY UR: CLEAR
CO2 SERPL-SCNC: 29 MMOL/L (ref 21–32)
COLOR UR: YELLOW
CREAT SERPL-MCNC: 0.82 MG/DL (ref 0.6–1.3)
EOSINOPHIL # BLD AUTO: 0.15 THOUSAND/ÂΜL (ref 0–0.61)
EOSINOPHIL NFR BLD AUTO: 2 % (ref 0–6)
ERYTHROCYTE [DISTWIDTH] IN BLOOD BY AUTOMATED COUNT: 12.8 % (ref 11.6–15.1)
EST. AVERAGE GLUCOSE BLD GHB EST-MCNC: 108 MG/DL
GFR SERPL CREATININE-BSD FRML MDRD: 87 ML/MIN/1.73SQ M
GLUCOSE P FAST SERPL-MCNC: 86 MG/DL (ref 65–99)
GLUCOSE UR STRIP-MCNC: NEGATIVE MG/DL
HBA1C MFR BLD: 5.4 %
HCT VFR BLD AUTO: 41.9 % (ref 34.8–46.1)
HDLC SERPL-MCNC: 50 MG/DL
HGB BLD-MCNC: 13.4 G/DL (ref 11.5–15.4)
HGB UR QL STRIP.AUTO: NEGATIVE
IMM GRANULOCYTES # BLD AUTO: 0.01 THOUSAND/UL (ref 0–0.2)
IMM GRANULOCYTES NFR BLD AUTO: 0 % (ref 0–2)
KETONES UR STRIP-MCNC: NEGATIVE MG/DL
LDLC SERPL CALC-MCNC: 59 MG/DL (ref 0–100)
LEUKOCYTE ESTERASE UR QL STRIP: NEGATIVE
LYMPHOCYTES # BLD AUTO: 1.91 THOUSANDS/ÂΜL (ref 0.6–4.47)
LYMPHOCYTES NFR BLD AUTO: 31 % (ref 14–44)
MCH RBC QN AUTO: 28.1 PG (ref 26.8–34.3)
MCHC RBC AUTO-ENTMCNC: 32 G/DL (ref 31.4–37.4)
MCV RBC AUTO: 88 FL (ref 82–98)
MONOCYTES # BLD AUTO: 0.37 THOUSAND/ÂΜL (ref 0.17–1.22)
MONOCYTES NFR BLD AUTO: 6 % (ref 4–12)
NEUTROPHILS # BLD AUTO: 3.71 THOUSANDS/ÂΜL (ref 1.85–7.62)
NEUTS SEG NFR BLD AUTO: 60 % (ref 43–75)
NITRITE UR QL STRIP: NEGATIVE
NRBC BLD AUTO-RTO: 0 /100 WBCS
PH UR STRIP.AUTO: 6.5 [PH]
PLATELET # BLD AUTO: 319 THOUSANDS/UL (ref 149–390)
PMV BLD AUTO: 10.4 FL (ref 8.9–12.7)
POTASSIUM SERPL-SCNC: 4.5 MMOL/L (ref 3.5–5.3)
PROT SERPL-MCNC: 6.6 G/DL (ref 6.4–8.4)
PROT UR STRIP-MCNC: NEGATIVE MG/DL
RBC # BLD AUTO: 4.77 MILLION/UL (ref 3.81–5.12)
SODIUM SERPL-SCNC: 139 MMOL/L (ref 135–147)
SP GR UR STRIP.AUTO: 1.02 (ref 1–1.03)
TRIGL SERPL-MCNC: 164 MG/DL (ref ?–150)
TSH SERPL DL<=0.05 MIU/L-ACNC: 0.97 UIU/ML (ref 0.45–4.5)
UROBILINOGEN UR STRIP-ACNC: <2 MG/DL
WBC # BLD AUTO: 6.2 THOUSAND/UL (ref 4.31–10.16)

## 2025-02-27 PROCEDURE — 83036 HEMOGLOBIN GLYCOSYLATED A1C: CPT

## 2025-02-27 PROCEDURE — 84443 ASSAY THYROID STIM HORMONE: CPT

## 2025-02-27 PROCEDURE — 80061 LIPID PANEL: CPT

## 2025-02-27 PROCEDURE — 80053 COMPREHEN METABOLIC PANEL: CPT

## 2025-02-27 PROCEDURE — 36415 COLL VENOUS BLD VENIPUNCTURE: CPT

## 2025-02-27 PROCEDURE — 85025 COMPLETE CBC W/AUTO DIFF WBC: CPT

## 2025-02-27 PROCEDURE — 77063 BREAST TOMOSYNTHESIS BI: CPT

## 2025-02-27 PROCEDURE — 81003 URINALYSIS AUTO W/O SCOPE: CPT

## 2025-02-27 PROCEDURE — 77067 SCR MAMMO BI INCL CAD: CPT

## 2025-03-03 ENCOUNTER — OFFICE VISIT (OUTPATIENT)
Dept: FAMILY MEDICINE CLINIC | Facility: CLINIC | Age: 45
End: 2025-03-03
Payer: COMMERCIAL

## 2025-03-03 VITALS
HEIGHT: 65 IN | DIASTOLIC BLOOD PRESSURE: 80 MMHG | OXYGEN SATURATION: 98 % | HEART RATE: 70 BPM | WEIGHT: 293 LBS | RESPIRATION RATE: 16 BRPM | BODY MASS INDEX: 48.82 KG/M2 | SYSTOLIC BLOOD PRESSURE: 128 MMHG | TEMPERATURE: 97.8 F

## 2025-03-03 DIAGNOSIS — K21.9 GASTROESOPHAGEAL REFLUX DISEASE WITHOUT ESOPHAGITIS: ICD-10-CM

## 2025-03-03 DIAGNOSIS — E66.01 CLASS 3 SEVERE OBESITY DUE TO EXCESS CALORIES WITH SERIOUS COMORBIDITY AND BODY MASS INDEX (BMI) OF 50.0 TO 59.9 IN ADULT (HCC): Primary | ICD-10-CM

## 2025-03-03 DIAGNOSIS — E66.813 CLASS 3 SEVERE OBESITY DUE TO EXCESS CALORIES WITH SERIOUS COMORBIDITY AND BODY MASS INDEX (BMI) OF 50.0 TO 59.9 IN ADULT (HCC): Primary | ICD-10-CM

## 2025-03-03 DIAGNOSIS — I10 ESSENTIAL HYPERTENSION: ICD-10-CM

## 2025-03-03 DIAGNOSIS — E78.49 OTHER HYPERLIPIDEMIA: ICD-10-CM

## 2025-03-03 DIAGNOSIS — F32.2 SEVERE MAJOR DEPRESSIVE DISORDER (HCC): ICD-10-CM

## 2025-03-03 PROCEDURE — 99214 OFFICE O/P EST MOD 30 MIN: CPT | Performed by: FAMILY MEDICINE

## 2025-03-03 RX ORDER — PHENTERMINE HYDROCHLORIDE 37.5 MG/1
37.5 TABLET ORAL DAILY
Qty: 30 TABLET | Refills: 0 | Status: SHIPPED | OUTPATIENT
Start: 2025-03-03

## 2025-03-03 NOTE — ASSESSMENT & PLAN NOTE
Depression Screening Follow-up Plan: Patient's depression screening was positive with a PHQ-9 score of 16. Patient with underlying depression and was advised to continue current medications as prescribed.  Stable on Prozac.  We will continue to monitor.

## 2025-03-03 NOTE — PROGRESS NOTES
Name: Antonella Irving      : 1980      MRN: [id number]  Encounter Provider: Ruddy Taylor MD  Encounter Date: 3/3/2025   Encounter department:  [facility]    Assessment & Plan  Class 3 severe obesity due to excess calories with serious comorbidity and body mass index (BMI) of 50.0 to 59.9 in adult (HCC)  Not well-controlled.  This was discussed with patient about low-carb diet and regular exercise.  I am going to start her on phentermine 37.5 mg daily.  Discussed about other weight loss options.  Come back in 1 month.  Orders:  •  phentermine (ADIPEX-P) 37.5 MG tablet; Take 1 tablet (37.5 mg total) by mouth in the morning    Severe major depressive disorder (HCC)  Depression Screening Follow-up Plan: Patient's depression screening was positive with a PHQ-9 score of 16. Patient with underlying depression and was advised to continue current medications as prescribed.  Stable on Prozac.  We will continue to monitor.       Essential hypertension  Well-controlled on Prinzide  Continue to monitor blood pressure at home.   Orders:  •  CBC and differential; Future  •  Comprehensive metabolic panel; Future  •  Lipid Panel with Direct LDL reflex; Future  •  TSH, 3rd generation with Free T4 reflex; Future    Gastroesophageal reflux disease without esophagitis  Stable on pantoprazole.  Continue same.  Will continue to monitor.       Other hyperlipidemia  Not well-controlled.  Discussed about low-fat diet and regular exercise.  Continue on Crestor 5 mg daily.  Continue to monitor fasting lipid profile and CMP.  Orders:  •  CBC and differential; Future  •  Comprehensive metabolic panel; Future  •  Lipid Panel with Direct LDL reflex; Future  •  TSH, 3rd generation with Free T4 reflex; Future      Depression Screening and Follow-up Plan:   Patient with underlying depression and was advised to continue current medications as prescribed.         History of Present Illness     She is here today  for follow-up multiple medical problems.  She had her blood work done recently came back showing slightly elevated triglyceride otherwise blood work came back normal.  Her Zepbound is not covered anymore by her insurance.  She gained some weight since last visit.  She continues trying to lose weight and watch her diet.    Depression  Pertinent negatives include no abdominal pain, chest pain, chills, coughing, fever, headaches or rash.     Review of Systems   Constitutional:  Negative for chills and fever.   HENT:  Negative for trouble swallowing.    Eyes:  Negative for visual disturbance.   Respiratory:  Negative for cough and shortness of breath.    Cardiovascular:  Negative for chest pain, palpitations and leg swelling.   Gastrointestinal:  Negative for abdominal pain, constipation and diarrhea.   Endocrine: Negative for cold intolerance and heat intolerance.   Genitourinary:  Negative for difficulty urinating and dysuria.   Musculoskeletal:  Negative for gait problem.   Skin:  Negative for rash.   Neurological:  Negative for dizziness, tremors, seizures and headaches.   Hematological:  Negative for adenopathy.   Psychiatric/Behavioral:  Positive for depression. Negative for behavioral problems.      Past Medical History:   Diagnosis Date   • Asthma    • Depression    • Hyperlipemia    • Hypertension    • Obesity, morbid, BMI 50 or higher (HCC)    • Papanicolaou smear of cervix with positive high risk human papilloma virus (HPV) test     12/2023 pap negative, + HPV type 16;  COLPO:   • Sleep apnea      Past Surgical History:   Procedure Laterality Date   • HERNIA REPAIR  1999   • HYSTERECTOMY  02/27/2024   • IR ASPIRATION ONLY  03/12/2024   • OOPHORECTOMY Bilateral 02/27/2024   • HI INJECTION PROCEDURE LYMPHANGIOGRAPHY  02/27/2024    Procedure: LYMPHOGRAPHY WITH INDOCYANINE GREEN (ICG);  Surgeon: Daria Nuñez MD;  Location: [facility];  Service: Gynecology Oncology   • HI LAPS TOTAL HYSTERECT 250 GM/< W/RMVL  TUBE/OVARY N/A 2024    Procedure: HYSTERECTOMY LAPAROSCOPIC TOTAL (LTH) W/ ROBOTICS, BILATERAL SALPINGO-OOPHORECTOMY, LYMPH NODE DISSECTION;  Surgeon: Daria Nuñez MD;  Location: [facility];  Service: Gynecology Oncology     Family History   Problem Relation Age of Onset   • Hypertension Mother    • Diabetes Mother         Mellitus   • Pancreatic cancer Father    • Diabetes Father         Mellitus   • Coronary artery disease Father    • Cancer Father         Pancreatic   • No Known Problems Sister    • No Known Problems Sister    • No Known Problems Sister    • No Known Problems Sister    • No Known Problems Maternal Grandmother    • No Known Problems Maternal Grandfather    • Breast cancer Paternal Grandmother    • Cancer Paternal Grandmother         Breast   • No Known Problems Paternal Grandfather    • No Known Problems Brother    • No Known Problems Brother    • No Known Problems Brother    • Cervical cancer Maternal Aunt    • Cancer Maternal Aunt         cervical   • Breast cancer Paternal Aunt 64   • Diabetes Family         Mellitus   • Colon cancer Neg Hx    • Ovarian cancer Neg Hx    • Uterine cancer Neg Hx      Social History     Tobacco Use   • Smoking status: Former     Current packs/day: 0.00     Types: Cigarettes     Quit date: 2005     Years since quittin.1   • Smokeless tobacco: Never   • Tobacco comments:     10 per day. No passive smoke exposure   Vaping Use   • Vaping status: Never Used   Substance and Sexual Activity   • Alcohol use: Not Currently   • Drug use: Not Currently     Frequency: 1.0 times per week     Types: Marijuana     Comment: usage in highschool   • Sexual activity: Not Currently     Partners: Male     Birth control/protection: Condom Male     Comment: last SA one year ago     Current Outpatient Medications on File Prior to Visit   Medication Sig   • albuterol (Ventolin HFA) 90 mcg/act inhaler Inhale 2 puffs every 6 (six) hours as needed for wheezing   •  "ergocalciferol (VITAMIN D2) 50,000 units TAKE 1 CAPSULE BY MOUTH ONE TIME PER WEEK   • FLUoxetine (PROzac) 40 MG capsule TAKE 1 CAPSULE BY MOUTH EVERY DAY   • lisinopril-hydrochlorothiazide (PRINZIDE,ZESTORETIC) 20-25 MG per tablet TAKE 1 TABLET BY MOUTH EVERY DAY   • ondansetron (ZOFRAN) 4 mg tablet Take 1 tablet (4 mg total) by mouth every 8 (eight) hours as needed for nausea or vomiting   • pantoprazole (PROTONIX) 40 mg tablet TAKE 1 TABLET BY MOUTH DAILY BEFORE BREAKFAST   • rosuvastatin (CRESTOR) 5 mg tablet TAKE 1 TABLET (5 MG TOTAL) BY MOUTH DAILY.   • Zepbound 15 MG/0.5ML auto-injector INJECT 15MG SUBCUTANEOUSLY (UNDER THE SKIN) ONCE WEEKLY (Patient not taking: Reported on 3/3/2025)   • [DISCONTINUED] traZODone (DESYREL) 50 mg tablet TAKE 1 TABLET BY MOUTH DAILY AT BEDTIME (Patient not taking: Reported on 10/22/2024)     Allergies   Allergen Reactions   • Shellfish Allergy - Food Allergy Throat Swelling     Immunization History   Administered Date(s) Administered   • COVID-19 PFIZER VACCINE 0.3 ML IM 08/02/2021, 08/23/2021, 02/22/2022   • COVID-19 Pfizer vac (Riley-sucrose, gray cap) 12 yr+ IM 02/22/2022   • Fluzone Split Quad 0.25 mL 10/05/2017   • INFLUENZA 10/05/2017     Objective   /80 (BP Location: Left arm, Patient Position: Sitting, Cuff Size: Large)   Pulse 70   Temp 97.8 °F (36.6 °C) (Tympanic)   Resp 16   Ht 5' 5\" (1.651 m)   Wt (!) 142 kg (312 lb)   LMP 02/12/2024   SpO2 98%   BMI 51.92 kg/m²     Physical Exam  Vitals and nursing note reviewed.   Constitutional:       Appearance: She is well-developed.   HENT:      Head: Normocephalic and atraumatic.   Eyes:      Pupils: Pupils are equal, round, and reactive to light.   Cardiovascular:      Rate and Rhythm: Normal rate and regular rhythm.      Heart sounds: Normal heart sounds.   Pulmonary:      Effort: Pulmonary effort is normal.      Breath sounds: Normal breath sounds.   Abdominal:      General: Bowel sounds are normal.      " Palpations: Abdomen is soft.   Musculoskeletal:      Cervical back: Normal range of motion and neck supple.   Lymphadenopathy:      Cervical: No cervical adenopathy.   Skin:     General: Skin is warm.   Neurological:      Mental Status: She is alert and oriented to person, place, and time.       Depression Screening Follow-up Plan: Patient's depression screening was positive with a PHQ-9 score of 16. Patient with underlying depression and was advised to continue current medications as prescribed.

## 2025-03-03 NOTE — ASSESSMENT & PLAN NOTE
Not well-controlled.  Discussed about low-fat diet and regular exercise.  Continue on Crestor 5 mg daily.  Continue to monitor fasting lipid profile and CMP.  Orders:  •  CBC and differential; Future  •  Comprehensive metabolic panel; Future  •  Lipid Panel with Direct LDL reflex; Future  •  TSH, 3rd generation with Free T4 reflex; Future

## 2025-03-03 NOTE — ASSESSMENT & PLAN NOTE
Not well-controlled.  This was discussed with patient about low-carb diet and regular exercise.  I am going to start her on phentermine 37.5 mg daily.  Discussed about other weight loss options.  Come back in 1 month.  Orders:  •  phentermine (ADIPEX-P) 37.5 MG tablet; Take 1 tablet (37.5 mg total) by mouth in the morning

## 2025-03-06 DIAGNOSIS — F32.A DEPRESSION, UNSPECIFIED DEPRESSION TYPE: ICD-10-CM

## 2025-03-06 RX ORDER — FLUOXETINE HYDROCHLORIDE 40 MG/1
40 CAPSULE ORAL DAILY
Qty: 90 CAPSULE | Refills: 1 | Status: SHIPPED | OUTPATIENT
Start: 2025-03-06

## 2025-03-24 ENCOUNTER — RA CDI HCC (OUTPATIENT)
Dept: OTHER | Facility: HOSPITAL | Age: 45
End: 2025-03-24

## 2025-03-26 DIAGNOSIS — E78.49 OTHER HYPERLIPIDEMIA: ICD-10-CM

## 2025-03-27 RX ORDER — ROSUVASTATIN CALCIUM 5 MG/1
5 TABLET, COATED ORAL DAILY
Qty: 90 TABLET | Refills: 1 | Status: SHIPPED | OUTPATIENT
Start: 2025-03-27

## 2025-03-31 ENCOUNTER — OFFICE VISIT (OUTPATIENT)
Dept: FAMILY MEDICINE CLINIC | Facility: CLINIC | Age: 45
End: 2025-03-31
Payer: COMMERCIAL

## 2025-03-31 VITALS
HEIGHT: 65 IN | WEIGHT: 293 LBS | BODY MASS INDEX: 48.82 KG/M2 | OXYGEN SATURATION: 98 % | HEART RATE: 68 BPM | SYSTOLIC BLOOD PRESSURE: 120 MMHG | DIASTOLIC BLOOD PRESSURE: 82 MMHG | RESPIRATION RATE: 16 BRPM | TEMPERATURE: 97 F

## 2025-03-31 DIAGNOSIS — E66.01 CLASS 3 SEVERE OBESITY DUE TO EXCESS CALORIES WITH SERIOUS COMORBIDITY AND BODY MASS INDEX (BMI) OF 50.0 TO 59.9 IN ADULT (HCC): ICD-10-CM

## 2025-03-31 DIAGNOSIS — E66.813 CLASS 3 SEVERE OBESITY DUE TO EXCESS CALORIES WITH SERIOUS COMORBIDITY AND BODY MASS INDEX (BMI) OF 50.0 TO 59.9 IN ADULT (HCC): ICD-10-CM

## 2025-03-31 DIAGNOSIS — I10 ESSENTIAL HYPERTENSION: Primary | ICD-10-CM

## 2025-03-31 PROCEDURE — 99213 OFFICE O/P EST LOW 20 MIN: CPT | Performed by: FAMILY MEDICINE

## 2025-03-31 NOTE — PROGRESS NOTES
Name: Antonella Irving      : 1980      MRN: [id number]  Encounter Provider: Ruddy Taylor MD  Encounter Date: 3/31/2025   Encounter department: [facility]    Assessment & Plan  Essential hypertension  Well-controlled on Prinzide  Continue to monitor blood pressure at home.        Class 3 severe obesity due to excess calories with serious comorbidity and body mass index (BMI) of 50.0 to 59.9 in adult (HCC)  Not improving.  We will continue on phentermine 37.5 mg daily.  She was given samples for Ozempic to do 0.25 mg weekly and then 0.5 mg weekly.  Discussed with possible side effect.  Discussed about low-carb diet and regular exercise.  Come back in 4 to 6 weeks.            History of Present Illness     Pt is a 46 y/o w PMH of HTN, MDD, GERD, and HLD presenting today for 1 month follow-up on phentermine for weight loss. She reports she is feeling well overall, but she is having daily fatigue. She has an appointment scheduled w sleep medicine in . Otherwise she has no active complaints today. She is taking all medications as prescribed and denies and side effects. Her blood pressure in the office today is well controlled at 120/82.       Review of Systems   Constitutional:  Negative for chills and fever.   HENT:  Negative for trouble swallowing.    Eyes:  Negative for visual disturbance.   Respiratory:  Negative for cough and shortness of breath.    Cardiovascular:  Negative for chest pain, palpitations and leg swelling.   Gastrointestinal:  Negative for abdominal pain, constipation and diarrhea.   Endocrine: Negative for cold intolerance and heat intolerance.   Genitourinary:  Negative for difficulty urinating and dysuria.   Musculoskeletal:  Negative for gait problem.   Skin:  Negative for rash.   Neurological:  Negative for dizziness, tremors, seizures and headaches.   Hematological:  Negative for adenopathy.   Psychiatric/Behavioral:  Negative for behavioral problems.       Past Medical History:   Diagnosis Date   • Asthma    • Depression    • Hyperlipemia    • Hypertension    • Obesity, morbid, BMI 50 or higher (HCC)    • Papanicolaou smear of cervix with positive high risk human papilloma virus (HPV) test     12/2023 pap negative, + HPV type 16;  COLPO:   • Sleep apnea      Past Surgical History:   Procedure Laterality Date   • HERNIA REPAIR  1999   • HYSTERECTOMY  02/27/2024   • IR ASPIRATION ONLY  03/12/2024   • OOPHORECTOMY Bilateral 02/27/2024   • NE INJECTION PROCEDURE LYMPHANGIOGRAPHY  02/27/2024    Procedure: LYMPHOGRAPHY WITH INDOCYANINE GREEN (ICG);  Surgeon: Daria Nuñez MD;  Location: [facility];  Service: Gynecology Oncology   • NE LAPS TOTAL HYSTERECT 250 GM/< W/RMVL TUBE/OVARY N/A 02/27/2024    Procedure: HYSTERECTOMY LAPAROSCOPIC TOTAL (LTH) W/ ROBOTICS, BILATERAL SALPINGO-OOPHORECTOMY, LYMPH NODE DISSECTION;  Surgeon: Daria Nuñez MD;  Location: [facility];  Service: Gynecology Oncology     Family History   Problem Relation Age of Onset   • Hypertension Mother    • Diabetes Mother         Mellitus   • Pancreatic cancer Father    • Diabetes Father         Mellitus   • Coronary artery disease Father    • Cancer Father         Pancreatic   • No Known Problems Sister    • No Known Problems Sister    • No Known Problems Sister    • No Known Problems Sister    • No Known Problems Maternal Grandmother    • No Known Problems Maternal Grandfather    • Breast cancer Paternal Grandmother    • Cancer Paternal Grandmother         Breast   • No Known Problems Paternal Grandfather    • No Known Problems Brother    • No Known Problems Brother    • No Known Problems Brother    • Cervical cancer Maternal Aunt    • Cancer Maternal Aunt         cervical   • Breast cancer Paternal Aunt 64   • Diabetes Family         Mellitus   • Colon cancer Neg Hx    • Ovarian cancer Neg Hx    • Uterine cancer Neg Hx      Social History     Tobacco Use   • Smoking status: Former     Current  packs/day: 0.00     Types: Cigarettes     Quit date: 2005     Years since quittin.2   • Smokeless tobacco: Never   • Tobacco comments:     10 per day. No passive smoke exposure   Vaping Use   • Vaping status: Never Used   Substance and Sexual Activity   • Alcohol use: Not Currently   • Drug use: Not Currently     Frequency: 1.0 times per week     Types: Marijuana     Comment: usage in highschool   • Sexual activity: Not Currently     Partners: Male     Birth control/protection: Condom Male     Comment: last SA one year ago     Current Outpatient Medications on File Prior to Visit   Medication Sig   • albuterol (Ventolin HFA) 90 mcg/act inhaler Inhale 2 puffs every 6 (six) hours as needed for wheezing   • ergocalciferol (VITAMIN D2) 50,000 units TAKE 1 CAPSULE BY MOUTH ONE TIME PER WEEK   • FLUoxetine (PROzac) 40 MG capsule TAKE 1 CAPSULE BY MOUTH EVERY DAY   • lisinopril-hydrochlorothiazide (PRINZIDE,ZESTORETIC) 20-25 MG per tablet TAKE 1 TABLET BY MOUTH EVERY DAY   • ondansetron (ZOFRAN) 4 mg tablet Take 1 tablet (4 mg total) by mouth every 8 (eight) hours as needed for nausea or vomiting   • pantoprazole (PROTONIX) 40 mg tablet TAKE 1 TABLET BY MOUTH DAILY BEFORE BREAKFAST   • phentermine (ADIPEX-P) 37.5 MG tablet Take 1 tablet (37.5 mg total) by mouth in the morning   • rosuvastatin (CRESTOR) 5 mg tablet TAKE 1 TABLET (5 MG TOTAL) BY MOUTH DAILY.   • Zepbound 15 MG/0.5ML auto-injector INJECT 15MG SUBCUTANEOUSLY (UNDER THE SKIN) ONCE WEEKLY (Patient not taking: Reported on 3/3/2025)     Allergies   Allergen Reactions   • Shellfish Allergy - Food Allergy Throat Swelling     Immunization History   Administered Date(s) Administered   • COVID-19 PFIZER VACCINE 0.3 ML IM 2021, 2021, 2022   • COVID-19 Pfizer vac (Riley-sucrose, gray cap) 12 yr+ IM 2022   • Fluzone Split Quad 0.25 mL 10/05/2017   • INFLUENZA 10/05/2017     Objective   /82 (BP Location: Left arm, Patient Position:  "Sitting, Cuff Size: Standard)   Pulse 68   Temp (!) 97 °F (36.1 °C) (Tympanic)   Resp 16   Ht 5' 5\" (1.651 m)   Wt (!) 142 kg (313 lb 6.4 oz)   LMP 02/12/2024   SpO2 98%   BMI 52.15 kg/m²     Physical Exam  Vitals and nursing note reviewed.   Constitutional:       Appearance: She is well-developed.   HENT:      Head: Normocephalic and atraumatic.   Eyes:      Pupils: Pupils are equal, round, and reactive to light.   Cardiovascular:      Rate and Rhythm: Normal rate and regular rhythm.      Heart sounds: Normal heart sounds.   Pulmonary:      Effort: Pulmonary effort is normal.      Breath sounds: Normal breath sounds.   Abdominal:      General: Bowel sounds are normal.      Palpations: Abdomen is soft.   Musculoskeletal:      Cervical back: Normal range of motion and neck supple.   Lymphadenopathy:      Cervical: No cervical adenopathy.   Skin:     General: Skin is warm.   Neurological:      Mental Status: She is alert and oriented to person, place, and time.         "

## 2025-03-31 NOTE — ASSESSMENT & PLAN NOTE
Not improving.  We will continue on phentermine 37.5 mg daily.  She was given samples for Ozempic to do 0.25 mg weekly and then 0.5 mg weekly.  Discussed with possible side effect.  Discussed about low-carb diet and regular exercise.  Come back in 4 to 6 weeks.

## 2025-04-03 DIAGNOSIS — E66.813 CLASS 3 SEVERE OBESITY DUE TO EXCESS CALORIES WITH SERIOUS COMORBIDITY AND BODY MASS INDEX (BMI) OF 50.0 TO 59.9 IN ADULT: ICD-10-CM

## 2025-04-08 RX ORDER — PHENTERMINE HYDROCHLORIDE 37.5 MG/1
37.5 TABLET ORAL EVERY MORNING
Qty: 30 TABLET | Refills: 0 | Status: SHIPPED | OUTPATIENT
Start: 2025-04-08

## 2025-05-08 DIAGNOSIS — E66.813 CLASS 3 SEVERE OBESITY DUE TO EXCESS CALORIES WITH SERIOUS COMORBIDITY AND BODY MASS INDEX (BMI) OF 50.0 TO 59.9 IN ADULT: ICD-10-CM

## 2025-05-09 NOTE — TELEPHONE ENCOUNTER
Pharmacy requesting refill on Phentermine   Last seen 3/31/25  Has appt 5/19/25     1 ANTONELLA MALONE 1980 F [id number] [place]      Search Criteria  Name Date of Birth Date Range  Antonella Malone 1980 05- To 05-  Requester Name Requested Date  MONROECHHAYA GENTILE 05- 11:27:35 ([facility])  Summary  Prescriptions  2  Prescribers  1  Pharmacies  1  Drug Classes  Benzodiazepines  0  Stimulants  2  Opioids  0  Muscle Relaxants  0  Opioid Dosage  Total MME for Active Prescriptions  0    Average MME  0.00         Prescriptions  Notifications    Prescribers  Pharmacies  MME Graph    Show All     [place]   Drug Categories:      Stimulants     Show  10  entries  Search:  Patient Id Prescription # Sold Filled Written Drug Label Qty Days Strength MME* Prescriber Pharmacy Payment REFILL #/Auth State Detail  1 [id number] 04/08/2025 04/08/2025 04/08/2025 Phentermine Hcl (Tablet) 30.0 30 37.5 MG FAINA GAY) [employer]. Commercial [place] 0 / 0 [place]   1 [id number] 03/03/2025 03/03/2025 03/03/2025 Phentermine Hcl (Tablet) 30.0 30 37.5 MG FAINA GAY) [employer] Private Pay

## 2025-05-10 RX ORDER — PHENTERMINE HYDROCHLORIDE 37.5 MG/1
37.5 TABLET ORAL EVERY MORNING
Qty: 30 TABLET | Refills: 0 | Status: SHIPPED | OUTPATIENT
Start: 2025-05-10

## 2025-05-19 ENCOUNTER — OFFICE VISIT (OUTPATIENT)
Dept: FAMILY MEDICINE CLINIC | Facility: CLINIC | Age: 45
End: 2025-05-19
Payer: COMMERCIAL

## 2025-05-19 VITALS
HEIGHT: 65 IN | OXYGEN SATURATION: 97 % | DIASTOLIC BLOOD PRESSURE: 78 MMHG | BODY MASS INDEX: 48.82 KG/M2 | WEIGHT: 293 LBS | TEMPERATURE: 97.4 F | HEART RATE: 69 BPM | SYSTOLIC BLOOD PRESSURE: 132 MMHG | RESPIRATION RATE: 16 BRPM

## 2025-05-19 DIAGNOSIS — I10 ESSENTIAL HYPERTENSION: ICD-10-CM

## 2025-05-19 DIAGNOSIS — E66.813 CLASS 3 SEVERE OBESITY DUE TO EXCESS CALORIES WITH SERIOUS COMORBIDITY AND BODY MASS INDEX (BMI) OF 50.0 TO 59.9 IN ADULT: Primary | ICD-10-CM

## 2025-05-19 PROCEDURE — 99213 OFFICE O/P EST LOW 20 MIN: CPT | Performed by: FAMILY MEDICINE

## 2025-05-19 RX ORDER — PHENTERMINE HYDROCHLORIDE 37.5 MG/1
37.5 TABLET ORAL EVERY MORNING
Qty: 90 TABLET | Refills: 0 | Status: SHIPPED | OUTPATIENT
Start: 2025-05-19

## 2025-05-23 ENCOUNTER — TELEPHONE (OUTPATIENT)
Age: 45
End: 2025-05-23

## 2025-05-23 ENCOUNTER — PREP FOR PROCEDURE (OUTPATIENT)
Age: 45
End: 2025-05-23

## 2025-05-23 DIAGNOSIS — Z12.11 SCREENING FOR COLON CANCER: Primary | ICD-10-CM

## 2025-05-23 NOTE — LETTER
May 23, 2025     Antonellaedward Burkse    Patient: Antonella Irving   YOB: 1980   Date of Visit: 5/23/2025       Dear Demetrice Berman please find prep instructions for your scheduled colonoscopy on 6/17/25 at [facility], [address], [place], [address].    Please contact us at [phone] with any questions.         Sincerely,        [facility]

## 2025-05-23 NOTE — TELEPHONE ENCOUNTER
05/23/25  Screened by: Stacy Antoine MA    Referring Provider Dr. Ruddy Taylor    Pre- Screening:     There is no height or weight on file to calculate BMI.  Has patient been referred for a routine screening Colonoscopy? yes  Is the patient between 45-75 years old? yes      Previous Colonoscopy no   If yes:    Date:     Facility:     Reason:         Does the patient want to see a Gastroenterologist prior to their procedure OR are they having any GI symptoms? no    Has the patient been hospitalized or had abdominal surgery in the past 6 months? no    Does the patient use supplemental oxygen? no    Does the patient take Coumadin, Lovenox, Plavix, Elliquis, Xarelto, or other blood thinning medication? no    Has the patient had a stroke, cardiac event, or stent placed in the past year? no      If patient is between 45yrs - 49yrs, please advise patient that we will have to confirm benefits & coverage with their insurance company for a routine screening colonoscopy.

## 2025-05-25 NOTE — ASSESSMENT & PLAN NOTE
Not well-controlled.  She was given prescription for phentermine 37.5 mg daily.  She was given sample of Mounjaro.  Discussed about possible side effects.  Discussed about low-carb diet and regular exercise.  Come back in 1 month.  Orders:  •  phentermine (ADIPEX-P) 37.5 MG tablet; Take 1 tablet (37.5 mg total) by mouth every morning

## 2025-05-25 NOTE — PROGRESS NOTES
Name: Antonella Irving      : 1980      MRN: [id number]  Encounter Provider: Ruddy Taylor MD  Encounter Date: 2025   Encounter department: [facility]  :  Assessment & Plan  Class 3 severe obesity due to excess calories with serious comorbidity and body mass index (BMI) of 50.0 to 59.9 in adult  Not well-controlled.  She was given prescription for phentermine 37.5 mg daily.  She was given sample of Mounjaro.  Discussed about possible side effects.  Discussed about low-carb diet and regular exercise.  Come back in 1 month.  Orders:  •  phentermine (ADIPEX-P) 37.5 MG tablet; Take 1 tablet (37.5 mg total) by mouth every morning    Essential hypertension  Well-controlled on Prinzide  Continue to monitor blood pressure at home.               History of Present Illness   She is here today for follow-up multiple medical problems.  She has been trying to watch her diet and exercise to lose weight but has not been successful.  Her insurance does not cover GLP-1.  She is requesting medication to help with her weight loss.  She denies any other complaint.      Review of Systems   Constitutional:  Negative for chills and fever.   HENT:  Negative for trouble swallowing.    Eyes:  Negative for visual disturbance.   Respiratory:  Negative for cough and shortness of breath.    Cardiovascular:  Negative for chest pain, palpitations and leg swelling.   Gastrointestinal:  Negative for abdominal pain, constipation and diarrhea.   Endocrine: Negative for cold intolerance and heat intolerance.   Genitourinary:  Negative for difficulty urinating and dysuria.   Musculoskeletal:  Negative for gait problem.   Skin:  Negative for rash.   Neurological:  Negative for dizziness, tremors, seizures and headaches.   Hematological:  Negative for adenopathy.   Psychiatric/Behavioral:  Negative for behavioral problems.        Objective   /78 (BP Location: Left arm, Patient Position: Sitting, Cuff Size:  "Large)   Pulse 69   Temp (!) 97.4 °F (36.3 °C) (Tympanic)   Resp 16   Ht 5' 5\" (1.651 m)   Wt (!) 142 kg (312 lb)   LMP 02/12/2024   SpO2 97%   BMI 51.92 kg/m²      Physical Exam  Vitals and nursing note reviewed.   Constitutional:       Appearance: She is well-developed.   HENT:      Head: Normocephalic and atraumatic.     Eyes:      Pupils: Pupils are equal, round, and reactive to light.       Cardiovascular:      Rate and Rhythm: Normal rate and regular rhythm.      Heart sounds: Normal heart sounds.   Pulmonary:      Effort: Pulmonary effort is normal.      Breath sounds: Normal breath sounds.   Abdominal:      General: Bowel sounds are normal.      Palpations: Abdomen is soft.     Musculoskeletal:      Cervical back: Normal range of motion and neck supple.   Lymphadenopathy:      Cervical: No cervical adenopathy.     Skin:     General: Skin is warm.     Neurological:      Mental Status: She is alert and oriented to person, place, and time.         "

## 2025-06-12 ENCOUNTER — OFFICE VISIT (OUTPATIENT)
Dept: SLEEP CENTER | Facility: CLINIC | Age: 45
End: 2025-06-12
Payer: COMMERCIAL

## 2025-06-12 VITALS
WEIGHT: 293 LBS | SYSTOLIC BLOOD PRESSURE: 124 MMHG | BODY MASS INDEX: 48.82 KG/M2 | HEIGHT: 65 IN | DIASTOLIC BLOOD PRESSURE: 80 MMHG

## 2025-06-12 DIAGNOSIS — D50.0 IRON DEFICIENCY ANEMIA DUE TO CHRONIC BLOOD LOSS: ICD-10-CM

## 2025-06-12 DIAGNOSIS — G47.19 EXCESSIVE DAYTIME SLEEPINESS: ICD-10-CM

## 2025-06-12 DIAGNOSIS — R06.83 SNORING: ICD-10-CM

## 2025-06-12 DIAGNOSIS — Z12.31 ENCOUNTER FOR SCREENING MAMMOGRAM FOR MALIGNANT NEOPLASM OF BREAST: ICD-10-CM

## 2025-06-12 DIAGNOSIS — R29.818 SUSPECTED SLEEP APNEA: Primary | ICD-10-CM

## 2025-06-12 DIAGNOSIS — E66.813 CLASS 3 SEVERE OBESITY DUE TO EXCESS CALORIES WITH SERIOUS COMORBIDITY AND BODY MASS INDEX (BMI) OF 50.0 TO 59.9 IN ADULT: ICD-10-CM

## 2025-06-12 DIAGNOSIS — G25.81 RLS (RESTLESS LEGS SYNDROME): ICD-10-CM

## 2025-06-12 PROBLEM — G47.09 OTHER INSOMNIA: Status: ACTIVE | Noted: 2024-06-21

## 2025-06-12 PROCEDURE — 99204 OFFICE O/P NEW MOD 45 MIN: CPT | Performed by: INTERNAL MEDICINE

## 2025-06-12 NOTE — ASSESSMENT & PLAN NOTE
History of snoring, excessive daytime sleepiness and unrefreshing sleep  BMI of 51.92  Hypertension  She is amenable to try CPAP therapy  I reviewed with the patient the pathophysiology of obstructive sleep apnea (MILTON), explaining that the condition involves intermittent blockage of the upper airway during sleep, which leads to brief pauses in breathing and drops in oxygen levels. These frequent episodes of hypoxia and disrupted sleep trigger an increase in sympathetic nervous system activity, which raises blood pressure and stresses the cardiovascular system. I emphasized that untreated MILTON is strongly associated with a range of serious health consequences. Specifically, chronic untreated MILTON significantly raises the risk of developing hypertension, atrial fibrillation (A-fib), and heart failure due to the prolonged strain on the heart and blood vessels. I discussed how untreated MILTON is also a major risk factor for stroke, as intermittent hypoxia and elevated blood pressure can lead to the development of atherosclerosis and the formation of blood clots. Furthermore, I explained the growing body of evidence suggesting that untreated MILTON is linked to cognitive decline, including an increased risk of early-onset dementia, particularly Alzheimer's disease. This is thought to result from the effects of chronic sleep disruption and hypoxia on brain function. I stressed that effective management of MILTON, through interventions such as CPAP therapy, weight loss, or other treatments, is essential to reducing these risks. The patient was advised on the importance of timely diagnosis and treatment to improve both sleep quality and reduce the risk of long-term cardiovascular and cognitive complications.  Ordered a home sleep study  Orders:    Home Sleep Study; Future

## 2025-06-12 NOTE — PATIENT INSTRUCTIONS
"Patient Education     Sleep apnea in adults   The Basics   Written by the doctors and editors at [facility]   What is sleep apnea? -- Sleep apnea is a condition that makes you stop breathing for short periods while you are asleep. There are 2 types of sleep apnea. One is called \"obstructive sleep apnea.\" The other is called \"central sleep apnea.\"  In obstructive sleep apnea, you stop breathing because your throat narrows or closes (figure 1). In central sleep apnea, you stop breathing because your brain does not send the right signals to your muscles to make you breathe. When people talk about sleep apnea, they are usually referring to obstructive sleep apnea, which is what this article is about.  People with sleep apnea do not know that they stop breathing when they are asleep. But they do sometimes wake up startled or gasping for breath. They also often hear from loved ones that they snore.  What are the symptoms of sleep apnea? -- The main symptoms of sleep apnea are loud snoring, tiredness, and daytime sleepiness. Other symptoms can include:   Restless sleep   Waking up choking or gasping   Morning headaches, dry mouth, or sore throat   Waking up often to urinate   Waking up feeling unrested or groggy   Trouble thinking clearly or remembering things  Some people with sleep apnea don't have symptoms, or don't realize that they have them.  Should I see a doctor or nurse? -- Yes. If you think that you might have sleep apnea, see your doctor.  Is there a test for sleep apnea? -- Yes. First, your doctor or nurse will ask about your symptoms. If you have a bed partner, they might also ask that person if you snore or gasp in your sleep. If the doctor or nurse suspects that you have sleep apnea, they might send you for a \"sleep study.\"  Sleep studies can sometimes be done at home, but they are usually done in a sleep lab. For the study, you spend the night in the lab, and you are hooked up to different machines that " "monitor your heart rate, breathing, and other body functions. The results of the test tell your doctor or nurse if you have the disorder.  Is there anything I can do on my own to help my sleep apnea? -- Yes. Some things that might help:   Try to avoid sleeping on your back, if possible. This might help some people.   Lose weight, if you have excess body weight.   Get regular physical activity. This might help you lose weight. But even if it doesn't, being active is good for your health.   Avoid alcohol, especially in the evening. Alcohol can make sleep apnea worse.  How is sleep apnea treated? -- Treatment can include:   Weight loss - As mentioned above, weight loss can help if you have excess weight or obesity. But losing weight can be challenging, and it takes time to lose enough weight to help with your sleep apnea. Most people need other treatment while they work on losing weight.   CPAP - The most effective treatment for sleep apnea is a device that keeps your airway open while you sleep. Treatment with this device is called \"continuous positive airway pressure\" (\"CPAP\"). People getting CPAP wear a face mask at night that keeps them breathing (figure 2).  If your doctor or nurse recommends a CPAP machine, be patient about using it. The mask might seem uncomfortable to wear at first, and the machine might seem noisy, but using the machine can really help you. People with sleep apnea who use a CPAP machine feel more rested and generally feel better.  If CPAP does not work, your doctor might suggest other treatment. Options might include:   An oral device - This is a device that you wear in your mouth. It is called an \"oral appliance\" or \"mandibular advancement device.\" It helps keep your airway open while you sleep.   Hypoglossal nerve stimulation - This involves a procedure to implant a small device into your chest. The device has a wire that connects to the nerve under your tongue. While you are sleeping, it " sends an electrical signal that causes the tongue to push forward. This helps open up your airway.   Surgery to widen your airway - This might involve removing your tonsils or other tissue that blocks the airway.  Is sleep apnea dangerous? -- It can be. Risks include:   Accidents - People with sleep apnea do not get good-quality sleep, so they are often tired and not alert. This puts them at risk for car accidents and other types of accidents.   Other health problems - Studies show that people with sleep apnea are more likely than others to have high blood pressure, heart attacks, and other serious heart problems. Some people also have mood changes or depression.  In people with severe sleep apnea, getting treated (for example, with CPAP) can help lower these risks.  All topics are updated as new evidence becomes available and our peer review process is complete.  This topic retrieved from [employer] on: Feb 28, 2024.  Topic 46398 Version 18.0  Release: 32.2.4 - C32.58  © 2024 UpToDate, Inc. and/or its affiliates. All rights reserved.  figure 1: Airway in a person with sleep apnea     Normally, when a person sleeps, the airway remains open, and air can pass from the nose and mouth to the lungs. In a person with sleep apnea, parts of the throat and mouth drop into the airway and block off the flow of air. This can cause loud snoring and interrupt breathing for short periods.  Graphic 63796 Version 6.0  figure 2: Continuous positive airway pressure (CPAP) for sleep apnea     The CPAP mask gently blows air into your nose while you sleep. It puts just enough pressure on your airway to keep it from closing. The mask in this picture fits over just the nose. Other CPAP devices have masks that fit over the nose and mouth.  Graphic 24101 Version 5.0  Consumer Information Use and Disclaimer   Disclaimer: This generalized information is a limited summary of diagnosis, treatment, and/or medication information. It is not meant to  be comprehensive and should be used as a tool to help the user understand and/or assess potential diagnostic and treatment options. It does NOT include all information about conditions, treatments, medications, side effects, or risks that may apply to a specific patient. It is not intended to be medical advice or a substitute for the medical advice, diagnosis, or treatment of a health care provider based on the health care provider's examination and assessment of a patient's specific and unique circumstances. Patients must speak with a health care provider for complete information about their health, medical questions, and treatment options, including any risks or benefits regarding use of medications. This information does not endorse any treatments or medications as safe, effective, or approved for treating a specific patient. UpToDate, Inc. and its affiliates disclaim any warranty or liability relating to this information or the use thereof.The use of this information is governed by the Terms of Use, available at https://www.woltersC2cubeuwer.com/en/know/clinical-effectiveness-terms. 2024© UpToDate, Inc. and its affiliates and/or licensors. All rights reserved.  Copyright   © 2024 UpToDate, Inc. and/or its affiliates. All rights reserved.

## 2025-06-12 NOTE — PROGRESS NOTES
Name: Antonella Irving      : 1980      MRN: [id number]  Encounter Provider: Leo Oliveros MD  Encounter Date: 2025   Encounter department: [facility] SLEEP MEDICINE [place]  :  Assessment & Plan  Suspected sleep apnea  History of snoring, excessive daytime sleepiness and unrefreshing sleep  BMI of 51.92  Hypertension  She is amenable to try CPAP therapy  I reviewed with the patient the pathophysiology of obstructive sleep apnea (MILTON), explaining that the condition involves intermittent blockage of the upper airway during sleep, which leads to brief pauses in breathing and drops in oxygen levels. These frequent episodes of hypoxia and disrupted sleep trigger an increase in sympathetic nervous system activity, which raises blood pressure and stresses the cardiovascular system. I emphasized that untreated MILTON is strongly associated with a range of serious health consequences. Specifically, chronic untreated MILTON significantly raises the risk of developing hypertension, atrial fibrillation (A-fib), and heart failure due to the prolonged strain on the heart and blood vessels. I discussed how untreated MILTON is also a major risk factor for stroke, as intermittent hypoxia and elevated blood pressure can lead to the development of atherosclerosis and the formation of blood clots. Furthermore, I explained the growing body of evidence suggesting that untreated MILTON is linked to cognitive decline, including an increased risk of early-onset dementia, particularly Alzheimer's disease. This is thought to result from the effects of chronic sleep disruption and hypoxia on brain function. I stressed that effective management of MILTON, through interventions such as CPAP therapy, weight loss, or other treatments, is essential to reducing these risks. The patient was advised on the importance of timely diagnosis and treatment to improve both sleep quality and reduce the risk of long-term cardiovascular and cognitive  complications.  Ordered a home sleep study  Orders:    Home Sleep Study; Future    Excessive daytime sleepiness  Will treat even if mild MILTON  Orders:    Home Sleep Study; Future    Snoring  Untreated MILTON  Orders:    Home Sleep Study; Future    Class 3 severe obesity due to excess calories with serious comorbidity and body mass index (BMI) of 50.0 to 59.9 in adult  BMI of 51.92, noted she would benefit from weight loss  Orders:    Home Sleep Study; Future    Iron deficiency anemia due to chronic blood loss  Most likely precipitating RLS symptoms  Orders:    Iron, TIBC and Ferritin Panel; Future    RLS (restless legs syndrome)  Check iron levels  Orders:    Iron, TIBC and Ferritin Panel; Future        History of Present Illness      45-year-old with past medical history of endometrial carcinoma, dysfunctional uterine bleeding, obesity, hyperglycemia, chronic snoring and excessive daytime sleepiness with poor sleep quality that has gotten significantly worse thought to be and attributed to possible menopausal symptoms, however, she tried some dose of trazodone that did not help    Sleep routine:  What time do you typically go to bed weekends or days off? 11:00 pm   How long does it take to fall asleep? 1-2 hours   How often is it hard for you to fall asleep? 3 or more times per week   How often do you sleep through the night without waking up? Every night   What time do you wake up for the last time on Weekdays or Workdays? 6:30 am   What time do you wake up for the last time on weekends? 7:00 am   How do you wake up for the final time to start your day? With multiple alarms   How many hours do you sleep on average? Less than 5   Do you typically feel refreshed when you first awaken? Never   Are you sleepy during the day? Always   Do you intentionally try to nap? No, but would if given the opportunity   How often do you doze (fall asleep without intending to)? Never   Do you currently use medication, supplements or  substances to help you fall or stay asleep? No       [place] scale  Sitting and reading: (Patient-Rptd) Slight chance of dozing  Watching TV: (Patient-Rptd) Slight chance of dozing  Sitting, inactive in a public place (e.g. a theatre or a meeting): (Patient-Rptd) Would never doze  As a passenger in a car for an hour without a break: (Patient-Rptd) Would never doze  Lying down to rest in the afternoon when circumstances permit: (Patient-Rptd) Moderate chance of dozing  Sitting and talking to someone: (Patient-Rptd) Would never doze  Sitting quietly after a lunch without alcohol: (Patient-Rptd) Would never doze  In a car, while stopped for a few minutes in traffic: (Patient-Rptd) Slight chance of dozing  Total score: (Patient-Rptd) 5       Pertinent positives/negatives included in HPI and also as noted:     Medical History Reviewed by provider this encounter:  Tobacco  Allergies  Meds  Problems  Med Hx  Surg Hx  Fam Hx     .  Past Medical History   Past Medical History[1]  Past Surgical History[2]  Family History[3]   reports that she quit smoking about 20 years ago. Her smoking use included cigarettes. She has never used smokeless tobacco. She reports that she does not currently use alcohol. She reports that she does not currently use drugs after having used the following drugs: Marijuana. Frequency: 1.00 time per week.  Current Outpatient Medications   Medication Instructions    albuterol (Ventolin HFA) 90 mcg/act inhaler 2 puffs, Inhalation, Every 6 hours PRN    ergocalciferol (VITAMIN D2) 50,000 units TAKE 1 CAPSULE BY MOUTH ONE TIME PER WEEK    FLUoxetine (PROZAC) 40 mg, Oral, Daily    lisinopril-hydrochlorothiazide (PRINZIDE,ZESTORETIC) 20-25 MG per tablet 1 tablet, Oral, Daily    ondansetron (ZOFRAN) 4 mg, Oral, Every 8 hours PRN    pantoprazole (PROTONIX) 40 mg, Oral, Daily before breakfast    phentermine (ADIPEX-P) 37.5 mg, Oral, Every morning    rosuvastatin (CRESTOR) 5 mg, Oral, Daily   Allergies[4]  "  Medications Ordered Prior to Encounter[5]   Social History[6]  Objective   /80   Ht 5' 5\" (1.651 m)   Wt (!) 142 kg (312 lb)   LMP 02/12/2024   BMI 51.92 kg/m²     Neck Circumference: 15  Physical Exam  Constitutional:       Appearance: Normal appearance.   HENT:      Head: Normocephalic and atraumatic.      Nose: No congestion or rhinorrhea.      Mouth/Throat:      Mouth: Mucous membranes are moist.      Pharynx: Oropharynx is clear. No oropharyngeal exudate.     Eyes:      General: No scleral icterus.        Right eye: No discharge.         Left eye: No discharge.      Conjunctiva/sclera: Conjunctivae normal.       Cardiovascular:      Rate and Rhythm: Normal rate and regular rhythm.      Pulses: Normal pulses.      Heart sounds: Normal heart sounds.      No gallop.   Pulmonary:      Effort: Pulmonary effort is normal.      Breath sounds: Normal breath sounds. No stridor. No wheezing or rhonchi.     Musculoskeletal:         General: No swelling or tenderness.      Cervical back: Normal range of motion and neck supple.     Skin:     General: Skin is warm.      Coloration: Skin is not pale.      Findings: No erythema.     Neurological:      General: No focal deficit present.      Mental Status: She is alert and oriented to person, place, and time. Mental status is at baseline.     Psychiatric:         Mood and Affect: Mood normal.         Behavior: Behavior normal.       Visit Vitals  /80   Ht 5' 5\" (1.651 m)   Wt (!) 142 kg (312 lb)   LMP 02/12/2024   BMI 51.92 kg/m²   OB Status Hysterectomy   Smoking Status Former   BSA 2.39 m²           Data  Lab Results   Component Value Date    HGB 13.4 02/27/2025    HCT 41.9 02/27/2025    MCV 88 02/27/2025      Lab Results   Component Value Date    CALCIUM 9.3 02/27/2025    K 4.5 02/27/2025    CO2 29 02/27/2025     02/27/2025    BUN 16 02/27/2025    CREATININE 0.82 02/27/2025     No results found for: \"IRON\", \"TIBC\", \"FERRITIN\"  Lab Results   Component " Value Date    AST 19 02/27/2025    ALT 22 02/27/2025       Administrative Statements   I have spent a total time of 61 minutes in caring for this patient on the day of the visit/encounter including Impressions, Counseling / Coordination of care, Documenting in the medical record, and Reviewing/placing orders in the medical record (including tests, medications, and/or procedures).         [1]   Past Medical History:  Diagnosis Date    Asthma     Depression     Hyperlipemia     Hypertension     Obesity, morbid, BMI 50 or higher (HCC)     Papanicolaou smear of cervix with positive high risk human papilloma virus (HPV) test     12/2023 pap negative, + HPV type 16;  COLPO:    Sleep apnea    [2]   Past Surgical History:  Procedure Laterality Date    HERNIA REPAIR  1999    HYSTERECTOMY  02/27/2024    IR ASPIRATION ONLY  03/12/2024    OOPHORECTOMY Bilateral 02/27/2024    MT INJECTION PROCEDURE LYMPHANGIOGRAPHY  02/27/2024    Procedure: LYMPHOGRAPHY WITH INDOCYANINE GREEN (ICG);  Surgeon: Daria Nuñez MD;  [facility];  Service: Gynecology Oncology    MT LAPS TOTAL HYSTERECT 250 GM/< W/RMVL TUBE/OVARY N/A 02/27/2024    Procedure: HYSTERECTOMY LAPAROSCOPIC TOTAL (LTH) W/ ROBOTICS, BILATERAL SALPINGO-OOPHORECTOMY, LYMPH NODE DISSECTION;  Surgeon: Daria Nuñez MD;  [facility];  Service: Gynecology Oncology   [3]   Family History  Problem Relation Name Age of Onset    Hypertension Mother Jennifer     Diabetes Mother Jennifer         Mellitus    Pancreatic cancer Father Marcial lucero     Diabetes Father Marcial lucero         Mellitus    Coronary artery disease Father Marcial lucero     Cancer Father Marcial lucero         Pancreatic    No Known Problems Sister Delfin     No Known Problems Sister Aspne     No Known Problems Sister Katt     No Known Problems Sister Gallito     No Known Problems Maternal Grandmother      No Known Problems Maternal Grandfather      Breast cancer Paternal Grandmother Katt      Cancer Paternal Grandmother Katt         Breast    No Known Problems Paternal Grandfather      No Known Problems Brother      No Known Problems Brother      No Known Problems Brother      Cervical cancer Maternal Aunt Trini     Cancer Maternal Aunt Trini         cervical    Breast cancer Paternal Aunt Carolina 64    Diabetes Family Close Relative         Mellitus    Colon cancer Neg Hx      Ovarian cancer Neg Hx      Uterine cancer Neg Hx     [4]   Allergies  Allergen Reactions    Shellfish Allergy - Food Allergy Throat Swelling   [5]   Current Outpatient Medications on File Prior to Visit   Medication Sig Dispense Refill    albuterol (Ventolin HFA) 90 mcg/act inhaler Inhale 2 puffs every 6 (six) hours as needed for wheezing 18 g 1    ergocalciferol (VITAMIN D2) 50,000 units TAKE 1 CAPSULE BY MOUTH ONE TIME PER WEEK 12 capsule 1    FLUoxetine (PROzac) 40 MG capsule TAKE 1 CAPSULE BY MOUTH EVERY DAY 90 capsule 1    lisinopril-hydrochlorothiazide (PRINZIDE,ZESTORETIC) 20-25 MG per tablet TAKE 1 TABLET BY MOUTH EVERY DAY (Patient taking differently: Take 1 tablet by mouth daily PRN) 90 tablet 1    ondansetron (ZOFRAN) 4 mg tablet Take 1 tablet (4 mg total) by mouth every 8 (eight) hours as needed for nausea or vomiting (Patient taking differently: Take 4 mg by mouth every 8 (eight) hours as needed for nausea or vomiting PRN) 20 tablet 0    pantoprazole (PROTONIX) 40 mg tablet TAKE 1 TABLET BY MOUTH DAILY BEFORE BREAKFAST 90 tablet 1    phentermine (ADIPEX-P) 37.5 MG tablet Take 1 tablet (37.5 mg total) by mouth every morning 90 tablet 0    rosuvastatin (CRESTOR) 5 mg tablet TAKE 1 TABLET (5 MG TOTAL) BY MOUTH DAILY. 90 tablet 1     No current facility-administered medications on file prior to visit.   [6]   Social History  Tobacco Use    Smoking status: Former     Types: Cigarettes     Quit date: 2005     Years since quittin.4    Smokeless tobacco: Never    Tobacco comments:     10 per day. No passive  smoke exposure   Vaping Use    Vaping status: Never Used   Substance and Sexual Activity    Alcohol use: Not Currently    Drug use: Not Currently     Frequency: 1.0 times per week     Types: Marijuana     Comment: usage in highschool    Sexual activity: Not Currently     Partners: Male     Birth control/protection: Condom Male     Comment: last SA one year ago

## 2025-06-13 ENCOUNTER — HOSPITAL ENCOUNTER (OUTPATIENT)
Dept: SLEEP CENTER | Facility: CLINIC | Age: 45
Discharge: HOME | End: 2025-06-13
Attending: INTERNAL MEDICINE
Payer: COMMERCIAL

## 2025-06-13 DIAGNOSIS — E66.813 CLASS 3 SEVERE OBESITY DUE TO EXCESS CALORIES WITH SERIOUS COMORBIDITY AND BODY MASS INDEX (BMI) OF 50.0 TO 59.9 IN ADULT: ICD-10-CM

## 2025-06-13 DIAGNOSIS — R06.83 SNORING: ICD-10-CM

## 2025-06-13 DIAGNOSIS — R29.818 SUSPECTED SLEEP APNEA: ICD-10-CM

## 2025-06-13 DIAGNOSIS — G47.19 EXCESSIVE DAYTIME SLEEPINESS: ICD-10-CM

## 2025-06-13 PROCEDURE — G0399 HOME SLEEP TEST/TYPE 3 PORTA: HCPCS

## 2025-06-14 NOTE — PROGRESS NOTES
Gynecologic Oncology Postoperative Check Note  5/30/2024    S: Patient reports she is doing well postoperatively. Pain is moderately well controlled with current pain regimen. Voiding spontaneouly. Not yet ambulating. Tolerating crackers and water without nausea or vomiting. Denies chest pain, shortness of breath, dizziness, or other concerns at this time.     O:  Vitals:    05/29/24 2141 05/29/24 2145 05/29/24 2200 05/29/24 2250   BP: 123/83 123/83  110/66   BP Location:       Pulse: 60 69  60   Resp: 13 13  16   Temp:  97.7  F (36.5  C)  97.5  F (36.4  C)   TempSrc:  Axillary  Axillary   SpO2: 100% 100% 100% 100%   Weight:       Height:           Gen: NAD  Cardio: RRR, no murmurs  Resp: CTAB, no wheezing or crackles  Abdomen: soft, appropriately tender, incision c/d/i  Extremities: Non-tender, no LE edema    A: 33 year old POD#0 s/p Laparoscopy, Left Cystectomy. Doing well in the immediate postoperative period. VSS.    # Post-operative state  - Dz: Left Hemorrhagic Cyst  FEN: Regular diet  Pain: Yuniel Tylenol, ibuprofen. PRN oxy. PRN IV Dilaudid; will add on additional atarax for sleep.  Heme: Hgb 9.6> > AM CBC ordered.  CV: NI  Pulm: NI  GI: Yuniel bowel reg. PRN antiemetics, simethincone.  : Voiding spontaneously  ID: Afebrile. S/p pre-op abx.   Endo: NI  Psych/Neuro/MSK: NI  PPX: SCDs, IS     Dispo: Pending postoperative goals    Charlie Mcginnis MD MPH  Obstetrics and Gyncology, PGY-3  May 30, 2024 , 1:49 AM      Home Sleep Study Documentation    HOME STUDY DEVICE: Noxturnal yes                                           Jennifer G3 no      Pre-Sleep Home Study:    Set-up and instructions performed by:  MARY KAY Irene    Technician performed demonstration for Patient: yes    Return demonstration performed by Patient: yes    Written instructions provided to Patient: yes    Patient signed consent form: yes          Post-Sleep Home Study:        Additional comments by Patient: pending    Home Sleep Study Failed:pending    Failure reason: pending    Reported or Detected: pending    Scored by: pending

## 2025-06-14 NOTE — PROGRESS NOTES
Home Sleep Study Documentation    HOME STUDY DEVICE: Noxturnal yes                                           Jennifer G3 no      Pre-Sleep Home Study:    Set-up and instructions performed by:  MARY KAY Irene    Technician performed demonstration for Patient: yes    Return demonstration performed by Patient: yes    Written instructions provided to Patient: yes    Patient signed consent form: yes          Post-Sleep Home Study:    Post Test Questionnaire Data: On the post-study questionnaire, the patient estimated 6 hours of sleep during this test, with 3 awakenings. Please refer to scanned form for additional comments on alcohol use on day of test, medications, and/or activities during awakenings    Additional comments by Patient:  None    Home Sleep Study Failed:no:    Failure reason: N/A    Reported or Detected: N/A    Scored by: MARY KAY Marte

## 2025-06-16 RX ORDER — SODIUM CHLORIDE, SODIUM LACTATE, POTASSIUM CHLORIDE, CALCIUM CHLORIDE 600; 310; 30; 20 MG/100ML; MG/100ML; MG/100ML; MG/100ML
20 INJECTION, SOLUTION INTRAVENOUS CONTINUOUS
Status: CANCELLED | OUTPATIENT
Start: 2025-06-16

## 2025-06-16 RX ORDER — LIDOCAINE HYDROCHLORIDE 10 MG/ML
0.5 INJECTION, SOLUTION EPIDURAL; INFILTRATION; INTRACAUDAL; PERINEURAL ONCE AS NEEDED
Status: CANCELLED | OUTPATIENT
Start: 2025-06-16

## 2025-06-17 ENCOUNTER — ANESTHESIA EVENT (OUTPATIENT)
Dept: GASTROENTEROLOGY | Facility: HOSPITAL | Age: 45
End: 2025-06-17
Payer: COMMERCIAL

## 2025-06-17 ENCOUNTER — HOSPITAL ENCOUNTER (OUTPATIENT)
Dept: GASTROENTEROLOGY | Facility: HOSPITAL | Age: 45
Setting detail: OUTPATIENT SURGERY
Discharge: HOME | End: 2025-06-17
Attending: INTERNAL MEDICINE
Payer: COMMERCIAL

## 2025-06-17 ENCOUNTER — ANESTHESIA (OUTPATIENT)
Dept: GASTROENTEROLOGY | Facility: HOSPITAL | Age: 45
End: 2025-06-17
Payer: COMMERCIAL

## 2025-06-17 VITALS
RESPIRATION RATE: 16 BRPM | SYSTOLIC BLOOD PRESSURE: 133 MMHG | TEMPERATURE: 97.1 F | OXYGEN SATURATION: 98 % | DIASTOLIC BLOOD PRESSURE: 68 MMHG | HEART RATE: 70 BPM

## 2025-06-17 DIAGNOSIS — Z12.11 SCREENING FOR COLON CANCER: ICD-10-CM

## 2025-06-17 PROBLEM — G47.33 OSA (OBSTRUCTIVE SLEEP APNEA): Status: ACTIVE | Noted: 2025-06-17

## 2025-06-17 PROCEDURE — 88305 TISSUE EXAM BY PATHOLOGIST: CPT | Performed by: PATHOLOGY

## 2025-06-17 RX ORDER — LIDOCAINE HYDROCHLORIDE 10 MG/ML
0.5 INJECTION, SOLUTION EPIDURAL; INFILTRATION; INTRACAUDAL; PERINEURAL ONCE AS NEEDED
Status: DISCONTINUED | OUTPATIENT
Start: 2025-06-17 | End: 2025-06-21 | Stop reason: HOSPADM

## 2025-06-17 RX ORDER — SODIUM CHLORIDE, SODIUM LACTATE, POTASSIUM CHLORIDE, CALCIUM CHLORIDE 600; 310; 30; 20 MG/100ML; MG/100ML; MG/100ML; MG/100ML
20 INJECTION, SOLUTION INTRAVENOUS CONTINUOUS
Status: DISCONTINUED | OUTPATIENT
Start: 2025-06-17 | End: 2025-06-21 | Stop reason: HOSPADM

## 2025-06-17 RX ORDER — LIDOCAINE HYDROCHLORIDE 20 MG/ML
INJECTION, SOLUTION EPIDURAL; INFILTRATION; INTRACAUDAL; PERINEURAL AS NEEDED
Status: DISCONTINUED | OUTPATIENT
Start: 2025-06-17 | End: 2025-06-17

## 2025-06-17 RX ORDER — PROPOFOL 10 MG/ML
INJECTION, EMULSION INTRAVENOUS AS NEEDED
Status: DISCONTINUED | OUTPATIENT
Start: 2025-06-17 | End: 2025-06-17

## 2025-06-17 RX ADMIN — SODIUM CHLORIDE, SODIUM LACTATE, POTASSIUM CHLORIDE, AND CALCIUM CHLORIDE 20 ML/HR: .6; .31; .03; .02 INJECTION, SOLUTION INTRAVENOUS at 13:04

## 2025-06-17 RX ADMIN — PROPOFOL 100 MG: 10 INJECTION, EMULSION INTRAVENOUS at 13:30

## 2025-06-17 RX ADMIN — PROPOFOL 100 MG: 10 INJECTION, EMULSION INTRAVENOUS at 13:34

## 2025-06-17 RX ADMIN — LIDOCAINE HYDROCHLORIDE 60 MG: 20 INJECTION, SOLUTION EPIDURAL; INFILTRATION; INTRACAUDAL at 13:29

## 2025-06-17 RX ADMIN — PROPOFOL 100 MG: 10 INJECTION, EMULSION INTRAVENOUS at 13:32

## 2025-06-17 RX ADMIN — PROPOFOL 50 MG: 10 INJECTION, EMULSION INTRAVENOUS at 13:42

## 2025-06-17 NOTE — ANESTHESIA PREPROCEDURE EVALUATION
Janice Santiago presents today for   Chief Complaint   Patient presents with   [facility]     with chills at night     Cough    Fatigue       Is someone accompanying this pt? no  Is the patient using any DME equipment during OV? no    Travel and Exposure Screening was performed during check in or rooming process Yes        Depression Screening:  3 most recent PHQ Screens 5/20/2020   Little interest or pleasure in doing things Not at all   Feeling down, depressed, irritable, or hopeless Not at all   Total Score PHQ 2 0       Fall Risk  No flowsheet data found.     This Visit Test  Results for orders placed or performed in visit on 05/20/20   AMB POC RAPID STREP A   Result Value Ref Range    VALID INTERNAL CONTROL POC Yes     Group A Strep Ag Negative Negative   AMB POC RAPID INFLUENZA TEST   Result Value Ref Range    VALID INTERNAL CONTROL POC Yes     Influenza A Ag POC Negative Negative Pos/Neg    Influenza B Ag POC Negative Negative Pos/Neg Procedure:  COLONOSCOPY    Relevant Problems   CARDIO   (+) Essential hypertension   (+) Other hyperlipidemia      GI/HEPATIC   (+) Gastroesophageal reflux disease without esophagitis      GYN   (+) Endometrial carcinoma (HCC)      HEMATOLOGY   (+) Iron deficiency anemia      MUSCULOSKELETAL   (+) Chronic left-sided low back pain without sciatica   (+) Mid back pain   (+) Shoulder impingement syndrome, left      NEURO/PSYCH   (+) Anxiety   (+) Chronic left-sided low back pain without sciatica   (+) Mild episode of recurrent major depressive disorder (HCC)   (+) Severe major depressive disorder (HCC)      PULMONARY   (+) Acute laryngitis   (+) Asthma   (+) MILTON (obstructive sleep apnea)        Physical Exam    Airway     Mallampati score: II  TM Distance: >3 FB  Neck ROM: full      Cardiovascular  Rhythm: regular, Rate: normal    Dental       Pulmonary   Breath sounds clear to auscultation    Neurological    She appears oriented x3.      Other Findings  post-pubertal.      Anesthesia Plan  ASA Score- 2     Anesthesia Type- IV sedation with anesthesia with ASA Monitors.         Additional Monitors:     Airway Plan:            Plan Factors-Exercise tolerance (METS): >4 METS.    Chart reviewed.    Patient summary reviewed.    Patient is not a current smoker.              Induction-     Postoperative Plan- .   Monitoring Plan - Monitoring plan - standard ASA monitoring      Perioperative Resuscitation Plan - Level 1 - Full Code.       Informed Consent- Anesthetic plan and risks discussed with patient.        NPO Status:  Vitals Value Taken Time   Date of last liquid 06/17/25 06/17/25 12:26   Time of last liquid 0800 06/17/25 12:26   Date of last solid 06/15/25 06/17/25 12:26   Time of last solid 2100 06/17/25 12:26

## 2025-06-17 NOTE — H&P
History and Physical - [facility]  Antonella Irving 45 y.o. female MRN: [id number]                  HPI: Antonella Irving is a 45 y.o. year old female who presents for colonoscopy for CRC screening.      REVIEW OF SYSTEMS: Per the HPI, and otherwise unremarkable.    Historical Information   Past Medical History[1]  Past Surgical History[2]  Social History   Social History     Substance and Sexual Activity   Alcohol Use Not Currently     Social History     Substance and Sexual Activity   Drug Use Not Currently    Frequency: 1.0 times per week    Types: Marijuana    Comment: usage in highschool     Tobacco Use History[3]  Family History[4]    Meds/Allergies     Current Medications[5]    Allergies[6]    Objective     /100   Pulse 69   Temp (!) 97.3 °F (36.3 °C)   Resp 16   LMP 02/12/2024   SpO2 100%       PHYSICAL EXAM    Gen: NAD  Head: NCAT  CV: RRR  CHEST: Clear  ABD: soft, NT/ND  EXT: no edema      ASSESSMENT/PLAN:  This is a 45 y.o. year old female here for colonoscopy, and she is stable and optimized for her procedure. We discussed potential adverse events related with the procedure. They verbalized their understanding and are in agreement with proceeding with the procedure.            [1]   Past Medical History:  Diagnosis Date    Asthma     Depression     Hyperlipemia     Hypertension     Obesity, morbid, BMI 50 or higher (HCC)     Papanicolaou smear of cervix with positive high risk human papilloma virus (HPV) test     12/2023 pap negative, + HPV type 16;  COLPO:    Sleep apnea    [2]   Past Surgical History:  Procedure Laterality Date    HERNIA REPAIR  1999    HYSTERECTOMY  02/27/2024    IR ASPIRATION ONLY  03/12/2024    OOPHORECTOMY Bilateral 02/27/2024    MO INJECTION PROCEDURE LYMPHANGIOGRAPHY  02/27/2024    Procedure: LYMPHOGRAPHY WITH INDOCYANINE GREEN (ICG);  Surgeon: Daria Nuñez MD;  [facility];  Service: Gynecology Oncology    MO LAPS TOTAL HYSTERECT 250 GM/<  W/RMVL TUBE/OVARY N/A 2024    Procedure: HYSTERECTOMY LAPAROSCOPIC TOTAL (LTH) W/ ROBOTICS, BILATERAL SALPINGO-OOPHORECTOMY, LYMPH NODE DISSECTION;  Surgeon: Daria Nuñez MD;  [facility];  Service: Gynecology Oncology   [3]   Social History  Tobacco Use   Smoking Status Former    Current packs/day: 0.00    Types: Cigarettes    Quit date: 2005    Years since quittin.4   Smokeless Tobacco Never   Tobacco Comments    10 per day. No passive smoke exposure   [4]   Family History  Problem Relation Name Age of Onset    Hypertension Mother Jennifer     Diabetes Mother Jennifer         Mellitus    Pancreatic cancer Father Marcial lucero     Diabetes Father Marcial lucero         Mellitus    Coronary artery disease Father Marcial lucero     Cancer Father Marcial lucero         Pancreatic    No Known Problems Sister Delfin     No Known Problems Sister Aspen     No Known Problems Sister Katt     No Known Problems Sister Gallito     No Known Problems Maternal Grandmother      No Known Problems Maternal Grandfather      Breast cancer Paternal Grandmother Katt     Cancer Paternal Grandmother Katt         Breast    No Known Problems Paternal Grandfather      No Known Problems Brother      No Known Problems Brother      No Known Problems Brother      Cervical cancer Maternal Aunt Trini     Cancer Maternal Aunt Trini         cervical    Breast cancer Paternal Aunt Carolina 64    Diabetes Family Close Relative         Mellitus    Colon cancer Neg Hx      Ovarian cancer Neg Hx      Uterine cancer Neg Hx     [5]   Current Outpatient Medications:     ergocalciferol (VITAMIN D2) 50,000 units    FLUoxetine (PROzac) 40 MG capsule    phentermine (ADIPEX-P) 37.5 MG tablet    rosuvastatin (CRESTOR) 5 mg tablet    albuterol (Ventolin HFA) 90 mcg/act inhaler    lisinopril-hydrochlorothiazide (PRINZIDE,ZESTORETIC) 20-25 MG per tablet    ondansetron (ZOFRAN) 4 mg tablet    pantoprazole (PROTONIX) 40 mg  tablet    Current Facility-Administered Medications:     lactated ringers infusion, 20 mL/hr, Intravenous, Continuous, 20 mL/hr at 06/17/25 1304    lidocaine (PF) (XYLOCAINE-MPF) 1 % injection 0.5 mL, 0.5 mL, Infiltration, Once PRN  [6]   Allergies  Allergen Reactions    Shellfish Allergy - Food Allergy Throat Swelling

## 2025-06-19 PROCEDURE — 88305 TISSUE EXAM BY PATHOLOGIST: CPT | Performed by: PATHOLOGY

## 2025-06-20 DIAGNOSIS — G47.33 OSA (OBSTRUCTIVE SLEEP APNEA): Primary | ICD-10-CM

## 2025-06-20 PROCEDURE — 95806 SLEEP STUDY UNATT&RESP EFFT: CPT | Performed by: INTERNAL MEDICINE

## 2025-06-22 DIAGNOSIS — E55.9 VITAMIN D DEFICIENCY: ICD-10-CM

## 2025-06-23 RX ORDER — ERGOCALCIFEROL 1.25 MG/1
CAPSULE, LIQUID FILLED ORAL
Qty: 12 CAPSULE | Refills: 1 | Status: SHIPPED | OUTPATIENT
Start: 2025-06-23

## 2025-06-23 NOTE — TELEPHONE ENCOUNTER
Pt last seen 5/19/25    Pharmacy requesting refill for:    ergocalciferol (VITAMIN D2) 50,000 units     [facility] [id number] [place] [facility] - [address] [address] (Pharmacy)

## 2025-06-27 ENCOUNTER — TELEPHONE (OUTPATIENT)
Dept: GYNECOLOGIC ONCOLOGY | Facility: CLINIC | Age: 45
End: 2025-06-27

## 2025-06-27 NOTE — TELEPHONE ENCOUNTER
Called and spoke with patient that appointment was rescheduled.  Appointment was rescheduled to 7/18 at 1:30 pm in [place] with Dr. Nuñez.  Patient confirm.

## 2025-07-02 ENCOUNTER — APPOINTMENT (OUTPATIENT)
Dept: LAB | Facility: CLINIC | Age: 45
End: 2025-07-02
Payer: COMMERCIAL

## 2025-07-02 DIAGNOSIS — G25.81 RLS (RESTLESS LEGS SYNDROME): ICD-10-CM

## 2025-07-02 DIAGNOSIS — D50.0 IRON DEFICIENCY ANEMIA DUE TO CHRONIC BLOOD LOSS: ICD-10-CM

## 2025-07-02 LAB
IRON SATN MFR SERPL: 27 % (ref 15–50)
IRON SERPL-MCNC: 102 UG/DL (ref 50–212)
TIBC SERPL-MCNC: 375.2 UG/DL (ref 250–450)
TRANSFERRIN SERPL-MCNC: 268 MG/DL (ref 203–362)
UIBC SERPL-MCNC: 273 UG/DL (ref 155–355)

## 2025-07-02 PROCEDURE — 36415 COLL VENOUS BLD VENIPUNCTURE: CPT

## 2025-07-02 PROCEDURE — 83550 IRON BINDING TEST: CPT

## 2025-07-02 PROCEDURE — 83540 ASSAY OF IRON: CPT

## 2025-07-13 ENCOUNTER — HOSPITAL ENCOUNTER (OUTPATIENT)
Dept: SLEEP CENTER | Facility: CLINIC | Age: 45
Discharge: HOME | End: 2025-07-13
Payer: COMMERCIAL

## 2025-07-13 DIAGNOSIS — G47.33 OSA (OBSTRUCTIVE SLEEP APNEA): ICD-10-CM

## 2025-07-13 PROCEDURE — 95811 POLYSOM 6/>YRS CPAP 4/> PARM: CPT | Performed by: INTERNAL MEDICINE

## 2025-07-13 PROCEDURE — 95811 POLYSOM 6/>YRS CPAP 4/> PARM: CPT

## 2025-07-14 ENCOUNTER — RA CDI HCC (OUTPATIENT)
Dept: OTHER | Facility: HOSPITAL | Age: 45
End: 2025-07-14

## 2025-07-14 NOTE — PROGRESS NOTES
Sleep Study Documentation    Pre-Sleep Study       Sleep testing procedure explained to patient:YES    Patient napped prior to study:NO    Caffeine:Dayshift worker after 12PM.  Caffeine use:NO    Alcohol:Dayshift workers after 5PM: Alcohol use:NO    Typical day for patient:YES         Study Documentation    Sleep Study Indications: Home study with RDI>5.    Montage used: Standard    Transcutaneous CO2 used: NO    Sleep Study Type:     CPAP Study     Treatment: Mode of Therapy:CPAP  CPAP changed to BiPAP:No    Optimal PAP pressure: 9 cm h20   Leak:None  Snore:Eliminated  PAP pressure at which snoring was eliminated 4 cm h20       PAP Masks  PAP mask tried Resmed Airfit N20  PAP mask choice Resmed Airfit N20  PAP mask type:nasal      PAP- Post-Study  PAP treatment:yes: Post PAP treatment patient reports feeling unsure if a change is noted and  would wear PAP mask at home.      Oxygen Data  Supplemental O2 used: No      EKG/EEG/Abnormal Behaviors     EKG abnormalities: no None    EEG abnormalities: yes:  Muscle artifact in M2 throughout study.    Were abnormal behaviors in sleep observed:NO  No    Snoring    Character:  Soft    Frequency: Occasional      Study Length    Is Total Sleep Study Recording Time < 2 hours: N/A    Is Total Sleep Study Recording Time > 2 hours but study is incomplete: N/A    Is Total Sleep Study Recording Time 6 hours or more but sleep was not obtained: NO        Post-Sleep Study    Medication used at bedtime or during sleep study:YES other prescription medications    Patient reports time it took to fall asleep:greater than 60 minutes    Patient reports waking up during study:3 or more times.  Patient reports returning to sleep in 10 to 30 minutes.    Patient reports sleeping 4 to 6 hours without dreaming.    Does the Patient feel this is a typical night of sleep:typical    Patient rated sleepiness: Somewhat sleepy or tired

## 2025-07-15 DIAGNOSIS — G47.33 OSA (OBSTRUCTIVE SLEEP APNEA): Primary | ICD-10-CM

## 2025-07-16 ENCOUNTER — TELEPHONE (OUTPATIENT)
Dept: SLEEP CENTER | Facility: CLINIC | Age: 45
End: 2025-07-16

## 2025-07-17 NOTE — PROGRESS NOTES
Name: Antonella Irving      : 1980      MRN: [id number]  Encounter Provider: Daria Nuñez MD  Encounter Date: 2025   Encounter department: [facility]  :  Assessment & Plan  Encounter for follow-up surveillance of endometrial cancer  Clinically JULIETA  Exam benign    1.5 yearsfrom diagnosis. Continue with q6 month surveillance visits or sooner with symptoms or concerns              Surgical menopause  Mild vaginal atrophy secondary to menopause. Likely contribvuting to her bladder dysfunction. Will trial moisturizers. If does not imprve, will consider referral to urogyn for further work up.          Assessment & Plan            History of Present Illness   Reason for Visit / CC: follow up    Antonella Irving is a 45 y.o. female   History of Present Illness  The patient is a 44-year-old female with stage 1A endometrial adenocarcinoma, FIGO grade 1, who is here for a follow-up visit. She is over a year and a half post-resection. She has been experiencing bladder issues for several months, which are characterized by an inability to fully empty her bladder, constant pressure, and discomfort. No dysuria. No hematuria.      Pertinent Medical History          Oncology History   Cancer Staging   Endometrial carcinoma (HCC)  Staging form: Corpus Uteri - Carcinoma, AJCC 8th Edition  - Clinical stage from 2024: FIGO Stage I (cT1, cM0) - Signed by Faustino Millan MD on 2024  Histopathologic type: Endometrioid adenocarcinoma, NOS  Stage prefix: Initial diagnosis  Histologic grade (G): G1  Histologic grading system: 3 grade system  Oncology History   Endometrial carcinoma ([facility])   2024 Initial Diagnosis    Endometrial carcinoma ([facility])     2024 -  Cancer Staged    Staging form: Corpus Uteri - Carcinoma, AJCC 8th Edition  - Clinical stage from 2024: FIGO Stage I (cT1, cM0) - Signed by Faustino Millan MD on 2024  Histopathologic type: Endometrioid  "adenocarcinoma, NOS  Stage prefix: Initial diagnosis  Histologic grade (G): G1  Histologic grading system: 3 grade system       2/27/2024 Surgery    RA-TLH/BSO/SLND    pMMR  HER2 2+ IHC, FISH neg        Review of Systems   Constitutional: Negative.    HENT: Negative.     Eyes: Negative.    Respiratory: Negative.     Cardiovascular: Negative.    Gastrointestinal: Negative.    Endocrine: Negative.    Genitourinary:  Positive for difficulty urinating and frequency.   Musculoskeletal: Negative.    Neurological: Negative.    Psychiatric/Behavioral: Negative.      A complete review of systems is negative other than that noted above in the HPI.       Objective   /80   Pulse 90   Temp (!) 97.2 °F (36.2 °C) (Temporal)   Ht 5' 5\" (1.651 m)   Wt (!) 143 kg (315 lb)   LMP 02/12/2024   SpO2 97%   BMI 52.42 kg/m²     Body mass index is 52.42 kg/m².  Pain Screening:     ECOG ECOG Performance Status: 0 - Fully active, able to carry on all pre-disease performance without restriction   Physical Exam  HENT:      Head: Normocephalic and atraumatic.     Cardiovascular:      Rate and Rhythm: Normal rate and regular rhythm.   Pulmonary:      Effort: Pulmonary effort is normal.   Abdominal:      General: There is no distension.      Palpations: Abdomen is soft. There is no mass.   Genitourinary:     Comments: Chaperone present for exam.  The external female genitalia is normal. The bartholin's, uretheral and skenes glands are normal. The urethral meatus is normal (midline with no lesions). Anus without fissure or lesion. Speculum exam reveals a grossly normal vagina cuff. No masses, lesions,discharge or bleeding.  Bimanual exam notes a surgical absent cervix, uterus and adnexal structures. No masses or fullness. Mild cystocele.  Bladder is without fullness, mass or tenderness.      Musculoskeletal:         General: Normal range of motion.      Cervical back: Normal range of motion.     Skin:     General: Skin is warm and dry. " "    Neurological:      Mental Status: She is alert and oriented to person, place, and time.       Physical Exam  Genitourinary: Slight bladder prolapse.     Results    Labs: I have reviewed pertinent labs.   No results found for: \"\"  Lab Results   Component Value Date/Time    Potassium 4.5 02/27/2025 09:39 AM    Chloride 104 02/27/2025 09:39 AM    CO2 29 02/27/2025 09:39 AM    BUN 16 02/27/2025 09:39 AM    Creatinine 0.82 02/27/2025 09:39 AM    Glucose, Fasting 86 02/27/2025 09:39 AM    Calcium 9.3 02/27/2025 09:39 AM    AST 19 02/27/2025 09:39 AM    ALT 22 02/27/2025 09:39 AM    Alkaline Phosphatase 63 02/27/2025 09:39 AM    eGFR 87 02/27/2025 09:39 AM     Lab Results   Component Value Date/Time    WBC 6.20 02/27/2025 09:39 AM    Hemoglobin 13.4 02/27/2025 09:39 AM    Hematocrit 41.9 02/27/2025 09:39 AM    MCV 88 02/27/2025 09:39 AM    Platelets 319 02/27/2025 09:39 AM     Lab Results   Component Value Date/Time    Absolute Neutrophils 3.71 02/27/2025 09:39 AM        Trend:  No results found for: \"\"            "

## 2025-07-17 NOTE — ASSESSMENT & PLAN NOTE
Clinically JULIETA  Exam benign    1.5 yearsfrom diagnosis. Continue with q6 month surveillance visits or sooner with symptoms or concerns

## 2025-07-17 NOTE — ASSESSMENT & PLAN NOTE
Mild vaginal atrophy secondary to menopause. Likely contribvuting to her bladder dysfunction. Will trial moisturizers. If does not imprve, will consider referral to urogyn for further work up.

## 2025-07-18 ENCOUNTER — OFFICE VISIT (OUTPATIENT)
Dept: GYNECOLOGIC ONCOLOGY | Facility: CLINIC | Age: 45
End: 2025-07-18
Payer: COMMERCIAL

## 2025-07-18 VITALS
SYSTOLIC BLOOD PRESSURE: 130 MMHG | HEART RATE: 90 BPM | OXYGEN SATURATION: 97 % | BODY MASS INDEX: 48.82 KG/M2 | DIASTOLIC BLOOD PRESSURE: 80 MMHG | HEIGHT: 65 IN | WEIGHT: 293 LBS | TEMPERATURE: 97.2 F

## 2025-07-18 DIAGNOSIS — E89.40 SURGICAL MENOPAUSE: ICD-10-CM

## 2025-07-18 DIAGNOSIS — Z85.42 ENCOUNTER FOR FOLLOW-UP SURVEILLANCE OF ENDOMETRIAL CANCER: Primary | ICD-10-CM

## 2025-07-18 DIAGNOSIS — Z08 ENCOUNTER FOR FOLLOW-UP SURVEILLANCE OF ENDOMETRIAL CANCER: Primary | ICD-10-CM

## 2025-07-18 LAB

## 2025-07-18 PROCEDURE — 99213 OFFICE O/P EST LOW 20 MIN: CPT | Performed by: OBSTETRICS & GYNECOLOGY

## 2025-07-18 PROCEDURE — 99459 PELVIC EXAMINATION: CPT | Performed by: OBSTETRICS & GYNECOLOGY

## 2025-07-18 NOTE — PATIENT INSTRUCTIONS
Improving Your Vaginal Health       You can do a number of things to improve your vaginal health. Talk with your doctor or nurse practitioner about the options discussed below.    Internal Vaginal Moisturizers  Vaginal moisturizers are nonhormonal, over-the-counter products that help relieve vaginal dryness and discomfort. They are available in most drug stores or on the Internet. You can use any of the moisturizers listed below 2 to 3 times a week. However, many women will need to moisturize more often (3 to 5 times per week) after cancer treatment or sudden menopause. Apply the moisturizer at bedtime for the best absorption.    Vitamin E liquid capsules - Using a pin, puncture each end of a vitamin E capsule. Insert the capsule into your vagina. Or, you can empty the capsule onto your finger and wipe the vitamin E inside your vagina.  Replens®; Hyalo GYN®,  NeoEve Cream, YES, Bee Friendly Vaginal Moisturizer - These are vaginal moisturizers that are inserted into your vagina with an applicator. You may want to put lubricant on the tip of the applicator to make insertion more comfortable.  Rojas® KEY-E Suppositories; K-Y® Brand LIQUIBEADS™; NeuEve Suppositories  - These are suppositories that are inserted into your vagina with a disposable applicator. You may want to put lubricant on the tip of the applicator to make insertion more comfortable.  Neogyn Cream, [place] Aloe Vera Gel  - Treats soreness, dryness and a burning sensation in the vulvar area; relieve pain during sexual activity; soothe irritation and redness.  The Aloe Vera Gel can be refrigerated for better relief from irritation.    External Vaginal Moisturizers  Many women experience dryness or irritation of the vulva. Using an external moisturizer can increase comfort. Natural oils such as vitamin E or coconut oil can be helpful. You can also use Replens® Long-Lasting Vaginal Moisturizer on your vulva.    Vaginal Lubricants  Vaginal lubricants  usually come in a liquid or gel form. They are used to supplement a woman’s own lubrication and minimize dryness and pain during sexual activity. Use these lubricants to make sexual intercourse more comfortable and pleasurable.  Apply the lubricant to the opening of your vagina and to whatever is being placed in or near your vagina, such as an applicator, dilator, finger, object, or a partner’s penis. Examples of lubricants include:  Pequea® Woman Water   Astroglide®   K-Y® Jelly   Pjur® Woman Bodyglide (a silicone-based lubricant)   [place] oil   Coconut oil  Avoid colored, flavored, and warming lubricants, as well as those containing spermicides. Never use petroleum jelly or Vaseline®. They do not wash away easily and can increase your risk for vaginal infection or irritation.    Resource: [facility] [place]

## 2025-07-21 ENCOUNTER — TELEPHONE (OUTPATIENT)
Dept: FAMILY MEDICINE CLINIC | Facility: CLINIC | Age: 45
End: 2025-07-21

## 2025-07-21 ENCOUNTER — OFFICE VISIT (OUTPATIENT)
Dept: FAMILY MEDICINE CLINIC | Facility: CLINIC | Age: 45
End: 2025-07-21
Payer: COMMERCIAL

## 2025-07-21 ENCOUNTER — TELEPHONE (OUTPATIENT)
Dept: SLEEP CENTER | Facility: CLINIC | Age: 45
End: 2025-07-21

## 2025-07-21 VITALS
TEMPERATURE: 97.6 F | OXYGEN SATURATION: 98 % | RESPIRATION RATE: 16 BRPM | BODY MASS INDEX: 48.82 KG/M2 | SYSTOLIC BLOOD PRESSURE: 120 MMHG | WEIGHT: 293 LBS | HEIGHT: 65 IN | HEART RATE: 78 BPM | DIASTOLIC BLOOD PRESSURE: 78 MMHG

## 2025-07-21 DIAGNOSIS — E66.813 CLASS 3 SEVERE OBESITY DUE TO EXCESS CALORIES WITH SERIOUS COMORBIDITY AND BODY MASS INDEX (BMI) OF 50.0 TO 59.9 IN ADULT: ICD-10-CM

## 2025-07-21 DIAGNOSIS — G47.33 OBSTRUCTIVE SLEEP APNEA: Primary | ICD-10-CM

## 2025-07-21 LAB

## 2025-07-21 PROCEDURE — 99213 OFFICE O/P EST LOW 20 MIN: CPT | Performed by: FAMILY MEDICINE

## 2025-07-21 RX ORDER — TIRZEPATIDE 2.5 MG/.5ML
2.5 INJECTION, SOLUTION SUBCUTANEOUS WEEKLY
Qty: 2 ML | Refills: 0 | Status: SHIPPED | OUTPATIENT
Start: 2025-07-21 | End: 2025-08-18

## 2025-07-21 NOTE — TELEPHONE ENCOUNTER
Pt was set up on 7/21/25 in [facility] with a resmed S11 set at 9-15cm and gave mask airTouch N30i Medium.

## 2025-07-21 NOTE — TELEPHONE ENCOUNTER
PA for (Zepbound) 2.5 mg/0.5 mL auto-injector SUBMITTED to     via    [x][facility]-KEY: [id number]  []Surescripts-Case ID #   []Availity-Auth ID # NDC #  []Faxed to plan   []Other website   []Phone call Case ID #     [x]PA sent as URGENT    All office notes, labs and other pertaining documents and studies sent. Clinical questions answered. Awaiting determination from insurance company.     Turnaround time for your insurance to make a decision on your Prior Authorization can take 7-21 business days.

## 2025-07-21 NOTE — ASSESSMENT & PLAN NOTE
Patient has gained 5 pound since last visit. Will discontinue phentermine and start Zepbound 2.5 mg. Discussed possible side effects. Continue with dietary modification and exercise. Will return in 1 month for medication check.     Orders:  •  tirzepatide (Zepbound) 2.5 mg/0.5 mL auto-injector; Inject 0.5 mL (2.5 mg total) under the skin once a week for 28 days

## 2025-07-21 NOTE — ASSESSMENT & PLAN NOTE
Patient with recent diagnosis of sleep apnea. Prescribed Zepbound 2.5 mg. Will return in 1 month for medication check.   Orders:  •  tirzepatide (Zepbound) 2.5 mg/0.5 mL auto-injector; Inject 0.5 mL (2.5 mg total) under the skin once a week for 28 days   Ivermectin Pregnancy And Lactation Text: This medication is Pregnancy Category C and it isn't known if it is safe during pregnancy. It is also excreted in breast milk.

## 2025-07-21 NOTE — PROGRESS NOTES
Name: Antonella Irving      : 1980      MRN: [id number]  Encounter Provider: Ruddy Taylor MD  Encounter Date: 2025   Encounter department: [facility]  :  Assessment & Plan  Class 3 severe obesity due to excess calories with serious comorbidity and body mass index (BMI) of 50.0 to 59.9 in adult  Patient has gained 5 pound since last visit. Will discontinue phentermine and start Zepbound 2.5 mg. Discussed possible side effects. Continue with dietary modification and exercise. Will return in 1 month for medication check.     Orders:  •  tirzepatide (Zepbound) 2.5 mg/0.5 mL auto-injector; Inject 0.5 mL (2.5 mg total) under the skin once a week for 28 days    Obstructive sleep apnea  Patient with recent diagnosis of sleep apnea. Prescribed Zepbound 2.5 mg. Will return in 1 month for medication check.   Orders:  •  tirzepatide (Zepbound) 2.5 mg/0.5 mL auto-injector; Inject 0.5 mL (2.5 mg total) under the skin once a week for 28 days           History of Present Illness   Antonella is a 45 year old female with multiple chronic conditions presenting for medication check. Patient was started on phentramine 37.5 mg 2 months ago and has been tolerating the medication well. No side effects noted. However, does not feel that it has made much difference in her weight. Was recently diagnosed with sleep apnea and was hoping that her insurance will now cover for Zepbound. Was taking 15 mg and tolerating medication well. Has been focusing on improving diet high in protein, fruits, and vegetables.       Review of Systems   Constitutional:  Negative for diaphoresis.   Respiratory:  Negative for chest tightness.    Cardiovascular:  Negative for chest pain and palpitations.   Gastrointestinal:  Negative for abdominal pain, constipation, diarrhea, nausea and vomiting.   Neurological:  Negative for dizziness and headaches.   All other systems reviewed and are negative.      Objective   /78  "(BP Location: Left arm, Patient Position: Sitting, Cuff Size: Large)   Pulse 78   Temp 97.6 °F (36.4 °C) (Tympanic)   Resp 16   Ht 5' 5\" (1.651 m)   Wt (!) 144 kg (317 lb 9.6 oz)   LMP 02/12/2024   SpO2 98%   BMI 52.85 kg/m²      Physical Exam  Vitals reviewed.   Constitutional:       Appearance: Normal appearance.   HENT:      Head: Normocephalic and atraumatic.      Nose: Nose normal.     Eyes:      Extraocular Movements: Extraocular movements intact.      Conjunctiva/sclera: Conjunctivae normal.       Cardiovascular:      Rate and Rhythm: Normal rate and regular rhythm.      Pulses: Normal pulses.      Heart sounds: Normal heart sounds.   Pulmonary:      Effort: Pulmonary effort is normal.      Breath sounds: Normal breath sounds.     Musculoskeletal:      Cervical back: Normal range of motion.      Right lower leg: No edema.      Left lower leg: No edema.     Skin:     General: Skin is warm and dry.     Neurological:      Mental Status: She is alert and oriented to person, place, and time.     Psychiatric:         Mood and Affect: Mood normal.         Behavior: Behavior normal.         "

## 2025-07-22 NOTE — TELEPHONE ENCOUNTER
Patient was now diagnosed with sleep apnea. Can you submit a new request with a new dx code attached to it?   Thank you

## 2025-07-29 ENCOUNTER — TELEPHONE (OUTPATIENT)
Dept: FAMILY MEDICINE CLINIC | Facility: CLINIC | Age: 45
End: 2025-07-29

## (undated) DEVICE — CANNULA SEAL

## (undated) DEVICE — SYNCHROSEAL: Brand: DA VINCI ENERGY

## (undated) DEVICE — MAYO STAND COVER: Brand: CONVERTORS

## (undated) DEVICE — PREMIUM DRY TRAY LF: Brand: MEDLINE INDUSTRIES, INC.

## (undated) DEVICE — SUT VICRYL 0 UR-6 27 IN J603H

## (undated) DEVICE — TRAY FOLEY 16FR URIMETER SILICONE SURESTEP

## (undated) DEVICE — KIT, BETHLEHEM ROBOTIC PROST: Brand: CARDINAL HEALTH

## (undated) DEVICE — 1820 FOAM BLOCK NEEDLE COUNTER: Brand: DEVON

## (undated) DEVICE — BLADELESS OBTURATOR: Brand: WECK VISTA

## (undated) DEVICE — GLOVE INDICATOR PI UNDERGLOVE SZ 7 BLUE

## (undated) DEVICE — SUT STRATAFIX SPIRAL 2-0 CT-1 30 CM SXPP1B410

## (undated) DEVICE — ADHESIVE SKIN HIGH VISCOSITY EXOFIN 1ML

## (undated) DEVICE — INTENDED FOR TISSUE SEPARATION, AND OTHER PROCEDURES THAT REQUIRE A SHARP SURGICAL BLADE TO PUNCTURE OR CUT.: Brand: BARD-PARKER SAFETY BLADES SIZE 11, STERILE

## (undated) DEVICE — UTERINE MANIPULATOR RUMI DISP TIP 6.7X12CM ORANGE

## (undated) DEVICE — CLAMP TOWEL TUBING DISPOSABLE

## (undated) DEVICE — GLOVE PI ULTRA TOUCH SZ.6.5

## (undated) DEVICE — CHLORHEXIDINE 4PCT 4 OZ

## (undated) DEVICE — INSUFFLATION NEEDLE TO ESTABLISH PNEUMOPERITONEUM.: Brand: INSUFFLATION NEEDLE

## (undated) DEVICE — 3000CC GUARDIAN II: Brand: GUARDIAN

## (undated) DEVICE — COLUMN DRAPE

## (undated) DEVICE — SYRINGE 50ML LL

## (undated) DEVICE — ARM DRAPE

## (undated) DEVICE — STERILE LATEX POWDER-FREE SURGICAL GLOVESWITH NITRILE COATING: Brand: PROTEXIS

## (undated) DEVICE — SUT MONOCRYL 4-0 PS-2 27 IN Y426H

## (undated) DEVICE — ELECTRO LUBE IS A SINGLE PATIENT USE DEVICE THAT IS INTENDED TO BE USED ON ELECTROSURGICAL ELECTRODES TO REDUCE STICKING.: Brand: KEY SURGICAL ELECTRO LUBE

## (undated) DEVICE — OCCLUDER COLPO-PNEUMO

## (undated) DEVICE — GLOVE INDICATOR PI UNDERGLOVE SZ 6.5 BLUE

## (undated) DEVICE — ASTOUND STANDARD SURGICAL GOWN, XL: Brand: CONVERTORS

## (undated) DEVICE — TIP COVER ACCESSORY

## (undated) DEVICE — 40595 XL TRENDELENBURG POSITIONING KIT: Brand: 40595 XL TRENDELENBURG POSITIONING KIT

## (undated) DEVICE — GLOVE INDICATOR PI UNDERGLOVE SZ 7.5 BLUE

## (undated) DEVICE — VISUALIZATION SYSTEM: Brand: CLEARIFY

## (undated) DEVICE — AIRSEAL TUBE SMOKE EVAC LUMENX3 FILTERED